# Patient Record
Sex: FEMALE | Race: BLACK OR AFRICAN AMERICAN | NOT HISPANIC OR LATINO | Employment: OTHER | URBAN - METROPOLITAN AREA
[De-identification: names, ages, dates, MRNs, and addresses within clinical notes are randomized per-mention and may not be internally consistent; named-entity substitution may affect disease eponyms.]

---

## 2018-09-15 ENCOUNTER — HOSPITAL ENCOUNTER (EMERGENCY)
Facility: HOSPITAL | Age: 53
Discharge: HOME | End: 2018-09-16
Attending: EMERGENCY MEDICINE
Payer: MEDICAID

## 2018-09-15 DIAGNOSIS — R60.9 EDEMA: ICD-10-CM

## 2018-09-15 DIAGNOSIS — I31.39 PERICARDIAL EFFUSION: ICD-10-CM

## 2018-09-15 DIAGNOSIS — R07.9 CHEST PAIN, UNSPECIFIED TYPE: ICD-10-CM

## 2018-09-15 DIAGNOSIS — I82.4Z2 LOWER LEG DVT (DEEP VENOUS THROMBOEMBOLISM), ACUTE, LEFT: Primary | ICD-10-CM

## 2018-09-15 DIAGNOSIS — R07.9 CHEST PAIN: ICD-10-CM

## 2018-09-15 LAB
BASOPHILS # BLD AUTO: 0.03 K/UL
BASOPHILS NFR BLD: 0.4 %
DIFFERENTIAL METHOD: ABNORMAL
EOSINOPHIL # BLD AUTO: 0.4 K/UL
EOSINOPHIL NFR BLD: 6.3 %
ERYTHROCYTE [DISTWIDTH] IN BLOOD BY AUTOMATED COUNT: 13.7 %
HCT VFR BLD AUTO: 38.5 %
HGB BLD-MCNC: 13 G/DL
LYMPHOCYTES # BLD AUTO: 1.8 K/UL
LYMPHOCYTES NFR BLD: 27.1 %
MCH RBC QN AUTO: 30 PG
MCHC RBC AUTO-ENTMCNC: 33.8 G/DL
MCV RBC AUTO: 89 FL
MONOCYTES # BLD AUTO: 0.6 K/UL
MONOCYTES NFR BLD: 9.1 %
NEUTROPHILS # BLD AUTO: 3.9 K/UL
NEUTROPHILS NFR BLD: 57.1 %
PLATELET # BLD AUTO: 377 K/UL
PMV BLD AUTO: 9.4 FL
RBC # BLD AUTO: 4.33 M/UL
WBC # BLD AUTO: 6.8 K/UL

## 2018-09-15 PROCEDURE — 83880 ASSAY OF NATRIURETIC PEPTIDE: CPT

## 2018-09-15 PROCEDURE — 80053 COMPREHEN METABOLIC PANEL: CPT

## 2018-09-15 PROCEDURE — 93005 ELECTROCARDIOGRAM TRACING: CPT

## 2018-09-15 PROCEDURE — 93010 ELECTROCARDIOGRAM REPORT: CPT | Mod: ,,, | Performed by: INTERNAL MEDICINE

## 2018-09-15 PROCEDURE — 25000003 PHARM REV CODE 250: Performed by: EMERGENCY MEDICINE

## 2018-09-15 PROCEDURE — 99285 EMERGENCY DEPT VISIT HI MDM: CPT | Mod: 25

## 2018-09-15 PROCEDURE — 84484 ASSAY OF TROPONIN QUANT: CPT

## 2018-09-15 PROCEDURE — 96374 THER/PROPH/DIAG INJ IV PUSH: CPT

## 2018-09-15 PROCEDURE — 85025 COMPLETE CBC W/AUTO DIFF WBC: CPT

## 2018-09-15 RX ORDER — LISINOPRIL 40 MG/1
40 TABLET ORAL DAILY
COMMUNITY
End: 2018-12-18 | Stop reason: SDUPTHER

## 2018-09-15 RX ORDER — HYDRALAZINE HYDROCHLORIDE 100 MG/1
100 TABLET, FILM COATED ORAL 3 TIMES DAILY
COMMUNITY
End: 2018-09-20 | Stop reason: SDUPTHER

## 2018-09-15 RX ORDER — ATORVASTATIN CALCIUM 80 MG/1
80 TABLET, FILM COATED ORAL DAILY
COMMUNITY

## 2018-09-15 RX ORDER — CEFUROXIME AXETIL 500 MG/1
500 TABLET ORAL
COMMUNITY
End: 2018-11-03

## 2018-09-15 RX ORDER — METOPROLOL SUCCINATE 25 MG/1
12.5 TABLET, EXTENDED RELEASE ORAL DAILY
COMMUNITY
End: 2018-09-20 | Stop reason: SDUPTHER

## 2018-09-15 RX ORDER — AMITRIPTYLINE HYDROCHLORIDE 50 MG/1
50 TABLET, FILM COATED ORAL NIGHTLY
COMMUNITY

## 2018-09-15 RX ORDER — GABAPENTIN 600 MG/1
600 TABLET ORAL 3 TIMES DAILY
COMMUNITY

## 2018-09-15 RX ORDER — ASPIRIN 81 MG/1
81 TABLET ORAL DAILY
COMMUNITY
End: 2020-07-08 | Stop reason: SDUPTHER

## 2018-09-15 RX ORDER — ASPIRIN 325 MG
325 TABLET ORAL
Status: COMPLETED | OUTPATIENT
Start: 2018-09-15 | End: 2018-09-15

## 2018-09-15 RX ORDER — CLOPIDOGREL BISULFATE 75 MG/1
75 TABLET ORAL DAILY
COMMUNITY
End: 2018-09-20 | Stop reason: ALTCHOICE

## 2018-09-15 RX ORDER — NIFEDIPINE 30 MG/1
30 TABLET, FILM COATED, EXTENDED RELEASE ORAL DAILY
COMMUNITY
End: 2018-11-12 | Stop reason: SDUPTHER

## 2018-09-15 RX ORDER — OXYBUTYNIN CHLORIDE 5 MG/1
5 TABLET ORAL 2 TIMES DAILY
COMMUNITY

## 2018-09-15 RX ADMIN — NITROGLYCERIN 1 INCH: 20 OINTMENT TOPICAL at 11:09

## 2018-09-15 RX ADMIN — ASPIRIN 325 MG ORAL TABLET 325 MG: 325 PILL ORAL at 11:09

## 2018-09-16 VITALS
HEART RATE: 88 BPM | TEMPERATURE: 98 F | RESPIRATION RATE: 18 BRPM | SYSTOLIC BLOOD PRESSURE: 136 MMHG | DIASTOLIC BLOOD PRESSURE: 73 MMHG | WEIGHT: 222.44 LBS | BODY MASS INDEX: 39.41 KG/M2 | OXYGEN SATURATION: 95 % | HEIGHT: 63 IN

## 2018-09-16 LAB
ALBUMIN SERPL BCP-MCNC: 2.8 G/DL
ALP SERPL-CCNC: 95 U/L
ALT SERPL W/O P-5'-P-CCNC: 91 U/L
ANION GAP SERPL CALC-SCNC: 13 MMOL/L
AST SERPL-CCNC: 121 U/L
BILIRUB SERPL-MCNC: 0.7 MG/DL
BNP SERPL-MCNC: 28 PG/ML
BUN SERPL-MCNC: 16 MG/DL
CALCIUM SERPL-MCNC: 9.1 MG/DL
CHLORIDE SERPL-SCNC: 108 MMOL/L
CO2 SERPL-SCNC: 16 MMOL/L
CREAT SERPL-MCNC: 1 MG/DL
EST. GFR  (AFRICAN AMERICAN): >60 ML/MIN/1.73 M^2
EST. GFR  (NON AFRICAN AMERICAN): >60 ML/MIN/1.73 M^2
GLUCOSE SERPL-MCNC: 112 MG/DL
POTASSIUM SERPL-SCNC: 4 MMOL/L
PROT SERPL-MCNC: 7.9 G/DL
SODIUM SERPL-SCNC: 137 MMOL/L
TROPONIN I SERPL DL<=0.01 NG/ML-MCNC: 0.01 NG/ML
TROPONIN I SERPL DL<=0.01 NG/ML-MCNC: 0.02 NG/ML

## 2018-09-16 PROCEDURE — 25500020 PHARM REV CODE 255: Performed by: EMERGENCY MEDICINE

## 2018-09-16 PROCEDURE — 84484 ASSAY OF TROPONIN QUANT: CPT

## 2018-09-16 PROCEDURE — 63600175 PHARM REV CODE 636 W HCPCS: Performed by: EMERGENCY MEDICINE

## 2018-09-16 RX ORDER — MORPHINE SULFATE 4 MG/ML
4 INJECTION, SOLUTION INTRAMUSCULAR; INTRAVENOUS
Status: COMPLETED | OUTPATIENT
Start: 2018-09-16 | End: 2018-09-16

## 2018-09-16 RX ADMIN — MORPHINE SULFATE 4 MG: 4 INJECTION INTRAVENOUS at 01:09

## 2018-09-16 RX ADMIN — IOHEXOL 100 ML: 350 INJECTION, SOLUTION INTRAVENOUS at 03:09

## 2018-09-16 NOTE — ED NOTES
Report received from Sagar BALTAZAR. Pt reports 0/10 CP. Awaiting CTA. Pt resting in bed. NAD noted. Pt at baseline. Will continue to monitor.

## 2018-09-16 NOTE — ED PROVIDER NOTES
"SCRIBE #1 NOTE: I, Neelam Rizo/Jocelyne Ortiz, am scribing for, and in the presence of, Keegan Castro Jr., MD. I have scribed the entire note.      History      Chief Complaint   Patient presents with    Chest Pain     midsternal CP "heaviness" since 1300 today     Review of patient's allergies indicates:  No Known Allergies     HPI   HPI    9/15/2018, 11:20 PM   History obtained from the patient      History of Present Illness: Tri Staton is a 53 y.o. female patient with PMHx of CVA, HTN, MI, CAD, hypercholesteremia, and neuropathy who presents to the Emergency Department for evaluation of gradually worsening LLE swelling first noted at 1:00 PM today as she was in the car traveling from [place]. No mitigating or exacerbating factors reported. Associated sxs include mild intermittent CP, mild SOB, trouble swallowing, sore throat, and cough. Pt denies fever, chills, N/V, diaphoresis, palpitations, extremity weakness/numbness, dizziness, and all other sxs at this time. Notably, pt had an MI while visiting in [place] last month and subsequently underwent cardiac stent placement-- may be in need of a CABG. Pt denies any previous blood clots. Pt notes she took ASA this morning.     Arrival mode: Personal vehicle    PCP: Primary Doctor No       Past Medical History:  Past Medical History:   Diagnosis Date    Coronary artery disease     Hypercholesteremia     Hypertension     Neuropathy     Stroke        Past Surgical History:  Past Surgical History:   Procedure Laterality Date     SECTION         Family History:  History reviewed. No pertinent family history.    Social History:  Social History     Tobacco Use    Smoking status: Former Smoker   Substance and Sexual Activity    Alcohol use: No     Frequency: Never    Drug use: No    Sexual activity: None reported.       ROS   Review of Systems   Constitutional: Negative for activity change, appetite change, chills, diaphoresis, fatigue and " fever.   HENT: Positive for sore throat and trouble swallowing. Negative for congestion, ear pain, nosebleeds, rhinorrhea and sinus pain.    Eyes: Negative for pain and discharge.   Respiratory: Positive for cough and shortness of breath. Negative for chest tightness, wheezing and stridor.    Cardiovascular: Positive for chest pain and leg swelling (L sided). Negative for palpitations.   Gastrointestinal: Negative for abdominal distention, abdominal pain, blood in stool, constipation, diarrhea, nausea and vomiting.   Genitourinary: Negative for difficulty urinating, dysuria, flank pain, frequency, hematuria and urgency.   Musculoskeletal: Negative for arthralgias, back pain, myalgias and neck pain.   Skin: Negative for pallor, rash and wound.   Neurological: Negative for dizziness, syncope, weakness, light-headedness, numbness and headaches.   Hematological: Does not bruise/bleed easily.   Psychiatric/Behavioral: Negative for confusion and self-injury.   All other systems reviewed and are negative.    Physical Exam      Initial Vitals [09/15/18 2305]   BP Pulse Resp Temp SpO2   (!) 148/79 110 20 98.5 °F (36.9 °C) 96 %      MAP       --          Physical Exam  Nursing Notes and Vital Signs Reviewed.  Constitutional: Patient is in no acute distress. Well-developed and well-nourished.  Head: Atraumatic. Normocephalic.  Eyes: PERRL. EOM intact. Conjunctivae are not pale. No scleral icterus.  ENT: Mucous membranes are moist. Oropharynx is clear and symmetric.    Neck: Supple. Full ROM. No lymphadenopathy.  Cardiovascular: Mildly swollen LLE. Regular rate. Regular rhythm. No murmurs, rubs, or gallops. Distal pulses are 2+ and symmetric.  Pulmonary/Chest: No respiratory distress. Clear to auscultation bilaterally. No wheezing or rales.  Abdominal: Soft and non-distended. There is no tenderness. No rebound, guarding, or rigidity. Good bowel sounds.  Genitourinary: No CVA tenderness  Musculoskeletal: Moves all extremities.  "No obvious deformities. Positive LLE edema.   Skin: Warm and dry.  Neurological:  Alert, awake, and appropriate.  Normal speech.  No acute focal neurological deficits are appreciated.  Psychiatric: Normal affect. Good eye contact. Appropriate in content.    ED Course    Procedures  ED Vital Signs:  Vitals:    09/15/18 2305 09/15/18 2316 09/15/18 2344 09/16/18 0132   BP: (!) 148/79   137/71   Pulse: 110  108 96   Resp: 20   (!) 22   Temp: 98.5 °F (36.9 °C)      TempSrc: Oral      SpO2: 96%   95%   Weight:  100.9 kg (222 lb 7 oz)     Height: 5' 3" (1.6 m)          Abnormal Lab Results:  Labs Reviewed   CBC W/ AUTO DIFFERENTIAL - Abnormal; Notable for the following components:       Result Value    Platelets 377 (*)     All other components within normal limits   COMPREHENSIVE METABOLIC PANEL - Abnormal; Notable for the following components:    CO2 16 (*)     Glucose 112 (*)     Albumin 2.8 (*)      (*)     ALT 91 (*)     All other components within normal limits   TROPONIN I   B-TYPE NATRIURETIC PEPTIDE   TROPONIN I        All Lab Results:  Results for orders placed or performed during the hospital encounter of 09/15/18   CBC auto differential   Result Value Ref Range    WBC 6.80 3.90 - 12.70 K/uL    RBC 4.33 4.00 - 5.40 M/uL    Hemoglobin 13.0 12.0 - 16.0 g/dL    Hematocrit 38.5 37.0 - 48.5 %    MCV 89 82 - 98 fL    MCH 30.0 27.0 - 31.0 pg    MCHC 33.8 32.0 - 36.0 g/dL    RDW 13.7 11.5 - 14.5 %    Platelets 377 (H) 150 - 350 K/uL    MPV 9.4 9.2 - 12.9 fL    Gran # (ANC) 3.9 1.8 - 7.7 K/uL    Lymph # 1.8 1.0 - 4.8 K/uL    Mono # 0.6 0.3 - 1.0 K/uL    Eos # 0.4 0.0 - 0.5 K/uL    Baso # 0.03 0.00 - 0.20 K/uL    Gran% 57.1 38.0 - 73.0 %    Lymph% 27.1 18.0 - 48.0 %    Mono% 9.1 4.0 - 15.0 %    Eosinophil% 6.3 0.0 - 8.0 %    Basophil% 0.4 0.0 - 1.9 %    Differential Method Automated    Comprehensive metabolic panel   Result Value Ref Range    Sodium 137 136 - 145 mmol/L    Potassium 4.0 3.5 - 5.1 mmol/L    Chloride " 108 95 - 110 mmol/L    CO2 16 (L) 23 - 29 mmol/L    Glucose 112 (H) 70 - 110 mg/dL    BUN, Bld 16 6 - 20 mg/dL    Creatinine 1.0 0.5 - 1.4 mg/dL    Calcium 9.1 8.7 - 10.5 mg/dL    Total Protein 7.9 6.0 - 8.4 g/dL    Albumin 2.8 (L) 3.5 - 5.2 g/dL    Total Bilirubin 0.7 0.1 - 1.0 mg/dL    Alkaline Phosphatase 95 55 - 135 U/L     (H) 10 - 40 U/L    ALT 91 (H) 10 - 44 U/L    Anion Gap 13 8 - 16 mmol/L    eGFR if African American >60 >60 mL/min/1.73 m^2    eGFR if non African American >60 >60 mL/min/1.73 m^2   Troponin I #1   Result Value Ref Range    Troponin I 0.020 0.000 - 0.026 ng/mL   B-Type natriuretic peptide (BNP)   Result Value Ref Range    BNP 28 0 - 99 pg/mL   Troponin I #2   Result Value Ref Range    Troponin I 0.012 0.000 - 0.026 ng/mL         Imaging Results:  Imaging Results          US Lower Extremity Veins Bilateral   DVT in the femoral and popliteal veins.             X-Ray Chest AP Portable   No acute findings.           CT PE Study:   Impression:   1. Mild cardiomegaly with thinning of the left ventricular apex. Pericardial effusion, measuring up to 1.6 cm thickness. Correlate clinically for pericarditis. Echocardiogram is recommended.  2. Atelectatic changes involving both lower lobes, right greater than left. Developing consolidation in the right lower lobe cannot be definitively excluded. Attention on follow-up after treatment is recommended.  3. Hepatomegaly with findings suggestive of cirrhosis.   4. Mildly prominent gallbladder is partially visualized. Right upper quadrant ultrasound may be obtained for further evaluation, if clinically indicated.  5. No definitve evidence of PE.                  The EKG was ordered, reviewed, and independently interpreted by the ED provider.  Interpretation time: 23:17  Rate: 104 BPM  Rhythm: sinus tachycardia  Interpretation: Possible LAE. RBBB. Septal infarct, age undetermined. No STEMI.         The Emergency Provider reviewed the vital signs and test  results, which are outlined above.    ED Discussion     12:48 AM: Reassessed pt at this time. Pt states that she is feeling much better. Additional order placed for morphine 4 mg injection. Discussed with pt all pertinent ED information and results. Pt verbalizes understanding of plan of care.     2:12 AM: Dr. Castro discussed the pt's case with Dr. Fink ([facility]) who recommends that we start the pt on Eliquis and repeat the troponin. If the troponin is stable, Dr. Fink recommends discharge to home with appropriate follow up. If repeat troponin is elevated, will admit.     2:14 AM: Reevaluation. Updated pt on all resulted studies, including LLE U/S showing DVT in the femoral and popliteal veins. Order placed for CT PE to r/o PE.     5:01 AM  The patient has a DVT in the left leg status post drive in from [place].  She has also had stent placement while in dialysis a month ago with questionable residual cardiovascular disease.  Patient has no primary care or cardiology here at this location currently.  Patient has a troponin that is trending downward.  She has a DVT on ultrasound the left leg as well as 1.6 cm pericardial effusion.  I have discussed the case at length with  ([facility]) on 2 different occasions.  He is aware of all findings on the patient.  He recommends outpatient treatment with Eliquis and close follow-up with Cardiology.  I discussed this with the patient the family.  They verbalized understanding of all instructions.  I have provided them cardiology is numbers well as information for [facility] to establish primary care.  They seem reliable.        ED Medication(s):  Medications   apixaban (ELIQUIS) 2.5 mg tablet (not administered)   apixaban (ELIQUIS) 2.5 mg tablet (not administered)   aspirin tablet 325 mg (325 mg Oral Given 9/15/18 2348)   nitroGLYCERIN 2% TD oint ointment 1 inch (1 inch Topical (Top) Given 9/15/18 2348)   morphine injection 4  mg (4 mg Intravenous Given 9/16/18 0114)        Current Discharge Medication List                Medical Decision Making    Medical Decision Making:   Clinical Tests:   Lab Tests: Reviewed and Ordered  Radiological Study: Reviewed and Ordered  Medical Tests: Reviewed and Ordered           Scribe Attestation:   Scribe #1: I performed the above scribed service and the documentation accurately describes the services I performed. I attest to the accuracy of the note.    Attending:   Physician Attestation Statement for Scribe #1: I, Keegan Castro Jr., MD, personally performed the services described in this documentation, as scribed by Neelam Rizo/Jocelyne Ortiz, in my presence, and it is both accurate and complete.        Clinical Impression       ICD-10-CM ICD-9-CM   1. Chest pain R07.9 786.50   2. Edema R60.9 782.3       Disposition:   Disposition: Discharged  Condition: Stable         Keegan Castro Jr., MD  09/16/18 0503

## 2018-09-16 NOTE — ED NOTES
MD Castro at bedside discussing discharge with pt and family. CP denied at this time. Pt resting in bed, NAD noted. RR e/u, airway open and patent. VSS. Will continue with discharge.

## 2018-09-16 NOTE — DISCHARGE INSTRUCTIONS
New have a deep venous thrombosis of the left leg.  Nose have a pericardial effusion.  These conditions require close follow-up.  I Have provided to used cardiologist information.  Please call him Monday to schedule an appointment.  Please walk into the [facility] on Monday as well for re-evaluation and management of your left leg DVT.  Take Eliquis as prescribed.  Continue all other home medications.  Return as needed for any worsening symptoms, problems, questions or concerns.

## 2018-09-20 ENCOUNTER — OFFICE VISIT (OUTPATIENT)
Dept: CARDIOLOGY | Facility: CLINIC | Age: 53
End: 2018-09-20
Payer: MEDICAID

## 2018-09-20 VITALS
SYSTOLIC BLOOD PRESSURE: 128 MMHG | BODY MASS INDEX: 37.39 KG/M2 | DIASTOLIC BLOOD PRESSURE: 70 MMHG | HEIGHT: 63 IN | HEART RATE: 106 BPM | WEIGHT: 211 LBS

## 2018-09-20 DIAGNOSIS — I63.9 CEREBROVASCULAR ACCIDENT (CVA), UNSPECIFIED MECHANISM: ICD-10-CM

## 2018-09-20 DIAGNOSIS — O22.30 DVT (DEEP VEIN THROMBOSIS) IN PREGNANCY: ICD-10-CM

## 2018-09-20 DIAGNOSIS — I25.118 CORONARY ARTERY DISEASE OF NATIVE ARTERY OF NATIVE HEART WITH STABLE ANGINA PECTORIS: ICD-10-CM

## 2018-09-20 DIAGNOSIS — Z87.891 EX-CIGAR SMOKER: ICD-10-CM

## 2018-09-20 DIAGNOSIS — E11.9 CONTROLLED TYPE 2 DIABETES MELLITUS WITHOUT COMPLICATION, WITHOUT LONG-TERM CURRENT USE OF INSULIN: ICD-10-CM

## 2018-09-20 DIAGNOSIS — I31.39 PERICARDIAL EFFUSION: ICD-10-CM

## 2018-09-20 DIAGNOSIS — I10 ESSENTIAL HYPERTENSION: ICD-10-CM

## 2018-09-20 DIAGNOSIS — E78.2 MIXED HYPERLIPIDEMIA: ICD-10-CM

## 2018-09-20 DIAGNOSIS — F10.11 HISTORY OF ALCOHOL ABUSE: ICD-10-CM

## 2018-09-20 DIAGNOSIS — F14.11 HISTORY OF COCAINE ABUSE: ICD-10-CM

## 2018-09-20 PROCEDURE — 99999 PR PBB SHADOW E&M-EST. PATIENT-LVL III: CPT | Mod: PBBFAC,,, | Performed by: INTERNAL MEDICINE

## 2018-09-20 PROCEDURE — 99205 OFFICE O/P NEW HI 60 MIN: CPT | Mod: S$PBB,,, | Performed by: INTERNAL MEDICINE

## 2018-09-20 PROCEDURE — 99213 OFFICE O/P EST LOW 20 MIN: CPT | Mod: PBBFAC,PO | Performed by: INTERNAL MEDICINE

## 2018-09-20 RX ORDER — ISOSORBIDE MONONITRATE 60 MG/1
60 TABLET, EXTENDED RELEASE ORAL DAILY
Qty: 30 TABLET | Refills: 3 | Status: SHIPPED | OUTPATIENT
Start: 2018-09-20 | End: 2018-10-09 | Stop reason: SDUPTHER

## 2018-09-20 RX ORDER — HYDRALAZINE HYDROCHLORIDE 100 MG/1
100 TABLET, FILM COATED ORAL 2 TIMES DAILY
Start: 2018-09-20 | End: 2018-10-09 | Stop reason: DRUGHIGH

## 2018-09-20 RX ORDER — METOPROLOL SUCCINATE 25 MG/1
25 TABLET, EXTENDED RELEASE ORAL DAILY
Qty: 30 TABLET | Refills: 5 | Status: SHIPPED | OUTPATIENT
Start: 2018-09-20 | End: 2019-03-21 | Stop reason: SDUPTHER

## 2018-09-20 NOTE — PROGRESS NOTES
Subjective:   Patient ID:  Tri Staton is a 53 y.o. female who presents for evaluation of Hospital Follow Up (stroke and heart attack); Fatigue; Shortness of Breath; and Edema      54 yo female, referred for CAD, wheelchair bound.  PMH recent Dx of CAD and CVA with residual left side weakness, HTN, MI, hypercholesteremia, and neuropathy.  Chest pain, dyspnea and could not move. Went to [facility]. Dx of CVA. D/c'ed. Few days later, felt worse and fever. Went to hospital again. Had cath and CABG was advised. Pt decided to go back to  for medical care.   Went to ER [facility] on  for LLE swelling and pain.  US revealed positive DVT and started eliquis. Chest CTA moderate pericardial effusion and no PE. D/c'ed from ER.    Today, C/o chest pain once a day. Mother found blood on the tissue at bathroom.  Feels fatigue.  Left side weakness and leg swelling.  No dyspnea, palpitation and dizziness.     Quit smoking 3 months, 1 cigar a day.x 10 yrs  Quit drinking 6 months, 1 pint of liquor a day x10 yrs.  Smoked marijuana  Smoked cocaines one yr ago for 10 yrs, remote IV cocaine 25 yrs ago.              Past Medical History:   Diagnosis Date    Coronary artery disease     Hypercholesteremia     Hypertension     Neuropathy     Stroke        Past Surgical History:   Procedure Laterality Date     SECTION         Social History     Tobacco Use    Smoking status: Former Smoker   Substance Use Topics    Alcohol use: No     Frequency: Never    Drug use: No       No family history on file.    Review of Systems   Constitution: Negative for decreased appetite, diaphoresis, fever, weakness, malaise/fatigue and night sweats.   HENT: Negative for nosebleeds.    Eyes: Negative for blurred vision and double vision.   Cardiovascular: Positive for leg swelling. Negative for chest pain, claudication, dyspnea on exertion, irregular heartbeat, near-syncope, orthopnea, palpitations, paroxysmal nocturnal  dyspnea and syncope.   Respiratory: Negative for cough, shortness of breath, sleep disturbances due to breathing, snoring, sputum production and wheezing.    Endocrine: Negative for cold intolerance and polyuria.   Hematologic/Lymphatic: Does not bruise/bleed easily.   Skin: Negative for rash.   Musculoskeletal: Positive for muscle weakness. Negative for back pain, falls, joint pain, joint swelling and neck pain.   Gastrointestinal: Negative for abdominal pain, heartburn, nausea and vomiting.   Genitourinary: Negative for dysuria, frequency and hematuria.   Neurological: Positive for loss of balance. Negative for difficulty with concentration, dizziness, focal weakness, headaches, light-headedness, numbness and seizures.   Psychiatric/Behavioral: Negative for depression, memory loss and substance abuse. The patient does not have insomnia.    Allergic/Immunologic: Negative for HIV exposure and hives.       Objective:   Physical Exam   Constitutional: She is oriented to person, place, and time. She appears well-nourished.   HENT:   Head: Normocephalic.   Eyes: Pupils are equal, round, and reactive to light.   Neck: Normal carotid pulses and no JVD present. Carotid bruit is not present. No thyromegaly present.   Cardiovascular: Normal rate, regular rhythm, normal heart sounds and normal pulses.  No extrasystoles are present. PMI is not displaced. Exam reveals no gallop and no S3.   No murmur heard.  Pulmonary/Chest: Breath sounds normal. No stridor. No respiratory distress.   Abdominal: Soft. Bowel sounds are normal. There is no tenderness. There is no rebound.   Musculoskeletal: Normal range of motion. She exhibits edema.   Trace edema   Neurological: She is alert and oriented to person, place, and time.   Skin: Skin is intact. No rash noted.   Psychiatric: Her behavior is normal.       No results found for: CHOL  No results found for: HDL  No results found for: LDLCALC  No results found for: TRIG  No results found  for: CHOLHDL    Chemistry        Component Value Date/Time     09/15/2018 2335    K 4.0 09/15/2018 2335     09/15/2018 2335    CO2 16 (L) 09/15/2018 2335    BUN 16 09/15/2018 2335    CREATININE 1.0 09/15/2018 2335     (H) 09/15/2018 2335        Component Value Date/Time    CALCIUM 9.1 09/15/2018 2335    ALKPHOS 95 09/15/2018 2335     (H) 09/15/2018 2335    ALT 91 (H) 09/15/2018 2335    BILITOT 0.7 09/15/2018 2335    ESTGFRAFRICA >60 09/15/2018 2335    EGFRNONAA >60 09/15/2018 2335          No results found for: LABA1C, HGBA1C  No results found for: TSH  No results found for: INR, PROTIME  Lab Results   Component Value Date    WBC 6.80 09/15/2018    HGB 13.0 09/15/2018    HCT 38.5 09/15/2018    MCV 89 09/15/2018     (H) 09/15/2018     BNP  @LABRCNTIP(BNP,BNPTRIAGEBLO)@  Estimated Creatinine Clearance: 69.9 mL/min (based on SCr of 1 mg/dL).  No results found in the last 24 hours.  No results found in the last 24 hours.  No results found in the last 24 hours.    Assessment:      1. Coronary artery disease of native artery of native heart with stable angina pectoris    2. Pericardial effusion    3. Cerebrovascular accident (CVA), unspecified mechanism    4. Essential hypertension    5. Controlled type 2 diabetes mellitus without complication, without long-term current use of insulin    6. Mixed hyperlipidemia    7. Ex-cigar smoker    8. History of alcohol abuse    9. History of cocaine abuse    10. DVT (deep vein thrombosis) in pregnancy      CP stable  No CHF  DVT stable  EKG sinus tachy  Plan:   Obtain old record and disc of LHC from [facility]  D/c Plavix  Continue ASA and Eliquis 5 mg bid  Add Imdur 60 mg daily  Decrease Hydralazine to 100 mg bid  Increase Metoprolol to 25 mg daily  Continue Nifedipine, Lisinopril and Lipitor  Echo ordered  Advise to check BP at home,  Will call CVT surgeon after C disc available.    Obtained Cath and MPI report on 10/03 from [facility]  yadiel at [facility].  Cath done by Dr. An: severe multipvessel Dz. Patent LIMA.  MPI showed EF 42% and anterior ischemia  Called CVT.

## 2018-09-26 ENCOUNTER — DOCUMENTATION ONLY (OUTPATIENT)
Dept: CARDIOLOGY | Facility: CLINIC | Age: 53
End: 2018-09-26

## 2018-09-26 ENCOUNTER — CLINICAL SUPPORT (OUTPATIENT)
Dept: CARDIOLOGY | Facility: CLINIC | Age: 53
End: 2018-09-26
Attending: INTERNAL MEDICINE
Payer: MEDICAID

## 2018-09-26 DIAGNOSIS — I31.39 PERICARDIAL EFFUSION: ICD-10-CM

## 2018-09-26 DIAGNOSIS — I25.118 CORONARY ARTERY DISEASE OF NATIVE ARTERY OF NATIVE HEART WITH STABLE ANGINA PECTORIS: ICD-10-CM

## 2018-09-26 LAB
DIASTOLIC DYSFUNCTION: NO
GLOBAL PERICARDIAL EFFUSION: ABNORMAL
MITRAL VALVE MOBILITY: NORMAL
RETIRED EF AND QEF - SEE NOTES: 60 (ref 55–65)

## 2018-09-26 PROCEDURE — 93325 DOPPLER ECHO COLOR FLOW MAPG: CPT | Mod: 26,S$PBB,, | Performed by: INTERNAL MEDICINE

## 2018-09-26 PROCEDURE — 93320 DOPPLER ECHO COMPLETE: CPT | Mod: 26,S$PBB,, | Performed by: INTERNAL MEDICINE

## 2018-09-26 PROCEDURE — 93303 ECHO TRANSTHORACIC: CPT | Mod: 26,S$PBB,, | Performed by: INTERNAL MEDICINE

## 2018-09-26 NOTE — PROGRESS NOTES
Pt presented for an echocardiogram today.  This study was performed in conjunction with Optison contrast agent because of poor endocardial visualization.  Procedure was explained to the patient, she verbalized understanding and signed the consent.  IV, 24ga x 2 attempts, was started in the right wrist using aseptic technique.  Administered a total of 4 ml of Optison (lot # [id number], expiration date 12/5/2019).  Patient tolerated the procedure well.  IV discontinued, pressure dressing applied.

## 2018-09-27 ENCOUNTER — HOSPITAL ENCOUNTER (EMERGENCY)
Facility: HOSPITAL | Age: 53
Discharge: HOME | End: 2018-09-27
Attending: EMERGENCY MEDICINE
Payer: MEDICAID

## 2018-09-27 VITALS
TEMPERATURE: 98 F | HEIGHT: 63 IN | RESPIRATION RATE: 20 BRPM | BODY MASS INDEX: 37.38 KG/M2 | SYSTOLIC BLOOD PRESSURE: 129 MMHG | DIASTOLIC BLOOD PRESSURE: 84 MMHG | HEART RATE: 104 BPM | OXYGEN SATURATION: 98 %

## 2018-09-27 DIAGNOSIS — R26.9 ALTERED MOBILITY DUE TO OLD STROKE: ICD-10-CM

## 2018-09-27 DIAGNOSIS — I69.398 ALTERED MOBILITY DUE TO OLD STROKE: ICD-10-CM

## 2018-09-27 DIAGNOSIS — M79.2 NEUROPATHIC PAIN: ICD-10-CM

## 2018-09-27 DIAGNOSIS — K59.00 CONSTIPATION: Primary | ICD-10-CM

## 2018-09-27 DIAGNOSIS — R53.1 WEAKNESS: ICD-10-CM

## 2018-09-27 DIAGNOSIS — Z79.01 CHRONIC ANTICOAGULATION: ICD-10-CM

## 2018-09-27 LAB
ALBUMIN SERPL BCP-MCNC: 2.9 G/DL
ALP SERPL-CCNC: 70 U/L
ALT SERPL W/O P-5'-P-CCNC: 48 U/L
ANION GAP SERPL CALC-SCNC: 14 MMOL/L
APTT BLDCRRT: 24.5 SEC
AST SERPL-CCNC: 72 U/L
BASOPHILS # BLD AUTO: 0.04 K/UL
BASOPHILS NFR BLD: 0.6 %
BILIRUB SERPL-MCNC: 0.6 MG/DL
BUN SERPL-MCNC: 14 MG/DL
CALCIUM SERPL-MCNC: 9 MG/DL
CHLORIDE SERPL-SCNC: 106 MMOL/L
CO2 SERPL-SCNC: 19 MMOL/L
CREAT SERPL-MCNC: 1.2 MG/DL
DIFFERENTIAL METHOD: NORMAL
EOSINOPHIL # BLD AUTO: 0.2 K/UL
EOSINOPHIL NFR BLD: 2.6 %
ERYTHROCYTE [DISTWIDTH] IN BLOOD BY AUTOMATED COUNT: 14.3 %
EST. GFR  (AFRICAN AMERICAN): 59 ML/MIN/1.73 M^2
EST. GFR  (NON AFRICAN AMERICAN): 51 ML/MIN/1.73 M^2
GLUCOSE SERPL-MCNC: 109 MG/DL
HCT VFR BLD AUTO: 39.2 %
HGB BLD-MCNC: 13 G/DL
INR PPP: 1.2
LYMPHOCYTES # BLD AUTO: 1.6 K/UL
LYMPHOCYTES NFR BLD: 23.1 %
MCH RBC QN AUTO: 30.4 PG
MCHC RBC AUTO-ENTMCNC: 33.2 G/DL
MCV RBC AUTO: 92 FL
MONOCYTES # BLD AUTO: 0.5 K/UL
MONOCYTES NFR BLD: 7.7 %
NEUTROPHILS # BLD AUTO: 4.7 K/UL
NEUTROPHILS NFR BLD: 66 %
PLATELET # BLD AUTO: 285 K/UL
PMV BLD AUTO: 9.7 FL
POTASSIUM SERPL-SCNC: 4.5 MMOL/L
PROT SERPL-MCNC: 7.6 G/DL
PROTHROMBIN TIME: 12.7 SEC
RBC # BLD AUTO: 4.28 M/UL
SODIUM SERPL-SCNC: 139 MMOL/L
TROPONIN I SERPL DL<=0.01 NG/ML-MCNC: 0.01 NG/ML
WBC # BLD AUTO: 7.05 K/UL

## 2018-09-27 PROCEDURE — 25000003 PHARM REV CODE 250: Performed by: EMERGENCY MEDICINE

## 2018-09-27 PROCEDURE — 84484 ASSAY OF TROPONIN QUANT: CPT

## 2018-09-27 PROCEDURE — 96361 HYDRATE IV INFUSION ADD-ON: CPT

## 2018-09-27 PROCEDURE — 93010 ELECTROCARDIOGRAM REPORT: CPT | Mod: ,,, | Performed by: INTERNAL MEDICINE

## 2018-09-27 PROCEDURE — 85730 THROMBOPLASTIN TIME PARTIAL: CPT

## 2018-09-27 PROCEDURE — 93005 ELECTROCARDIOGRAM TRACING: CPT

## 2018-09-27 PROCEDURE — 96360 HYDRATION IV INFUSION INIT: CPT

## 2018-09-27 PROCEDURE — 85025 COMPLETE CBC W/AUTO DIFF WBC: CPT

## 2018-09-27 PROCEDURE — 85610 PROTHROMBIN TIME: CPT

## 2018-09-27 PROCEDURE — 80053 COMPREHEN METABOLIC PANEL: CPT

## 2018-09-27 PROCEDURE — 99284 EMERGENCY DEPT VISIT MOD MDM: CPT | Mod: 25

## 2018-09-27 RX ORDER — ACETAMINOPHEN 325 MG/1
650 TABLET ORAL
Status: COMPLETED | OUTPATIENT
Start: 2018-09-27 | End: 2018-09-27

## 2018-09-27 RX ADMIN — SODIUM CHLORIDE 500 ML: 0.9 INJECTION, SOLUTION INTRAVENOUS at 02:09

## 2018-09-27 RX ADMIN — ACETAMINOPHEN 650 MG: 325 TABLET ORAL at 03:09

## 2018-09-27 NOTE — ED PROVIDER NOTES
SCRIBE #1 NOTE: I, Guido Landon, am scribing for, and in the presence of, Faith Blackwell MD. I have scribed the entire note.      History      Chief Complaint   Patient presents with    Rectal Bleeding     SOB, possible hemorrhoids, general weakness, neck pain, caregiver reports rectal bleeding x1 month- she thought it was the patients period       Review of patient's allergies indicates:  No Known Allergies     HPI   HPI    2018, 2:19 PM   History obtained from the mother and patient      History of Present Illness: Tri Staton is a 53 y.o. female patient with a PMHx of CAD, HTN, DVT, neuropathy, and chronic left sided weakness secondary to past stroke who presents to the Emergency Department for an evaluation of rectal bleeding which onset suddenly a few weeks ago. Pt's mother reports blood on the tissue when cleaning pt. She also reports a possible hemorrhoid. Pt reports she is on eliquis. Symptoms are intermittent and moderate in severity. Exacerbated by nothing and relieved by nothing. Associated sxs include lightheadedness and fatigue. Patient denies any fever, chills, CP, SOB, N/V, weakness/numbness, and all other sxs at this time. Pt denies tx PTA. No further complaints or concerns at this time.       Arrival mode: Personal vehicle      PCP: Primary Doctor No       Past Medical History:  Past Medical History:   Diagnosis Date    Coronary artery disease     Hypercholesteremia     Hypertension     Neuropathy     Stroke        Past Surgical History:  Past Surgical History:   Procedure Laterality Date     SECTION           Family History:  History reviewed. No pertinent family history.    Social History:  Social History     Tobacco Use    Smoking status: Former Smoker   Substance and Sexual Activity    Alcohol use: No     Frequency: Never    Drug use: No    Sexual activity: Unknown       ROS   Review of Systems   Constitutional: Positive for fatigue. Negative for appetite  change, chills and fever.   HENT: Negative for congestion and sore throat.    Eyes: Negative for photophobia and visual disturbance.   Respiratory: Negative for cough and shortness of breath.    Cardiovascular: Negative for chest pain.   Gastrointestinal: Positive for anal bleeding and blood in stool. Negative for abdominal distention, constipation, diarrhea, nausea and vomiting.        (-) Hematemesis   Genitourinary: Negative for dysuria, frequency, hematuria, urgency, vaginal bleeding and vaginal discharge.   Musculoskeletal: Negative for back pain.   Skin: Negative for rash.   Neurological: Positive for light-headedness. Negative for dizziness, syncope, weakness, numbness and headaches.   Hematological: Does not bruise/bleed easily.     Physical Exam      Initial Vitals [09/27/18 1357]   BP Pulse Resp Temp SpO2   (!) 101/58 102 20 97.7 °F (36.5 °C) 95 %      MAP       --          Physical Exam  Nursing Notes and Vital Signs Reviewed.  Constitutional: Patient is in no acute distress. Obese.  Head: Atraumatic. Normocephalic.  Eyes: PERRL. EOM intact. Conjunctivae are not pale. No scleral icterus.  ENT: Mucous membranes are moist. Oropharynx is clear and symmetric.    Neck: Supple. Full ROM. No lymphadenopathy.  Cardiovascular: Regular rate. Regular rhythm.  Pulmonary/Chest: No respiratory distress. Clear to auscultation bilaterally. No wheezing or rales.  Abdominal: Soft and non-distended.  There is no tenderness.  Rectal:  No tenderness.  No masses.  No hemorrhoids.  Normal sphincter tone. Small anal tear noted with no active bleeding.  Musculoskeletal: Moves all extremities. No obvious deformities. No edema. No calf tenderness.  Skin: Warm and dry.  Neurological:  Alert, awake, and appropriate.  Chronic left sided weakness.  Psychiatric: Normal affect. Good eye contact. Appropriate in content.    ED Course    Procedures  ED Vital Signs:  Vitals:    09/27/18 1357 09/27/18 1415 09/27/18 1418 09/27/18 1500   BP:  "(!) 101/58 123/72  123/78   Pulse: 102 102 103 102   Resp: 20 20  20   Temp: 97.7 °F (36.5 °C)      TempSrc: Oral      SpO2: 95% 99%  98%   Height: 5' 3" (1.6 m)       09/27/18 1557 09/27/18 1600   BP: 139/80 129/84   Pulse: 104 104   Resp: 20 20   Temp:     TempSrc:     SpO2: 97% 98%   Height:         Abnormal Lab Results:  Labs Reviewed   PROTIME-INR - Abnormal; Notable for the following components:       Result Value    Prothrombin Time 12.7 (*)     All other components within normal limits   COMPREHENSIVE METABOLIC PANEL - Abnormal; Notable for the following components:    CO2 19 (*)     Albumin 2.9 (*)     AST 72 (*)     ALT 48 (*)     eGFR if  59 (*)     eGFR if non  51 (*)     All other components within normal limits   CBC W/ AUTO DIFFERENTIAL   APTT   TROPONIN I        All Lab Results:  Results for orders placed or performed during the hospital encounter of 09/27/18   CBC auto differential   Result Value Ref Range    WBC 7.05 3.90 - 12.70 K/uL    RBC 4.28 4.00 - 5.40 M/uL    Hemoglobin 13.0 12.0 - 16.0 g/dL    Hematocrit 39.2 37.0 - 48.5 %    MCV 92 82 - 98 fL    MCH 30.4 27.0 - 31.0 pg    MCHC 33.2 32.0 - 36.0 g/dL    RDW 14.3 11.5 - 14.5 %    Platelets 285 150 - 350 K/uL    MPV 9.7 9.2 - 12.9 fL    Gran # (ANC) 4.7 1.8 - 7.7 K/uL    Lymph # 1.6 1.0 - 4.8 K/uL    Mono # 0.5 0.3 - 1.0 K/uL    Eos # 0.2 0.0 - 0.5 K/uL    Baso # 0.04 0.00 - 0.20 K/uL    Gran% 66.0 38.0 - 73.0 %    Lymph% 23.1 18.0 - 48.0 %    Mono% 7.7 4.0 - 15.0 %    Eosinophil% 2.6 0.0 - 8.0 %    Basophil% 0.6 0.0 - 1.9 %    Differential Method Automated    Protime-INR   Result Value Ref Range    Prothrombin Time 12.7 (H) 9.0 - 12.5 sec    INR 1.2 0.8 - 1.2   APTT   Result Value Ref Range    aPTT 24.5 21.0 - 32.0 sec   Comprehensive metabolic panel   Result Value Ref Range    Sodium 139 136 - 145 mmol/L    Potassium 4.5 3.5 - 5.1 mmol/L    Chloride 106 95 - 110 mmol/L    CO2 19 (L) 23 - 29 mmol/L    Glucose " 109 70 - 110 mg/dL    BUN, Bld 14 6 - 20 mg/dL    Creatinine 1.2 0.5 - 1.4 mg/dL    Calcium 9.0 8.7 - 10.5 mg/dL    Total Protein 7.6 6.0 - 8.4 g/dL    Albumin 2.9 (L) 3.5 - 5.2 g/dL    Total Bilirubin 0.6 0.1 - 1.0 mg/dL    Alkaline Phosphatase 70 55 - 135 U/L    AST 72 (H) 10 - 40 U/L    ALT 48 (H) 10 - 44 U/L    Anion Gap 14 8 - 16 mmol/L    eGFR if African American 59 (A) >60 mL/min/1.73 m^2    eGFR if non African American 51 (A) >60 mL/min/1.73 m^2   Troponin I   Result Value Ref Range    Troponin I 0.009 0.000 - 0.026 ng/mL         Imaging Results:  Imaging Results          X-Ray Abdomen Flat And Erect (Final result)  Result time 09/27/18 15:02:05    Final result by Sagar Benjamin III, MD (09/27/18 15:02:05)                 Impression:      Nonspecific gas pattern.  Large volume of stool in the colon.  No free air.      Electronically signed by: Sagar Benjamin MD  Date:    09/27/2018  Time:    15:02             Narrative:    EXAMINATION:  XR ABDOMEN FLAT AND ERECT    CLINICAL HISTORY:  Constipation, unspecified    COMPARISON:  None    FINDINGS:  Nonspecific gas pattern without mass or obstruction.  Moderately large volume of stool noted in the colon.  No free air.                               X-Ray Chest AP Portable (Final result)  Result time 09/27/18 15:01:16    Final result by Sagar Benjamin III, MD (09/27/18 15:01:16)                 Impression:      Negative one view chest x-ray.      Electronically signed by: Sagar Benjamin MD  Date:    09/27/2018  Time:    15:01             Narrative:    EXAMINATION:  XR CHEST AP PORTABLE    CLINICAL HISTORY:  Chest Pain;    COMPARISON:  September 16th    FINDINGS:  Heart size is borderline with mild tortuosity of the thoracic aorta.  Lung fields are clear.  Clearing of the atelectatic changes noted previously.                                      The EKG was ordered, reviewed, and independently interpreted by the ED provider.  Interpretation time:  14:23  Rate: 113 BPM  Rhythm: sinus tachycardia with occasional premature ventricular complexes.  Interpretation: Possible left atrial enlargement Right bundle branch block. Anteroseptal infarct. No STEMI.        The Emergency Provider reviewed the vital signs and test results, which are outlined above.    ED Discussion     3:50 PM: Reassessed pt at this time, h/h stable no active rectal bleeding, discussed tx for constipation.  Pt states her condition has improved at this time. Discussed with pt all pertinent ED information and results. Discussed pt dx and plan of tx with Miralax and milk of magnesia. Gave pt all f/u and return to the ED instructions. All questions and concerns were addressed at this time. Pt expresses understanding of information and instructions, and is comfortable with plan to discharge. Pt is stable for discharge.    I discussed with patient and/or family/caretaker that evaluation in the ED does not suggest any emergent or life threatening medical conditions requiring immediate intervention beyond what was provided in the ED, and I believe patient is safe for discharge.  Regardless, an unremarkable evaluation in the ED does not preclude the development or presence of a serious of life threatening condition. As such, patient was instructed to return immediately for any worsening or change in current symptoms.    ED Medication(s):  Medications   sodium chloride 0.9% bolus 500 mL (0 mLs Intravenous Stopped 9/27/18 1557)   acetaminophen tablet 650 mg (650 mg Oral Given 9/27/18 1553)       Follow-up Information     Local Primary Care Doctor. Schedule an appointment as soon as possible for a visit in 1 day.    Why:  Return to the Emergency Room, If symptoms worsen                   Medical Decision Making    Medical Decision Making:   Clinical Tests:   Lab Tests: Reviewed and Ordered  Radiological Study: Reviewed and Ordered  Medical Tests: Ordered and Reviewed           Scribe Attestation:   Scribe  #1: I performed the above scribed service and the documentation accurately describes the services I performed. I attest to the accuracy of the note.    Attending:   Physician Attestation Statement for Scribe #1: I, Faith Blackwell MD, personally performed the services described in this documentation, as scribed by Guido Navarrete, in my presence, and it is both accurate and complete.          Clinical Impression       ICD-10-CM ICD-9-CM   1. Constipation K59.00 564.00   2. Weakness R53.1 780.79   3. Altered mobility due to old stroke I69.398 438.89    R26.9 781.99   4. Chronic anticoagulation Z79.01 V58.61   5. Neuropathic pain M79.2 729.2       Disposition:   Disposition: Discharged  Condition: Stable         Faith Blackwell MD  09/27/18 3004

## 2018-10-02 ENCOUNTER — TELEPHONE (OUTPATIENT)
Dept: CARDIOLOGY | Facility: CLINIC | Age: 53
End: 2018-10-02

## 2018-10-02 NOTE — TELEPHONE ENCOUNTER
"Spoke with pt with who stated she wanted to know what is going to be done to "resolve the situation."  Pt states that her echo showed pericardial effusion and it is not resolved.  Will ask dr. Weldon to advise.     ----- Message from Monroe Saldana sent at 10/2/2018  1:54 PM CDT -----  Contact: pt mother   Caller is requesting a call back from the nurse in regards to pt getting her test results please.  [phone] (home)  Or [phone] cell       "

## 2018-10-02 NOTE — TELEPHONE ENCOUNTER
Spoke with pt.  Sent fax request to [facility]  for disc, discharge summary, relevant labs and cardiac tests for the month of September 2018

## 2018-10-04 ENCOUNTER — HOSPITAL ENCOUNTER (EMERGENCY)
Facility: HOSPITAL | Age: 53
Discharge: HOME | End: 2018-10-04
Attending: EMERGENCY MEDICINE
Payer: MEDICAID

## 2018-10-04 ENCOUNTER — TELEPHONE (OUTPATIENT)
Dept: CARDIOLOGY | Facility: CLINIC | Age: 53
End: 2018-10-04

## 2018-10-04 VITALS
OXYGEN SATURATION: 97 % | RESPIRATION RATE: 19 BRPM | DIASTOLIC BLOOD PRESSURE: 70 MMHG | SYSTOLIC BLOOD PRESSURE: 127 MMHG | WEIGHT: 228.31 LBS | HEART RATE: 96 BPM | BODY MASS INDEX: 40.45 KG/M2 | HEIGHT: 63 IN | TEMPERATURE: 98 F

## 2018-10-04 DIAGNOSIS — R07.9 CHEST PAIN: ICD-10-CM

## 2018-10-04 DIAGNOSIS — K64.4 EXTERNAL HEMORRHOID: Primary | ICD-10-CM

## 2018-10-04 LAB
ALBUMIN SERPL BCP-MCNC: 3.2 G/DL
ALP SERPL-CCNC: 73 U/L
ALT SERPL W/O P-5'-P-CCNC: 84 U/L
ANION GAP SERPL CALC-SCNC: 8 MMOL/L
APTT BLDCRRT: 23.7 SEC
AST SERPL-CCNC: 131 U/L
BASOPHILS # BLD AUTO: 0.02 K/UL
BASOPHILS NFR BLD: 0.4 %
BILIRUB SERPL-MCNC: 0.5 MG/DL
BNP SERPL-MCNC: 15 PG/ML
BUN SERPL-MCNC: 16 MG/DL
CALCIUM SERPL-MCNC: 9.5 MG/DL
CHLORIDE SERPL-SCNC: 109 MMOL/L
CO2 SERPL-SCNC: 22 MMOL/L
CREAT SERPL-MCNC: 1 MG/DL
DIFFERENTIAL METHOD: NORMAL
EOSINOPHIL # BLD AUTO: 0.3 K/UL
EOSINOPHIL NFR BLD: 6.5 %
ERYTHROCYTE [DISTWIDTH] IN BLOOD BY AUTOMATED COUNT: 14.3 %
EST. GFR  (AFRICAN AMERICAN): >60 ML/MIN/1.73 M^2
EST. GFR  (NON AFRICAN AMERICAN): >60 ML/MIN/1.73 M^2
GLUCOSE SERPL-MCNC: 94 MG/DL
HCT VFR BLD AUTO: 37.8 %
HGB BLD-MCNC: 12.5 G/DL
INR PPP: 1.2
LYMPHOCYTES # BLD AUTO: 1.9 K/UL
LYMPHOCYTES NFR BLD: 37 %
MCH RBC QN AUTO: 30.3 PG
MCHC RBC AUTO-ENTMCNC: 33.1 G/DL
MCV RBC AUTO: 92 FL
MONOCYTES # BLD AUTO: 0.5 K/UL
MONOCYTES NFR BLD: 9.5 %
NEUTROPHILS # BLD AUTO: 2.4 K/UL
NEUTROPHILS NFR BLD: 46.6 %
PLATELET # BLD AUTO: 276 K/UL
PMV BLD AUTO: 9.8 FL
POTASSIUM SERPL-SCNC: 4.2 MMOL/L
PROT SERPL-MCNC: 8 G/DL
PROTHROMBIN TIME: 12.2 SEC
RBC # BLD AUTO: 4.13 M/UL
SODIUM SERPL-SCNC: 139 MMOL/L
TROPONIN I SERPL DL<=0.01 NG/ML-MCNC: <0.006 NG/ML
WBC # BLD AUTO: 5.24 K/UL

## 2018-10-04 PROCEDURE — 93010 ELECTROCARDIOGRAM REPORT: CPT | Mod: ,,, | Performed by: INTERNAL MEDICINE

## 2018-10-04 PROCEDURE — 83880 ASSAY OF NATRIURETIC PEPTIDE: CPT

## 2018-10-04 PROCEDURE — 85025 COMPLETE CBC W/AUTO DIFF WBC: CPT

## 2018-10-04 PROCEDURE — 80053 COMPREHEN METABOLIC PANEL: CPT

## 2018-10-04 PROCEDURE — 25000003 PHARM REV CODE 250: Performed by: EMERGENCY MEDICINE

## 2018-10-04 PROCEDURE — 85730 THROMBOPLASTIN TIME PARTIAL: CPT

## 2018-10-04 PROCEDURE — 84484 ASSAY OF TROPONIN QUANT: CPT

## 2018-10-04 PROCEDURE — 85610 PROTHROMBIN TIME: CPT

## 2018-10-04 PROCEDURE — 99285 EMERGENCY DEPT VISIT HI MDM: CPT | Mod: 25

## 2018-10-04 PROCEDURE — 36415 COLL VENOUS BLD VENIPUNCTURE: CPT

## 2018-10-04 RX ORDER — DOCUSATE SODIUM 100 MG/1
100 CAPSULE, LIQUID FILLED ORAL ONCE
Status: COMPLETED | OUTPATIENT
Start: 2018-10-04 | End: 2018-10-04

## 2018-10-04 RX ORDER — HYDROCORTISONE 25 MG/G
CREAM TOPICAL 2 TIMES DAILY
Qty: 1 TUBE | Refills: 0 | Status: SHIPPED | OUTPATIENT
Start: 2018-10-04 | End: 2019-06-25 | Stop reason: SDUPTHER

## 2018-10-04 RX ORDER — HYDROCORTISONE 25 MG/G
CREAM TOPICAL 2 TIMES DAILY
Qty: 1 TUBE | Refills: 0 | Status: SHIPPED | OUTPATIENT
Start: 2018-10-04 | End: 2018-10-04 | Stop reason: SDUPTHER

## 2018-10-04 RX ORDER — DOCUSATE SODIUM 100 MG/1
100 CAPSULE, LIQUID FILLED ORAL 2 TIMES DAILY PRN
Qty: 40 CAPSULE | Refills: 0 | Status: SHIPPED | OUTPATIENT
Start: 2018-10-04

## 2018-10-04 RX ADMIN — DOCUSATE SODIUM 100 MG: 100 CAPSULE, LIQUID FILLED ORAL at 09:10

## 2018-10-04 NOTE — TELEPHONE ENCOUNTER
Spoke with pt mother who is bringing pt to the ER now.  Called ER and spoke with Wayne and faxed over paperwork from [facility].     ----- Message from Gail Spears sent at 10/4/2018 12:46 PM CDT -----  Contact: Namita pt Mom  Matt states her daughter is very ill and she needs nurse to contact her regarding a Disc from [place] to look at her heart, matt states she's really unsure of what doctor needs it for, but no one contacted her and she really needs to get in touch with someone because she doesn't know anything else to do.

## 2018-10-04 NOTE — ED NOTES
Patient complains of CP and back pain / left arm pain. Symptoms have been present since this morning. Patient describes symptoms as intermittent heaviness in chest. Patient denies n/v.    Level of Consciousness: The patient is awake, alert, and oriented with appropriate affect and speech; oriented to person, place and time.  Appearance: Sitting up in bed with no acute distress noted. Clothing and hygiene are clean and worn appropriately.  Skin: Skin is warm and dry with good skin turgor; intact; color consistent with ethnicity.  Mucous membranes are moist.   Musculoskeletal: Moves all extremities well in full range of motion. No obvious deformities or swelling noted.  Respiratory: Airway open and patent, respirations spontaneous, even and unlabored. No accessory muscles in use. Breath sounds clear.  Cardiac: Regular rate and rhythm, no peripheral edema noted, good pulses palpated peripherally, capillary refill < 3 seconds.  Abdomen: Soft, non-tender to palpation. No distention noted.  Neurologic: PERRLA, face exhibits symmetrical expression, hand grasps equal and even bilaterally, reports normal sensation to all extremities and face.  Psychosocial:Calm and cooperative.     Patient verbalized understanding of status and plan of care. Patient changed into hospital gown with assistance. Side rails up x 2, call light in reach, bed low and locked. Cardiac monitor applied to patient; alarms on, audible, and set. Will continue to monitor.

## 2018-10-04 NOTE — ED NOTES
"Pt reports midsternal chest "heaviness", intermittent x 3 weeks. Unknown causing factors. Pt remains on cardiac monitor. Updated on POC. NAD noted. RR e/u, airway open and patent. Will resume care.  "

## 2018-10-05 ENCOUNTER — TELEPHONE (OUTPATIENT)
Dept: CARDIOLOGY | Facility: CLINIC | Age: 53
End: 2018-10-05

## 2018-10-05 NOTE — TELEPHONE ENCOUNTER
Faxed over paper work from [place] today.    ----- Message from Fransico Steele MD sent at 10/5/2018  9:52 AM CDT -----  The fax number in my office is .     Thanks  ----- Message -----  From: Kate Jane MA  Sent: 10/4/2018   9:47 AM  To: Fransico Steele MD, Ivette Bateman Staff    Dr Christopher Weldon has asked that I let you know I have received medical records from [facility] for this pt.  She is in bad shape.  I am waiting on her heart cath CD but don't know when it will arrive.  I did request it urgent.  What number can I fax her records to?  She will need an appointment ASAP.  Thank you.   Kate

## 2018-10-05 NOTE — ED NOTES
POC discussed. Pt lifted in bed and turned to provide comfort and prevent breakdown. NAD. VSS. RR e/u, airway open and patent. Will continue to monitor.

## 2018-10-05 NOTE — ED PROVIDER NOTES
SCRIBE #1 NOTE: I, Libertad Holly, am scribing for, and in the presence of, Sagar Lopez MD. I have scribed the entire note.      History      Chief Complaint   Patient presents with    Chest Pain     pt sent by dr. brunner       Review of patient's allergies indicates:  No Known Allergies     HPI   HPI    10/4/2018, 5:40 PM   History obtained from the patient      History of Present Illness: Tri Staton is a 53 y.o. female patient w/ PMHx of CAD, HTN, and CVA who presents to the Emergency Department for hemorrhoids onset 1 wk ago. Symptoms include pain and bleeding. Symptoms are intermittent and moderate in severity. Associated sxs include back pain and CP. She states that she was at home not feeling well with her hemorrhoids when she experienced some chest pain around 1:30 PM. Patient denies chest pain in the ED. Patient denies any fever, chills, SOB, diaphoresis, palpitations, extremity weakness/numbness, leg pain/swelling, dizziness, cough, n/v, and all other sxs at this time. Pt reports multiple visits to ED within past month. Pt reports she had a stroke and a cardiac stent placed in 2018 in [place]. She states that she was advised CABG in [place], but elected not to go that route and follow-up with Cardiology in [place]. Pt is following up with cardiology. No prior Tx today. No further complaints or concerns at this time.       Arrival mode: Personal vehicle    PCP: Primary Doctor No       Past Medical History:  Past Medical History:   Diagnosis Date    Coronary artery disease     Coronary artery disease of native artery of native heart with stable angina pectoris 2018    Hypercholesteremia     Hypertension     Neuropathy     Stroke        Past Surgical History:  Past Surgical History:   Procedure Laterality Date     SECTION           Family History:  History reviewed. No pertinent family history.    Social History:  Social History     Tobacco Use    Smoking status: Former  Smoker   Substance and Sexual Activity    Alcohol use: No     Frequency: Never    Drug use: No    Sexual activity: Unknown       ROS   Review of Systems   Constitutional: Negative for chills, diaphoresis and fever.   HENT: Negative for sore throat.    Respiratory: Negative for cough and shortness of breath.    Cardiovascular: Positive for chest pain. Negative for palpitations and leg swelling.   Gastrointestinal: Negative for nausea and vomiting.   Genitourinary: Negative for dysuria.        Hemorroid   Musculoskeletal: Positive for back pain.   Skin: Negative for rash.   Neurological: Negative for dizziness, weakness and numbness.   Hematological: Does not bruise/bleed easily.   All other systems reviewed and are negative.      Physical Exam      Initial Vitals   BP Pulse Resp Temp SpO2   10/04/18 1710 10/04/18 1710 10/04/18 1710 10/04/18 1710 10/04/18 1853   135/78 108 20 98.6 °F (37 °C) 97 %      MAP       --                 Physical Exam  Nursing Notes and Vital Signs Reviewed.  Constitutional: Patient is in no acute distress. Well-developed and well-nourished.  Head: Atraumatic. Normocephalic.  Eyes: PERRL. EOM intact. Conjunctivae are not pale. No scleral icterus.  ENT: Mucous membranes are moist. Oropharynx is clear and symmetric.    Neck: Supple. Full ROM. No lymphadenopathy.  Cardiovascular: Regular rate. Regular rhythm. No murmurs, rubs, or gallops. Distal pulses are 2+ and symmetric.  Pulmonary/Chest: No respiratory distress. Clear to auscultation bilaterally. No wheezing or rales.  Abdominal: Soft and non-distended.  There is no tenderness.  No rebound, guarding, or rigidity. Good bowel sounds.  Genitourinary: No CVA tenderness  Rectal: Female chaperone is present. External hemorrhoid. No bleeding. No discoloration.  Musculoskeletal: Moves all extremities. No obvious deformities. No edema. No calf tenderness.  Skin: Warm and dry.  Neurological:  Alert, awake, and appropriate.  Normal speech.  No acute  "focal neurological deficits are appreciated.  Psychiatric: Normal affect. Good eye contact. Appropriate in content.    ED Course    Procedures  ED Vital Signs:  Vitals:    10/04/18 1710 10/04/18 1712 10/04/18 1720 10/04/18 1731   BP: 135/78 135/78  113/68   Pulse: 108  102 99   Resp: 20   20   Temp: 98.6 °F (37 °C)      TempSrc: Oral      SpO2:       Weight:       Height: 5' 3" (1.6 m)       10/04/18 1733 10/04/18 1851 10/04/18 1852 10/04/18 1853   BP: 113/68  122/71    Pulse: 98   99   Resp: (!) 23   20   Temp:       TempSrc:       SpO2:    97%   Weight:  103.6 kg (228 lb 4.8 oz)     Height:        10/04/18 2046 10/04/18 2146   BP: 130/73 127/70   Pulse: 99 96   Resp: (!) 23 19   Temp:  98.3 °F (36.8 °C)   TempSrc:  Oral   SpO2: 95% 97%   Weight:     Height:         Abnormal Lab Results:  Labs Reviewed   COMPREHENSIVE METABOLIC PANEL - Abnormal; Notable for the following components:       Result Value    CO2 22 (*)     Albumin 3.2 (*)      (*)     ALT 84 (*)     All other components within normal limits   CBC W/ AUTO DIFFERENTIAL   B-TYPE NATRIURETIC PEPTIDE   TROPONIN I   APTT   PROTIME-INR        All Lab Results:  Results for orders placed or performed during the hospital encounter of 10/04/18   CBC auto differential   Result Value Ref Range    WBC 5.24 3.90 - 12.70 K/uL    RBC 4.13 4.00 - 5.40 M/uL    Hemoglobin 12.5 12.0 - 16.0 g/dL    Hematocrit 37.8 37.0 - 48.5 %    MCV 92 82 - 98 fL    MCH 30.3 27.0 - 31.0 pg    MCHC 33.1 32.0 - 36.0 g/dL    RDW 14.3 11.5 - 14.5 %    Platelets 276 150 - 350 K/uL    MPV 9.8 9.2 - 12.9 fL    Gran # (ANC) 2.4 1.8 - 7.7 K/uL    Lymph # 1.9 1.0 - 4.8 K/uL    Mono # 0.5 0.3 - 1.0 K/uL    Eos # 0.3 0.0 - 0.5 K/uL    Baso # 0.02 0.00 - 0.20 K/uL    Gran% 46.6 38.0 - 73.0 %    Lymph% 37.0 18.0 - 48.0 %    Mono% 9.5 4.0 - 15.0 %    Eosinophil% 6.5 0.0 - 8.0 %    Basophil% 0.4 0.0 - 1.9 %    Differential Method Automated    Comprehensive metabolic panel   Result Value Ref Range "    Sodium 139 136 - 145 mmol/L    Potassium 4.2 3.5 - 5.1 mmol/L    Chloride 109 95 - 110 mmol/L    CO2 22 (L) 23 - 29 mmol/L    Glucose 94 70 - 110 mg/dL    BUN, Bld 16 6 - 20 mg/dL    Creatinine 1.0 0.5 - 1.4 mg/dL    Calcium 9.5 8.7 - 10.5 mg/dL    Total Protein 8.0 6.0 - 8.4 g/dL    Albumin 3.2 (L) 3.5 - 5.2 g/dL    Total Bilirubin 0.5 0.1 - 1.0 mg/dL    Alkaline Phosphatase 73 55 - 135 U/L     (H) 10 - 40 U/L    ALT 84 (H) 10 - 44 U/L    Anion Gap 8 8 - 16 mmol/L    eGFR if African American >60 >60 mL/min/1.73 m^2    eGFR if non African American >60 >60 mL/min/1.73 m^2   Brain natriuretic peptide   Result Value Ref Range    BNP 15 0 - 99 pg/mL   Troponin I   Result Value Ref Range    Troponin I <0.006 0.000 - 0.026 ng/mL   APTT   Result Value Ref Range    aPTT 23.7 21.0 - 32.0 sec   Protime-INR   Result Value Ref Range    Prothrombin Time 12.2 9.0 - 12.5 sec    INR 1.2 0.8 - 1.2         Imaging Results:  Imaging Results          X-Ray Chest PA And Lateral (Final result)  Result time 10/04/18 18:18:10    Final result by Jaison Linares MD (10/04/18 18:18:10)                 Impression:      1. No acute chest findings.  2. No change since 09/27/2018.      Electronically signed by: Jaison Linares MD  Date:    10/04/2018  Time:    18:18             Narrative:    EXAMINATION:  XR CHEST PA AND LATERAL    CLINICAL HISTORY:  Chest pain, unspecified    COMPARISON:  09/27/2018    FINDINGS:  Lungs are clear.  Heart size within normal limits.No significant bony findings.                                  The EKG was ordered, reviewed, and independently interpreted by the ED provider.  Interpretation time: 1723  Rate: 102 BPM  Rhythm: sinus tachycardia  Interpretation: RBBB pattern. NO S1Q3T3. No ST depression/elevation. No STEMI.    The Emergency Provider reviewed the vital signs and test results, which are outlined above.    ED Discussion     9:26 PM: Reassessed pt at this time. Pt reports no sxs at this  moment. Pt states her condition has improved at this time. Discussed with pt all pertinent ED information and results. Discussed pt dx and plan of tx. Gave pt all f/u and return to the ED instructions. All questions and concerns were addressed at this time. Pt expresses understanding of information and instructions, and is comfortable with plan to discharge. Pt is stable for discharge. Discussed w/ pt to f/u w/ cardiology ASAP.     I discussed with patient and/or family/caretaker that evaluation in the ED does not suggest any emergent or life threatening medical conditions requiring immediate intervention beyond what was provided in the ED, and I believe patient is safe for discharge.  Regardless, an unremarkable evaluation in the ED does not preclude the development or presence of a serious of life threatening condition. As such, patient was instructed to return immediately for any worsening or change in current symptoms.    I have discussed with patient and/or family/caretaker chest pain precautions, specifically to return for worsening chest pain, shortness of breath, fever, or any concern.  I have low suspicion for cardiopulmonary, vascular, infectious, respiratory, or other emergent medical condition based on my evaluation in the ED.    ED Medication(s):  Medications   docusate sodium capsule 100 mg (100 mg Oral Given 10/4/18 5123)       Follow-up Information     Your Cardiologist as soon as possible.           [facility]    Specialty:  Emergency Medicine  Why:  As needed, If symptoms worsen  Contact information:  [address] [facility] [address] [phone]           Primary Care Physician for hemorrhoids.                   Medical Decision Making    Medical Decision Making:   Clinical Tests:   Lab Tests: Ordered and Reviewed  Radiological Study: Ordered and Reviewed  Medical Tests: Ordered and Reviewed  External hemorroid; will treat with stool softeners and  topical steroid cream; patient also mentioned chest pain that had resolved PTA; no ST depressions/elevation on EKG; troponin within normal limits ruling out MI; based off patient's history of a cardiologist's recommending CABG, do suspect the patient has coronary artery disease that mandates cardiology follow-up; patient states that she has been following up with Cardiology and can follow up in a timely fashion; ER return precautions provided           Scribe Attestation:   Scribe #1: I performed the above scribed service and the documentation accurately describes the services I performed. I attest to the accuracy of the note.    Attending:   Physician Attestation Statement for Scribe #1: I, Sagar Lopez MD, personally performed the services described in this documentation, as scribed by Libertad Holly, in my presence, and it is both accurate and complete.          Clinical Impression       ICD-10-CM ICD-9-CM   1. External hemorrhoid K64.4 455.3   2. Chest pain R07.9 786.50       Disposition:   Disposition: Discharged  Condition: Stable         Sagar Lopez MD  10/12/18 0434       Sagar Lopez MD  10/12/18 0435

## 2018-10-05 NOTE — TELEPHONE ENCOUNTER
Spoke with pt and scheduled follow up.      ----- Message from Bradley Ang RN sent at 10/5/2018 12:40 PM CDT -----  Please call patient.  ----- Message -----  From: Joanna Hodge  Sent: 10/5/2018  12:27 PM  To: [facility] Staff    Pt is returning a call to nurse.          Please call pt back at [phone]( cell)  or [phone]

## 2018-10-09 ENCOUNTER — OFFICE VISIT (OUTPATIENT)
Dept: CARDIOLOGY | Facility: CLINIC | Age: 53
End: 2018-10-09
Payer: MEDICAID

## 2018-10-09 VITALS — HEART RATE: 93 BPM | DIASTOLIC BLOOD PRESSURE: 76 MMHG | SYSTOLIC BLOOD PRESSURE: 96 MMHG

## 2018-10-09 DIAGNOSIS — I10 ESSENTIAL HYPERTENSION: ICD-10-CM

## 2018-10-09 DIAGNOSIS — E78.2 MIXED HYPERLIPIDEMIA: ICD-10-CM

## 2018-10-09 DIAGNOSIS — F14.11 HISTORY OF COCAINE ABUSE: ICD-10-CM

## 2018-10-09 DIAGNOSIS — F10.11 HISTORY OF ALCOHOL ABUSE: ICD-10-CM

## 2018-10-09 DIAGNOSIS — I25.118 CORONARY ARTERY DISEASE OF NATIVE ARTERY OF NATIVE HEART WITH STABLE ANGINA PECTORIS: Primary | ICD-10-CM

## 2018-10-09 DIAGNOSIS — I63.9 CEREBROVASCULAR ACCIDENT (CVA), UNSPECIFIED MECHANISM: ICD-10-CM

## 2018-10-09 DIAGNOSIS — I31.39 PERICARDIAL EFFUSION: ICD-10-CM

## 2018-10-09 PROCEDURE — 99215 OFFICE O/P EST HI 40 MIN: CPT | Mod: S$PBB,,, | Performed by: INTERNAL MEDICINE

## 2018-10-09 PROCEDURE — 99213 OFFICE O/P EST LOW 20 MIN: CPT | Mod: PBBFAC,PO | Performed by: INTERNAL MEDICINE

## 2018-10-09 PROCEDURE — 99999 PR PBB SHADOW E&M-EST. PATIENT-LVL III: CPT | Mod: PBBFAC,,, | Performed by: INTERNAL MEDICINE

## 2018-10-09 RX ORDER — NITROGLYCERIN 0.4 MG/1
0.4 TABLET SUBLINGUAL EVERY 5 MIN PRN
Qty: 60 TABLET | Refills: 12 | Status: SHIPPED | OUTPATIENT
Start: 2018-10-09 | End: 2018-11-09 | Stop reason: SDUPTHER

## 2018-10-09 RX ORDER — HYDRALAZINE HYDROCHLORIDE 25 MG/1
25 TABLET, FILM COATED ORAL 3 TIMES DAILY
Qty: 90 TABLET | Refills: 11 | Status: SHIPPED | OUTPATIENT
Start: 2018-10-09 | End: 2018-11-19 | Stop reason: SDUPTHER

## 2018-10-09 RX ORDER — ISOSORBIDE MONONITRATE 60 MG/1
60 TABLET, EXTENDED RELEASE ORAL 2 TIMES DAILY
Qty: 30 TABLET | Refills: 3 | Status: SHIPPED | OUTPATIENT
Start: 2018-10-09 | End: 2018-11-19 | Stop reason: SDUPTHER

## 2018-10-10 ENCOUNTER — TELEPHONE (OUTPATIENT)
Dept: NEUROLOGY | Facility: CLINIC | Age: 53
End: 2018-10-10

## 2018-10-16 ENCOUNTER — TELEPHONE (OUTPATIENT)
Dept: CARDIOLOGY | Facility: CLINIC | Age: 53
End: 2018-10-16

## 2018-10-16 NOTE — TELEPHONE ENCOUNTER
Spoke with pts mother who needed to clarify Eliquis:  According to note pt is to take Eliquis two tablets twice daily for seven days, then to take one tablet twice a day.      ----- Message from Shireen Bryan sent at 10/16/2018  1:37 PM CDT -----  Contact: mom  Please call pt mom @ [phone] regarding pt blood thinner medication, need to clarify

## 2018-10-23 ENCOUNTER — OFFICE VISIT (OUTPATIENT)
Dept: CARDIOLOGY | Facility: CLINIC | Age: 53
End: 2018-10-23
Payer: MEDICAID

## 2018-10-23 VITALS
HEART RATE: 93 BPM | WEIGHT: 211 LBS | BODY MASS INDEX: 37.39 KG/M2 | HEIGHT: 63 IN | DIASTOLIC BLOOD PRESSURE: 68 MMHG | SYSTOLIC BLOOD PRESSURE: 106 MMHG

## 2018-10-23 DIAGNOSIS — I63.9 CEREBROVASCULAR ACCIDENT (CVA), UNSPECIFIED MECHANISM: ICD-10-CM

## 2018-10-23 DIAGNOSIS — I10 ESSENTIAL HYPERTENSION: ICD-10-CM

## 2018-10-23 DIAGNOSIS — I31.39 PERICARDIAL EFFUSION: ICD-10-CM

## 2018-10-23 DIAGNOSIS — O22.30 DVT (DEEP VEIN THROMBOSIS) IN PREGNANCY: ICD-10-CM

## 2018-10-23 DIAGNOSIS — E78.2 MIXED HYPERLIPIDEMIA: ICD-10-CM

## 2018-10-23 DIAGNOSIS — I25.118 CORONARY ARTERY DISEASE OF NATIVE ARTERY OF NATIVE HEART WITH STABLE ANGINA PECTORIS: Primary | ICD-10-CM

## 2018-10-23 PROCEDURE — 99214 OFFICE O/P EST MOD 30 MIN: CPT | Mod: S$PBB,,, | Performed by: INTERNAL MEDICINE

## 2018-10-23 PROCEDURE — 99999 PR PBB SHADOW E&M-EST. PATIENT-LVL III: CPT | Mod: PBBFAC,,, | Performed by: INTERNAL MEDICINE

## 2018-10-23 PROCEDURE — 99213 OFFICE O/P EST LOW 20 MIN: CPT | Mod: PBBFAC,PO | Performed by: INTERNAL MEDICINE

## 2018-10-23 NOTE — PROGRESS NOTES
Subjective:   Patient ID:  Tri Staton is a 53 y.o. female who presents for follow up of Coronary Artery Disease and Follow-up (pain in left arm and leg)      52 yo female, f/u for severe multi-vessel CAD. Wheelchair bound after CVA.  PMH recent Dx of CAD and CVA with residual left side weakness in , DVT, HTN, MI, hypercholesteremia, and neuropathy.  , had chest pain, dyspnea and could not move. Went to [facility]. Dx of CVA. D/c'ed. Few days later, felt worse and fever. Went to hospital again. Had cath done and CABG was advised. Pt decided to go back to  for medical care.   Went to ER [facility] on  for LLE swelling and pain.  US revealed positive DVT and started eliquis. Chest CTA moderate pericardial effusion and no PE. D/c'ed from ER.  10/04, c/o chest pain and weakness. With hemarroids. Sent to ER and troponin < 0.006 and EKG did not show acute stt change. BNP normal  No angina in the past two weeks.  VARGAS while moving at home. No orthopnea and PND.  Feels fatigue.  Left side weakness and leg swelling/numbness.     ECHo showed normal EF and moderate pericardial effusion. No tamponade.   Quit smoking 3 months, 1 cigar a day.x 10 yrs  Quit drinking 6 months, 1 pint of liquor a day x10 yrs.  Smoked marijuana  Smoked cocaines one yr ago for 10 yrs, remote IV cocaine 25 yrs ago.              Past Medical History:   Diagnosis Date    Coronary artery disease     Coronary artery disease of native artery of native heart with stable angina pectoris 2018    Hypercholesteremia     Hypertension     Neuropathy     Stroke        Past Surgical History:   Procedure Laterality Date     SECTION         Social History     Tobacco Use    Smoking status: Former Smoker   Substance Use Topics    Alcohol use: No     Frequency: Never    Drug use: No       History reviewed. No pertinent family history.      Review of Systems   Constitution: Negative for decreased  appetite, diaphoresis, fever, weakness, malaise/fatigue and night sweats.   HENT: Negative for nosebleeds.    Eyes: Negative for blurred vision and double vision.   Cardiovascular: Positive for leg swelling. Negative for chest pain, claudication, dyspnea on exertion, irregular heartbeat, near-syncope, orthopnea, palpitations, paroxysmal nocturnal dyspnea and syncope.   Respiratory: Negative for cough, shortness of breath, sleep disturbances due to breathing, snoring, sputum production and wheezing.    Endocrine: Negative for cold intolerance and polyuria.   Hematologic/Lymphatic: Does not bruise/bleed easily.   Skin: Negative for rash.   Musculoskeletal: Positive for muscle weakness. Negative for back pain, falls, joint pain, joint swelling and neck pain.   Gastrointestinal: Negative for abdominal pain, heartburn, nausea and vomiting.   Genitourinary: Negative for dysuria, frequency and hematuria.   Neurological: Positive for loss of balance. Negative for difficulty with concentration, dizziness, focal weakness, headaches, light-headedness, numbness and seizures.   Psychiatric/Behavioral: Negative for depression, memory loss and substance abuse. The patient does not have insomnia.    Allergic/Immunologic: Negative for HIV exposure and hives.       Objective:   Physical Exam   Constitutional: She is oriented to person, place, and time. She appears well-nourished.   HENT:   Head: Normocephalic.   Eyes: Pupils are equal, round, and reactive to light.   Neck: Normal carotid pulses and no JVD present. Carotid bruit is not present. No thyromegaly present.   Cardiovascular: Normal rate, regular rhythm, normal heart sounds and normal pulses.  No extrasystoles are present. PMI is not displaced. Exam reveals no gallop and no S3.   No murmur heard.  Pulmonary/Chest: Breath sounds normal. No stridor. No respiratory distress.   Abdominal: Soft. Bowel sounds are normal. There is no tenderness. There is no rebound.    Musculoskeletal: Normal range of motion. She exhibits no edema.   Trace edema   Neurological: She is alert and oriented to person, place, and time.   Skin: Skin is intact. No rash noted.   Psychiatric: Her behavior is normal.       No results found for: CHOL  No results found for: HDL  No results found for: LDLCALC  No results found for: TRIG  No results found for: CHOLHDL    Chemistry        Component Value Date/Time     10/04/2018 1805    K 4.2 10/04/2018 1805     10/04/2018 1805    CO2 22 (L) 10/04/2018 1805    BUN 16 10/04/2018 1805    CREATININE 1.0 10/04/2018 1805    GLU 94 10/04/2018 1805        Component Value Date/Time    CALCIUM 9.5 10/04/2018 1805    ALKPHOS 73 10/04/2018 1805     (H) 10/04/2018 1805    ALT 84 (H) 10/04/2018 1805    BILITOT 0.5 10/04/2018 1805    ESTGFRAFRICA >60 10/04/2018 1805    EGFRNONAA >60 10/04/2018 1805          No results found for: LABA1C, HGBA1C  No results found for: TSH  Lab Results   Component Value Date    INR 1.2 10/04/2018    INR 1.2 09/27/2018     Lab Results   Component Value Date    WBC 5.24 10/04/2018    HGB 12.5 10/04/2018    HCT 37.8 10/04/2018    MCV 92 10/04/2018     10/04/2018     BMP  Sodium   Date Value Ref Range Status   10/04/2018 139 136 - 145 mmol/L Final     Potassium   Date Value Ref Range Status   10/04/2018 4.2 3.5 - 5.1 mmol/L Final     Chloride   Date Value Ref Range Status   10/04/2018 109 95 - 110 mmol/L Final     CO2   Date Value Ref Range Status   10/04/2018 22 (L) 23 - 29 mmol/L Final     BUN, Bld   Date Value Ref Range Status   10/04/2018 16 6 - 20 mg/dL Final     Creatinine   Date Value Ref Range Status   10/04/2018 1.0 0.5 - 1.4 mg/dL Final     Calcium   Date Value Ref Range Status   10/04/2018 9.5 8.7 - 10.5 mg/dL Final     Anion Gap   Date Value Ref Range Status   10/04/2018 8 8 - 16 mmol/L Final     eGFR if    Date Value Ref Range Status   10/04/2018 >60 >60 mL/min/1.73 m^2 Final     eGFR if non     Date Value Ref Range Status   10/04/2018 >60 >60 mL/min/1.73 m^2 Final     Comment:     Calculation used to obtain the estimated glomerular filtration  rate (eGFR) is the CKD-EPI equation.        BNP  @LABRCNTIP(BNP,BNPTRIAGEBLO)@  @LABRCNTIP(troponini)@  CrCl cannot be calculated (Patient's most recent lab result is older than the maximum 7 days allowed.).  No results found in the last 24 hours.  No results found in the last 24 hours.  No results found in the last 24 hours.    Assessment:      1. Coronary artery disease of native artery of native heart with stable angina pectoris    2. Essential hypertension    3. Mixed hyperlipidemia    4. Pericardial effusion    5. DVT (deep vein thrombosis) in pregnancy    6. Cerebrovascular accident (CVA), unspecified mechanism      Multivessel CAD  Recent CVA and DVT  No CP and CHF  BP controlled  PT pending per PCp  Neurology f/u pending    Plan:   Continue ASA, Eliquis, BB, ACEI, Imdur and Hydralazine  CVt eval pending  RTC in 3 months

## 2018-11-03 ENCOUNTER — HOSPITAL ENCOUNTER (OUTPATIENT)
Facility: HOSPITAL | Age: 53
Discharge: HOME | DRG: 202 | End: 2018-11-05
Attending: FAMILY MEDICINE | Admitting: INTERNAL MEDICINE
Payer: MEDICAID

## 2018-11-03 DIAGNOSIS — I82.90 VTE (VENOUS THROMBOEMBOLISM): ICD-10-CM

## 2018-11-03 DIAGNOSIS — R65.10 SIRS (SYSTEMIC INFLAMMATORY RESPONSE SYNDROME): ICD-10-CM

## 2018-11-03 DIAGNOSIS — R06.02 SOB (SHORTNESS OF BREATH): ICD-10-CM

## 2018-11-03 DIAGNOSIS — R07.9 CHEST PAIN, UNSPECIFIED TYPE: Primary | ICD-10-CM

## 2018-11-03 DIAGNOSIS — J20.9 ACUTE BRONCHITIS, UNSPECIFIED ORGANISM: ICD-10-CM

## 2018-11-03 DIAGNOSIS — R07.9 CHEST PAIN: ICD-10-CM

## 2018-11-03 DIAGNOSIS — R00.0 TACHYCARDIA: ICD-10-CM

## 2018-11-03 LAB
ALBUMIN SERPL BCP-MCNC: 2.9 G/DL
ALLENS TEST: ABNORMAL
ALP SERPL-CCNC: 86 U/L
ALT SERPL W/O P-5'-P-CCNC: 50 U/L
ANION GAP SERPL CALC-SCNC: 12 MMOL/L
APTT BLDCRRT: 30.2 SEC
AST SERPL-CCNC: 67 U/L
BACTERIA #/AREA URNS HPF: ABNORMAL /HPF
BASOPHILS # BLD AUTO: 0.03 K/UL
BASOPHILS NFR BLD: 0.4 %
BILIRUB SERPL-MCNC: 0.8 MG/DL
BILIRUB UR QL STRIP: NEGATIVE
BNP SERPL-MCNC: 34 PG/ML
BUN SERPL-MCNC: 12 MG/DL
CALCIUM SERPL-MCNC: 9.5 MG/DL
CHLORIDE SERPL-SCNC: 106 MMOL/L
CLARITY UR: CLEAR
CO2 SERPL-SCNC: 19 MMOL/L
COLOR UR: YELLOW
CREAT SERPL-MCNC: 0.9 MG/DL
DELSYS: ABNORMAL
DIFFERENTIAL METHOD: ABNORMAL
EOSINOPHIL # BLD AUTO: 0.4 K/UL
EOSINOPHIL NFR BLD: 4.5 %
ERYTHROCYTE [DISTWIDTH] IN BLOOD BY AUTOMATED COUNT: 14.8 %
EST. GFR  (AFRICAN AMERICAN): >60 ML/MIN/1.73 M^2
EST. GFR  (NON AFRICAN AMERICAN): >60 ML/MIN/1.73 M^2
FIO2: 21
GLUCOSE SERPL-MCNC: 108 MG/DL
GLUCOSE UR QL STRIP: NEGATIVE
HCO3 UR-SCNC: 27.7 MMOL/L (ref 24–28)
HCT VFR BLD AUTO: 35.7 %
HGB BLD-MCNC: 12.2 G/DL
HGB UR QL STRIP: NEGATIVE
HYALINE CASTS #/AREA URNS LPF: 1 /LPF
INFLUENZA A, MOLECULAR: NEGATIVE
INFLUENZA B, MOLECULAR: NEGATIVE
INR PPP: 1.2
KETONES UR QL STRIP: NEGATIVE
LACTATE SERPL-SCNC: 1.3 MMOL/L
LEUKOCYTE ESTERASE UR QL STRIP: ABNORMAL
LIPASE SERPL-CCNC: 28 U/L
LYMPHOCYTES # BLD AUTO: 2.6 K/UL
LYMPHOCYTES NFR BLD: 32.8 %
MAGNESIUM SERPL-MCNC: 1.6 MG/DL
MCH RBC QN AUTO: 31 PG
MCHC RBC AUTO-ENTMCNC: 34.2 G/DL
MCV RBC AUTO: 91 FL
MICROSCOPIC COMMENT: ABNORMAL
MODE: ABNORMAL
MONOCYTES # BLD AUTO: 0.8 K/UL
MONOCYTES NFR BLD: 10.2 %
NEUTROPHILS # BLD AUTO: 4.2 K/UL
NEUTROPHILS NFR BLD: 52.1 %
NITRITE UR QL STRIP: NEGATIVE
PCO2 BLDA: 36 MMHG (ref 35–45)
PH SMN: 7.49 [PH] (ref 7.35–7.45)
PH UR STRIP: 6 [PH] (ref 5–8)
PHOSPHATE SERPL-MCNC: 4.2 MG/DL
PLATELET # BLD AUTO: 254 K/UL
PMV BLD AUTO: 10 FL
PO2 BLDA: 26 MMHG (ref 40–60)
POC BE: 4 MMOL/L
POC SATURATED O2: 55 % (ref 95–100)
POTASSIUM SERPL-SCNC: 4.1 MMOL/L
PROCALCITONIN SERPL IA-MCNC: 0.06 NG/ML
PROT SERPL-MCNC: 7.9 G/DL
PROT UR QL STRIP: NEGATIVE
PROTHROMBIN TIME: 12.1 SEC
RBC # BLD AUTO: 3.93 M/UL
RBC #/AREA URNS HPF: 5 /HPF (ref 0–4)
SAMPLE: ABNORMAL
SITE: ABNORMAL
SODIUM SERPL-SCNC: 137 MMOL/L
SP GR UR STRIP: 1.02 (ref 1–1.03)
SPECIMEN SOURCE: NORMAL
SQUAMOUS #/AREA URNS HPF: 3 /HPF
TROPONIN I SERPL DL<=0.01 NG/ML-MCNC: 0.03 NG/ML
URN SPEC COLLECT METH UR: ABNORMAL
UROBILINOGEN UR STRIP-ACNC: NEGATIVE EU/DL
WBC # BLD AUTO: 7.98 K/UL
WBC #/AREA URNS HPF: 50 /HPF (ref 0–5)

## 2018-11-03 PROCEDURE — 96365 THER/PROPH/DIAG IV INF INIT: CPT

## 2018-11-03 PROCEDURE — 83735 ASSAY OF MAGNESIUM: CPT

## 2018-11-03 PROCEDURE — 80053 COMPREHEN METABOLIC PANEL: CPT

## 2018-11-03 PROCEDURE — 84100 ASSAY OF PHOSPHORUS: CPT

## 2018-11-03 PROCEDURE — 87502 INFLUENZA DNA AMP PROBE: CPT

## 2018-11-03 PROCEDURE — 25000003 PHARM REV CODE 250: Performed by: FAMILY MEDICINE

## 2018-11-03 PROCEDURE — 99900035 HC TECH TIME PER 15 MIN (STAT)

## 2018-11-03 PROCEDURE — 96361 HYDRATE IV INFUSION ADD-ON: CPT

## 2018-11-03 PROCEDURE — 87086 URINE CULTURE/COLONY COUNT: CPT

## 2018-11-03 PROCEDURE — 85730 THROMBOPLASTIN TIME PARTIAL: CPT

## 2018-11-03 PROCEDURE — 93010 ELECTROCARDIOGRAM REPORT: CPT | Mod: ,,, | Performed by: INTERNAL MEDICINE

## 2018-11-03 PROCEDURE — 83690 ASSAY OF LIPASE: CPT

## 2018-11-03 PROCEDURE — 82803 BLOOD GASES ANY COMBINATION: CPT

## 2018-11-03 PROCEDURE — 96375 TX/PRO/DX INJ NEW DRUG ADDON: CPT | Mod: 59

## 2018-11-03 PROCEDURE — 96368 THER/DIAG CONCURRENT INF: CPT

## 2018-11-03 PROCEDURE — 84145 PROCALCITONIN (PCT): CPT

## 2018-11-03 PROCEDURE — 81000 URINALYSIS NONAUTO W/SCOPE: CPT

## 2018-11-03 PROCEDURE — 96372 THER/PROPH/DIAG INJ SC/IM: CPT | Mod: 59

## 2018-11-03 PROCEDURE — 11000001 HC ACUTE MED/SURG PRIVATE ROOM

## 2018-11-03 PROCEDURE — 85025 COMPLETE CBC W/AUTO DIFF WBC: CPT

## 2018-11-03 PROCEDURE — G0378 HOSPITAL OBSERVATION PER HR: HCPCS

## 2018-11-03 PROCEDURE — 85610 PROTHROMBIN TIME: CPT

## 2018-11-03 PROCEDURE — 84484 ASSAY OF TROPONIN QUANT: CPT

## 2018-11-03 PROCEDURE — 83880 ASSAY OF NATRIURETIC PEPTIDE: CPT

## 2018-11-03 PROCEDURE — 99285 EMERGENCY DEPT VISIT HI MDM: CPT | Mod: 25

## 2018-11-03 PROCEDURE — 87040 BLOOD CULTURE FOR BACTERIA: CPT | Mod: 59

## 2018-11-03 PROCEDURE — 83605 ASSAY OF LACTIC ACID: CPT

## 2018-11-03 RX ORDER — IPRATROPIUM BROMIDE AND ALBUTEROL SULFATE 2.5; .5 MG/3ML; MG/3ML
3 SOLUTION RESPIRATORY (INHALATION)
Status: COMPLETED | OUTPATIENT
Start: 2018-11-04 | End: 2018-11-04

## 2018-11-03 RX ORDER — ASPIRIN 325 MG
325 TABLET ORAL
Status: COMPLETED | OUTPATIENT
Start: 2018-11-03 | End: 2018-11-03

## 2018-11-03 RX ORDER — METHYLPREDNISOLONE SOD SUCC 125 MG
125 VIAL (EA) INJECTION
Status: COMPLETED | OUTPATIENT
Start: 2018-11-04 | End: 2018-11-03

## 2018-11-03 RX ORDER — GABAPENTIN 300 MG/1
600 CAPSULE ORAL ONCE
Status: COMPLETED | OUTPATIENT
Start: 2018-11-04 | End: 2018-11-03

## 2018-11-03 RX ADMIN — METHYLPREDNISOLONE SODIUM SUCCINATE 125 MG: 125 INJECTION, POWDER, FOR SOLUTION INTRAMUSCULAR; INTRAVENOUS at 11:11

## 2018-11-03 RX ADMIN — SODIUM CHLORIDE 2901 ML: 0.9 INJECTION, SOLUTION INTRAVENOUS at 09:11

## 2018-11-03 RX ADMIN — IPRATROPIUM BROMIDE AND ALBUTEROL SULFATE 3 ML: .5; 3 SOLUTION RESPIRATORY (INHALATION) at 11:11

## 2018-11-03 RX ADMIN — GABAPENTIN 600 MG: 300 CAPSULE ORAL at 11:11

## 2018-11-03 RX ADMIN — ASPIRIN 325 MG ORAL TABLET 325 MG: 325 PILL ORAL at 11:11

## 2018-11-04 PROBLEM — J20.9 ACUTE BRONCHITIS: Status: ACTIVE | Noted: 2018-11-04

## 2018-11-04 PROBLEM — R07.9 CHEST PAIN: Status: ACTIVE | Noted: 2018-11-04

## 2018-11-04 PROBLEM — I82.90 VTE (VENOUS THROMBOEMBOLISM): Status: ACTIVE | Noted: 2018-11-04

## 2018-11-04 LAB
LACTATE SERPL-SCNC: 1.4 MMOL/L
TROPONIN I SERPL DL<=0.01 NG/ML-MCNC: 0.01 NG/ML
TROPONIN I SERPL DL<=0.01 NG/ML-MCNC: 0.02 NG/ML
TROPONIN I SERPL DL<=0.01 NG/ML-MCNC: <0.006 NG/ML
TROPONIN I SERPL DL<=0.01 NG/ML-MCNC: <0.006 NG/ML

## 2018-11-04 PROCEDURE — 84484 ASSAY OF TROPONIN QUANT: CPT

## 2018-11-04 PROCEDURE — 63600175 PHARM REV CODE 636 W HCPCS: Performed by: INTERNAL MEDICINE

## 2018-11-04 PROCEDURE — 36415 COLL VENOUS BLD VENIPUNCTURE: CPT

## 2018-11-04 PROCEDURE — 97166 OT EVAL MOD COMPLEX 45 MIN: CPT

## 2018-11-04 PROCEDURE — 93306 TTE W/DOPPLER COMPLETE: CPT

## 2018-11-04 PROCEDURE — 25000003 PHARM REV CODE 250: Performed by: INTERNAL MEDICINE

## 2018-11-04 PROCEDURE — G8979 MOBILITY GOAL STATUS: HCPCS | Mod: CJ

## 2018-11-04 PROCEDURE — 25000242 PHARM REV CODE 250 ALT 637 W/ HCPCS: Performed by: INTERNAL MEDICINE

## 2018-11-04 PROCEDURE — 97530 THERAPEUTIC ACTIVITIES: CPT

## 2018-11-04 PROCEDURE — G0378 HOSPITAL OBSERVATION PER HR: HCPCS

## 2018-11-04 PROCEDURE — 25000242 PHARM REV CODE 250 ALT 637 W/ HCPCS: Performed by: FAMILY MEDICINE

## 2018-11-04 PROCEDURE — G8978 MOBILITY CURRENT STATUS: HCPCS | Mod: CL

## 2018-11-04 PROCEDURE — G8987 SELF CARE CURRENT STATUS: HCPCS | Mod: CM

## 2018-11-04 PROCEDURE — 25000003 PHARM REV CODE 250: Performed by: FAMILY MEDICINE

## 2018-11-04 PROCEDURE — 93306 TTE W/DOPPLER COMPLETE: CPT | Mod: 26,,, | Performed by: INTERNAL MEDICINE

## 2018-11-04 PROCEDURE — G8988 SELF CARE GOAL STATUS: HCPCS | Mod: CK

## 2018-11-04 PROCEDURE — 63600175 PHARM REV CODE 636 W HCPCS: Performed by: FAMILY MEDICINE

## 2018-11-04 PROCEDURE — 84484 ASSAY OF TROPONIN QUANT: CPT | Mod: 91

## 2018-11-04 PROCEDURE — 94640 AIRWAY INHALATION TREATMENT: CPT | Mod: 59

## 2018-11-04 PROCEDURE — 25500020 PHARM REV CODE 255: Performed by: FAMILY MEDICINE

## 2018-11-04 PROCEDURE — 21400001 HC TELEMETRY ROOM

## 2018-11-04 PROCEDURE — 83605 ASSAY OF LACTIC ACID: CPT

## 2018-11-04 PROCEDURE — 97162 PT EVAL MOD COMPLEX 30 MIN: CPT

## 2018-11-04 RX ORDER — IPRATROPIUM BROMIDE AND ALBUTEROL SULFATE 2.5; .5 MG/3ML; MG/3ML
3 SOLUTION RESPIRATORY (INHALATION) EVERY 6 HOURS
Status: DISCONTINUED | OUTPATIENT
Start: 2018-11-04 | End: 2018-11-05 | Stop reason: HOSPADM

## 2018-11-04 RX ORDER — HYDRALAZINE HYDROCHLORIDE 25 MG/1
25 TABLET, FILM COATED ORAL 3 TIMES DAILY
Status: DISCONTINUED | OUTPATIENT
Start: 2018-11-04 | End: 2018-11-05 | Stop reason: HOSPADM

## 2018-11-04 RX ORDER — ENOXAPARIN SODIUM 100 MG/ML
1 INJECTION SUBCUTANEOUS
Status: DISCONTINUED | OUTPATIENT
Start: 2018-11-04 | End: 2018-11-05 | Stop reason: HOSPADM

## 2018-11-04 RX ORDER — OXYBUTYNIN CHLORIDE 5 MG/1
5 TABLET ORAL 2 TIMES DAILY
Status: DISCONTINUED | OUTPATIENT
Start: 2018-11-04 | End: 2018-11-05 | Stop reason: HOSPADM

## 2018-11-04 RX ORDER — GABAPENTIN 300 MG/1
600 CAPSULE ORAL 3 TIMES DAILY
Status: DISCONTINUED | OUTPATIENT
Start: 2018-11-04 | End: 2018-11-05 | Stop reason: HOSPADM

## 2018-11-04 RX ORDER — GUAIFENESIN 100 MG/5ML
200 SOLUTION ORAL EVERY 4 HOURS PRN
Status: DISCONTINUED | OUTPATIENT
Start: 2018-11-04 | End: 2018-11-05 | Stop reason: HOSPADM

## 2018-11-04 RX ORDER — METOPROLOL SUCCINATE 25 MG/1
25 TABLET, EXTENDED RELEASE ORAL DAILY
Status: DISCONTINUED | OUTPATIENT
Start: 2018-11-04 | End: 2018-11-05 | Stop reason: HOSPADM

## 2018-11-04 RX ORDER — LISINOPRIL 20 MG/1
40 TABLET ORAL DAILY
Status: DISCONTINUED | OUTPATIENT
Start: 2018-11-04 | End: 2018-11-05 | Stop reason: HOSPADM

## 2018-11-04 RX ORDER — IPRATROPIUM BROMIDE AND ALBUTEROL SULFATE 2.5; .5 MG/3ML; MG/3ML
3 SOLUTION RESPIRATORY (INHALATION) EVERY 4 HOURS PRN
Status: DISCONTINUED | OUTPATIENT
Start: 2018-11-04 | End: 2018-11-04

## 2018-11-04 RX ORDER — ATORVASTATIN CALCIUM 40 MG/1
80 TABLET, FILM COATED ORAL DAILY
Status: DISCONTINUED | OUTPATIENT
Start: 2018-11-04 | End: 2018-11-05 | Stop reason: HOSPADM

## 2018-11-04 RX ORDER — MAG HYDROX/ALUMINUM HYD/SIMETH 200-200-20
30 SUSPENSION, ORAL (FINAL DOSE FORM) ORAL EVERY 6 HOURS PRN
Status: DISCONTINUED | OUTPATIENT
Start: 2018-11-04 | End: 2018-11-05 | Stop reason: HOSPADM

## 2018-11-04 RX ORDER — ASPIRIN 81 MG/1
81 TABLET ORAL DAILY
Status: DISCONTINUED | OUTPATIENT
Start: 2018-11-04 | End: 2018-11-05 | Stop reason: HOSPADM

## 2018-11-04 RX ORDER — ONDANSETRON 2 MG/ML
4 INJECTION INTRAMUSCULAR; INTRAVENOUS EVERY 8 HOURS PRN
Status: DISCONTINUED | OUTPATIENT
Start: 2018-11-04 | End: 2018-11-05 | Stop reason: HOSPADM

## 2018-11-04 RX ORDER — ISOSORBIDE MONONITRATE 60 MG/1
60 TABLET, EXTENDED RELEASE ORAL DAILY
Status: DISCONTINUED | OUTPATIENT
Start: 2018-11-04 | End: 2018-11-05 | Stop reason: HOSPADM

## 2018-11-04 RX ORDER — ACETAMINOPHEN 325 MG/1
650 TABLET ORAL EVERY 6 HOURS PRN
Status: DISCONTINUED | OUTPATIENT
Start: 2018-11-04 | End: 2018-11-05 | Stop reason: HOSPADM

## 2018-11-04 RX ADMIN — HYDRALAZINE HYDROCHLORIDE 25 MG: 25 TABLET, FILM COATED ORAL at 09:11

## 2018-11-04 RX ADMIN — GABAPENTIN 600 MG: 300 CAPSULE ORAL at 09:11

## 2018-11-04 RX ADMIN — OXYBUTYNIN CHLORIDE 5 MG: 5 TABLET ORAL at 08:11

## 2018-11-04 RX ADMIN — OXYBUTYNIN CHLORIDE 5 MG: 5 TABLET ORAL at 09:11

## 2018-11-04 RX ADMIN — IOHEXOL 100 ML: 350 INJECTION, SOLUTION INTRAVENOUS at 12:11

## 2018-11-04 RX ADMIN — ENOXAPARIN SODIUM 100 MG: 100 INJECTION SUBCUTANEOUS at 02:11

## 2018-11-04 RX ADMIN — GABAPENTIN 600 MG: 300 CAPSULE ORAL at 02:11

## 2018-11-04 RX ADMIN — IPRATROPIUM BROMIDE AND ALBUTEROL SULFATE 3 ML: .5; 3 SOLUTION RESPIRATORY (INHALATION) at 08:11

## 2018-11-04 RX ADMIN — HYDRALAZINE HYDROCHLORIDE 25 MG: 25 TABLET, FILM COATED ORAL at 02:11

## 2018-11-04 RX ADMIN — CEFTRIAXONE 1 G: 1 INJECTION, SOLUTION INTRAVENOUS at 02:11

## 2018-11-04 RX ADMIN — GABAPENTIN 600 MG: 300 CAPSULE ORAL at 08:11

## 2018-11-04 RX ADMIN — IPRATROPIUM BROMIDE AND ALBUTEROL SULFATE 3 ML: .5; 3 SOLUTION RESPIRATORY (INHALATION) at 12:11

## 2018-11-04 RX ADMIN — IPRATROPIUM BROMIDE AND ALBUTEROL SULFATE 3 ML: .5; 3 SOLUTION RESPIRATORY (INHALATION) at 01:11

## 2018-11-04 RX ADMIN — ASPIRIN 81 MG: 81 TABLET, COATED ORAL at 08:11

## 2018-11-04 RX ADMIN — IPRATROPIUM BROMIDE AND ALBUTEROL SULFATE 3 ML: .5; 3 SOLUTION RESPIRATORY (INHALATION) at 07:11

## 2018-11-04 RX ADMIN — AZITHROMYCIN MONOHYDRATE 500 MG: 500 INJECTION, POWDER, LYOPHILIZED, FOR SOLUTION INTRAVENOUS at 02:11

## 2018-11-04 RX ADMIN — ATORVASTATIN CALCIUM 80 MG: 40 TABLET, FILM COATED ORAL at 08:11

## 2018-11-04 NOTE — ASSESSMENT & PLAN NOTE
Low-grade fever of 100.2, nonproductive cough.  Chest x-ray does not show any evidence of infiltrates.  IV Rocephin and Zithromax empirically.  Bronchodilators every 6 hr.

## 2018-11-04 NOTE — HPI
Ms. Shemar levya  a 53-year-old  female with a history of CVA, with residual left-sided deficits, recently suffered an MI one month ago, had a stent placed, and at around the same time was diagnosed with left lower extremity DVT.  Patient has been on aspirin, apixaban, BB, ACEI and statin.  Patient reports compliance with medications..  Today she presented to the ED complaining of low-grade fever of 100.3 at home associated with increasing confusion per family members.  Patient also complains of nonproductive cough, worsening shortness of breath.  WBC and lactic acid are within normal limits.  CTA chest was done that could not exclude pulmonary embolism.  In spite of being on apixaban, and inability to exclude PE, patient was administered Lovenox 1 milligram/kilogram subcu x1 in the ED.  Blood cultures have been obtained.  Patient started on IV Rocephin and Zithromax empirically.  Discussed with multiple family members at the bedside, including patient's mother.

## 2018-11-04 NOTE — ASSESSMENT & PLAN NOTE
Recent history of a CVA with left-sided neurological deficits.  P.T./OT evaluation.  Aspirin daily.  Statin, Ace inhibitor daily.

## 2018-11-04 NOTE — ED NOTES
Patient resting in bed in no acute distress.  Equal rise and fall of chest noted.  Vitals stable.  POC discussed and verbal understanding received.  Patient denies any further needs at this time.  Bed is in low, locked, position with side rails up x 2 and call light within reach.  Awaiting placement to hospital floor.

## 2018-11-04 NOTE — ED NOTES
Patient refusing to allow staff to draw her blood at present time, stating she needs to try to use the bedpan first and does not want to be stuck while she's trying to go.  Patient has call light in hand and has been instructed to push button to alert staff as soon as she is finished.  JESUS MANUEL Miranda notified of delay in getting venous blood sample.  MD informed.

## 2018-11-04 NOTE — ED NOTES
Dr. Espinoza notified that patient is requesting to speak with her concerning update on POC, as well as reassessment of her pain and breathing.  Dr. Espinoza states she will place new orders.

## 2018-11-04 NOTE — SUBJECTIVE & OBJECTIVE
Past Medical History:   Diagnosis Date    Coronary artery disease     Coronary artery disease of native artery of native heart with stable angina pectoris 2018    Hypercholesteremia     Hypertension     Neuropathy     Stroke        Past Surgical History:   Procedure Laterality Date     SECTION         Review of patient's allergies indicates:  No Known Allergies    No current facility-administered medications on file prior to encounter.      Current Outpatient Medications on File Prior to Encounter   Medication Sig    amitriptyline (ELAVIL) 50 MG tablet Take 50 mg by mouth every evening.    apixaban (ELIQUIS) 5 mg Tab Take 5 mg by mouth 2 (two) times daily.    aspirin (ECOTRIN) 81 MG EC tablet Take 81 mg by mouth once daily.    atorvastatin (LIPITOR) 80 MG tablet Take 80 mg by mouth once daily.    docusate sodium (COLACE) 100 MG capsule Take 1 capsule (100 mg total) by mouth 2 (two) times daily as needed for Constipation.    gabapentin (NEURONTIN) 600 MG tablet Take 600 mg by mouth 3 (three) times daily.    hydrALAZINE (APRESOLINE) 25 MG tablet Take 1 tablet (25 mg total) by mouth 3 (three) times daily.    isosorbide mononitrate (IMDUR) 60 MG 24 hr tablet Take 1 tablet (60 mg total) by mouth 2 (two) times daily.    lisinopril (PRINIVIL,ZESTRIL) 40 MG tablet Take 40 mg by mouth once daily.    metoprolol succinate (TOPROL-XL) 25 MG 24 hr tablet Take 1 tablet (25 mg total) by mouth once daily. (Patient taking differently: Take 25 mg by mouth once daily. )    NIFEdipine (ADALAT CC) 30 MG TbSR Take 30 mg by mouth once daily.    oxybutynin (DITROPAN) 5 MG Tab Take 5 mg by mouth 2 (two) times daily.    hydrocortisone 2.5 % cream Apply topically 2 (two) times daily. for 7 days    nitroGLYCERIN (NITROSTAT) 0.4 MG SL tablet Place 1 tablet (0.4 mg total) under the tongue every 5 (five) minutes as needed for Chest pain.     Family History     Problem Relation (Age of Onset)    Hypertension  Mother        Tobacco Use    Smoking status: Former Smoker    Smokeless tobacco: Never Used   Substance and Sexual Activity    Alcohol use: No     Frequency: Never    Drug use: No    Sexual activity: Not on file     Review of Systems   Constitutional: Positive for appetite change, fatigue and fever. Negative for chills and diaphoresis.   HENT: Negative.  Negative for congestion, nosebleeds, rhinorrhea and sinus pressure.    Eyes: Negative.  Negative for photophobia and visual disturbance.   Respiratory: Positive for cough and shortness of breath. Negative for chest tightness and wheezing.    Cardiovascular: Positive for leg swelling (left). Negative for chest pain and palpitations.   Gastrointestinal: Negative.  Negative for abdominal pain, diarrhea, nausea and vomiting.   Endocrine: Negative.  Negative for polyphagia and polyuria.   Genitourinary: Negative.  Negative for dysuria, flank pain, frequency and urgency.   Musculoskeletal: Negative.  Negative for back pain, joint swelling and neck stiffness.   Skin: Negative.  Negative for color change, pallor and rash.   Allergic/Immunologic: Negative.  Negative for immunocompromised state.   Neurological: Negative.  Negative for dizziness, syncope, speech difficulty, numbness and headaches.   Hematological: Negative.  Negative for adenopathy. Does not bruise/bleed easily.   Psychiatric/Behavioral: Positive for confusion and decreased concentration. Negative for hallucinations. The patient is not nervous/anxious.    All other systems reviewed and are negative.    Objective:     Vital Signs (Most Recent):  Temp: 98.6 °F (37 °C) (11/04/18 0051)  Pulse: 109 (11/04/18 0115)  Resp: 20 (11/04/18 0115)  BP: 119/78 (11/04/18 0115)  SpO2: 97 % (11/04/18 0115) Vital Signs (24h Range):  Temp:  [98.6 °F (37 °C)-100.2 °F (37.9 °C)] 98.6 °F (37 °C)  Pulse:  [101-120] 109  Resp:  [18-27] 20  SpO2:  [94 %-98 %] 97 %  BP: (119-136)/(72-80) 119/78     Weight: 96.7 kg (213 lb 3  oz)  Body mass index is 37.76 kg/m².    Physical Exam   Constitutional: She is oriented to person, place, and time. She appears well-developed and well-nourished. No distress.   Ill-appearing  female, in no respiratory distress. Patient's mother and other family member at the bedside.   HENT:   Head: Normocephalic and atraumatic.   Eyes: Conjunctivae and EOM are normal. No scleral icterus.   Neck: Normal range of motion. Neck supple. No tracheal deviation present. No thyromegaly present.   Cardiovascular: Regular rhythm, normal heart sounds and intact distal pulses. Tachycardia present.   No murmur heard.  Pulmonary/Chest: Effort normal. No respiratory distress. She has no wheezes. She has rhonchi. She has no rales. She exhibits no tenderness.   Abdominal: Soft. Bowel sounds are normal. She exhibits no distension. There is no tenderness.   Musculoskeletal: Normal range of motion. She exhibits no edema or tenderness.   Lymphadenopathy:     She has no cervical adenopathy.   Neurological: She is alert and oriented to person, place, and time. No cranial nerve deficit. She exhibits normal muscle tone. Coordination normal.   Skin: Skin is warm and dry. She is not diaphoretic. No erythema.   Psychiatric: She has a normal mood and affect. Her behavior is normal.   Nursing note and vitals reviewed.        CRANIAL NERVES     CN III, IV, VI   Extraocular motions are normal.        Significant Labs:   Bilirubin:   Recent Labs   Lab 11/03/18 2133   BILITOT 0.8     Blood Culture: No results for input(s): LABBLOO in the last 48 hours.  BMP:   Recent Labs   Lab 11/03/18 2133         K 4.1      CO2 19*   BUN 12   CREATININE 0.9   CALCIUM 9.5   MG 1.6     CBC:   Recent Labs   Lab 11/03/18 2133   WBC 7.98   HGB 12.2   HCT 35.7*        CMP:   Recent Labs   Lab 11/03/18 2133      K 4.1      CO2 19*      BUN 12   CREATININE 0.9   CALCIUM 9.5   PROT 7.9   ALBUMIN 2.9*    BILITOT 0.8   ALKPHOS 86   AST 67*   ALT 50*   ANIONGAP 12   EGFRNONAA >60     Cardiac Markers:   Recent Labs   Lab 11/03/18 2133   BNP 34     Coagulation:   Recent Labs   Lab 11/03/18 2133   INR 1.2   APTT 30.2     Lactic Acid:   Recent Labs   Lab 11/03/18 2133 11/04/18  0045   LACTATE 1.3 1.4     Lipase:   Recent Labs   Lab 11/03/18 2133   LIPASE 28     Troponin:   Recent Labs   Lab 11/03/18 2133 11/04/18  0045   TROPONINI 0.029* 0.024     Urine Studies:   Recent Labs   Lab 11/03/18 2237   COLORU Yellow   APPEARANCEUA Clear   PHUR 6.0   SPECGRAV 1.025   PROTEINUA Negative   GLUCUA Negative   KETONESU Negative   BILIRUBINUA Negative   OCCULTUA Negative   NITRITE Negative   UROBILINOGEN Negative   LEUKOCYTESUR 3+*   RBCUA 5*   WBCUA 50*   BACTERIA Few*   SQUAMEPITHEL 3   HYALINECASTS 1     All pertinent labs within the past 24 hours have been reviewed.    Significant Imaging: I have reviewed and interpreted all pertinent imaging results/findings within the past 24 hours.     Imaging Results          CTA Chest Non-Coronary - PE Study (In process)                X-Ray Chest AP Portable (Final result)  Result time 11/03/18 21:04:38    Final result by Sagar Benjamin III, MD (11/03/18 21:04:38)                 Impression:      Negative one view chest x-ray.      Electronically signed by: Sagar Benjamin MD  Date:    11/03/2018  Time:    21:04             Narrative:    EXAMINATION:  XR CHEST AP PORTABLE    CLINICAL HISTORY:  Sepsis;    FINDINGS:  Heart size is normal. The lung fields are clear. No acute pulmonary infiltrate.                                I have independently reviewed and interpreted the EKG.     I have independently reviewed all pertinent labs within the past 24 hours.    I have independently reviewed, visualized and interpreted all pertinent imaging results within the past 24 hours and discussed the findings with the ED physician, Dr. Espinoza.

## 2018-11-04 NOTE — PT/OT/SLP EVAL
Occupational Therapy   Evaluation    Name: Tri Staton  MRN: [id number]  Admitting Diagnosis:  VTE (venous thromboembolism)      Recommendations:     Discharge Recommendations: nursing facility, skilled  Discharge Equipment Recommendations:  (tbd)  Barriers to discharge:  Decreased caregiver support    History:     Occupational Profile:  Living Environment: lives with mother in 1 story house   Previous level of function: (i) with adl's and mod (i) with functional mobility  Roles and Routines: occupational therapy  Equipment Used at Home:  shower chair, rollator  Assistance upon Discharge:     Past Medical History:   Diagnosis Date    Coronary artery disease     Coronary artery disease of native artery of native heart with stable angina pectoris 2018    Hypercholesteremia     Hypertension     Neuropathy     Stroke        Past Surgical History:   Procedure Laterality Date     SECTION         Subjective     Chief Complaint: debility and generalized weakness  Patient/Family Comments/goals:     Pain/Comfort:  · Pain Rating 1: 0/10    Patients cultural, spiritual, Spiritism conflicts given the current situation:      Objective:     Communicated with: nurse and epic chart review prior to session.  Patient found all lines intact, call button in reach, bed alarm on and nurse  notified and telemetry, blood pressure cuff, pulse ox (continuous) upon OT entry to room.    General Precautions: Standard, fall   Orthopedic Precautions:N/A   Braces: N/A     Occupational Performance:    Bed Mobility:    · Patient completed Rolling/Turning to Left with  maximal assistance  · Patient completed Rolling/Turning to Right with maximal assistance  · Patient completed Scooting/Bridging with maximal assistance  · Patient completed Supine to Sit with maximal assistance  · Patient completed Sit to Supine with maximal assistance    Activities of Daily Living:  · Lower Body Dressing: maximal assistance  ".    Cognitive/Visual Perceptual:  Cognitive/Psychosocial Skills:     -       Oriented to: Person, Place, Time and Situation   -       Follows Commands/attention:Follows one-step commands  -       Communication: clear/fluent  -       Memory: No Deficits noted  -       Safety awareness/insight to disability: impaired   Visual/Perceptual:      -Intact .    Physical Exam:  Upper Extremity Range of Motion:     -       Right Upper Extremity: WFL .  -       Left Upper Extremity: WFL . .  Upper Extremity Strength:    -       Right Upper Extremity: mmt: 3/5 grossly  -       Left Upper Extremity: mmt: 3/5 grossly .   Strength:    -       Right Upper Extremity: mmt: 3/5 grossly .  -       Left Upper Extremity: mmt: 3/5 grossly .    AMPAC 6 Click ADL:  AMPAC Total Score: 15    Treatment & Education:    Education:    Patient left HOB elevated with all lines intact, call button in reach and nurse notified    Assessment:     Tri Staton is a 53 y.o. female with a medical diagnosis of VTE (venous thromboembolism).  She presents with the following performance deficits affecting function: weakness, impaired self care skills, impaired balance, decreased safety awareness, decreased ROM, impaired joint extensibility, impaired endurance, impaired functional mobilty, gait instability.      Rehab Prognosis: Good; patient would benefit from acute skilled OT services to address these deficits and reach maximum level of function.         Clinical Decision Makin.  OT Mod:  "Pt evaluation falls under moderate complexity for evaluation coding due to identification of 3-5 performance deficits noted as stated above. Eval required Min/Mod assistance to complete on this date and detailed assessment(s) were utilized. Moreover, an expanded review of history and occupational profile obtained with additional review of cognitive, physical and psychosocial hx."     Plan:     Patient to be seen 3 x/week to address the above listed " problems via self-care/home management, therapeutic activities, therapeutic exercises  · Plan of Care Expires: 11/11/18  · Plan of Care Reviewed with: patient    This Plan of care has been discussed with the patient who was involved in its development and understands and is in agreement with the identified goals and treatment plan    GOALS:   Multidisciplinary Problems     Occupational Therapy Goals        Problem: Occupational Therapy Goal    Goal Priority Disciplines Outcome Interventions   Occupational Therapy Goal     OT, PT/OT     Description:  ot goals to be bet by 11-11-18  Pt will tolerate 1 set x 10 reps b u e rom exercise with min resistance  Min a with le dressing  Min a with ue dressing                    Time Tracking:     OT Date of Treatment: 11/04/18  OT Start Time: 0930  OT Stop Time: 0955  OT Total Time (min): 25 min    Billable Minutes:Evaluation 10 minutes  Therapeutic Activity 15 minutes    Gissell Holland OT  11/4/2018

## 2018-11-04 NOTE — ASSESSMENT & PLAN NOTE
Recent history of left lower extremity DVT.  CTA today unable to exclude PE.  Patient has been on apixaban at home, will hold.  Low ox 1 mg subcu b.i.d. for now.

## 2018-11-04 NOTE — PT/OT/SLP EVAL
Physical Therapy Evaluation    Patient Name:  Tri Staton   MRN:  [id number]    Recommendations:     Discharge Recommendations:  nursing facility, skilled   Discharge Equipment Recommendations:     Barriers to discharge: Decreased caregiver support    Assessment:     Tri Staton is a 53 y.o. female admitted with a medical diagnosis of VTE (venous thromboembolism).  She presents with the following impairments/functional limitations:  weakness, impaired self care skills, decreased lower extremity function, gait instability, impaired balance, impaired functional mobilty, decreased upper extremity function, decreased ROM, impaired coordination .    Rehab Prognosis:  **fair*; patient would benefit from acute skilled PT services to address these deficits and reach maximum level of function.      Recent Surgery: * No surgery found *      Plan:     During this hospitalization, patient to be seen 5 x/week to address the above listed problems via gait training, therapeutic activities, therapeutic exercises  · Plan of Care Expires:      Plan of Care Reviewed with: patient    Subjective     Communicated with charge nurse prior to session and PE testing negative and okd to begin PT.  Patient found supine upon PT entry to room, agreeable to evaluation.      Chief Complaint: fatigue  Patient comments/goals: improve strength  Pain/Comfort:  · Pain Rating 1: 0/10    Patients cultural, spiritual, Sikhism conflicts given the current situation:      Living Environment:  Pt lives at home with 70 year old mother.  Pt functionally IND prior to CVA in 8/2018. Since CVA pt mother drives and does grocery shopping.  Prior to admission, patients level of function was amb with RW.  Uses shower chair. Ind with dressing.  Patient has the following equipment: rollator, shower chair.  DME owned (not currently used): none.  Upon discharge, patient will have assistance from mom.    Objective:     Patient found with: telemetry, blood  pressure cuff, pulse ox (continuous)     General Precautions: Standard,     Orthopedic Precautions:    Braces:       Exams:  · RLE ROM: WFL  · RLE Strength: WFL  · LLE ROM: Deficits: limited 50%  · LLE Strength: Deficits: grossly 2/5    Functional Mobility:  · Supine to sit with Max A for LUE/LE and trunk control.  · Transfer- deferred due to safety    AM-PAC 6 CLICK MOBILITY  Total Score:8       Therapeutic Activities and Exercises:   Educated in LLE AROM and bridging to perform in bed until follow up    Patient left supine with all lines intact and call button in reach.    GOALS:   Multidisciplinary Problems     Physical Therapy Goals        Problem: Physical Therapy Goal    Goal Priority Disciplines Outcome Goal Variances Interventions   Physical Therapy Goal     PT, PT/OT Ongoing (interventions implemented as appropriate)                     History:     Past Medical History:   Diagnosis Date    Coronary artery disease     Coronary artery disease of native artery of native heart with stable angina pectoris 2018    Hypercholesteremia     Hypertension     Neuropathy     Stroke        Past Surgical History:   Procedure Laterality Date     SECTION         Clinical Decision Making:     History  Co-morbidities and personal factors that may impact the plan of care Examination  Body Structures and Functions, activity limitations and participation restrictions that may impact the plan of care Clinical Presentation   Decision Making/ Complexity Score   Co-morbidities:   [] Time since onset of injury / illness / exacerbation  [] Status of current condition  []Patient's cognitive status and safety concerns    [] Multiple Medical Problems (see med hx)  Personal Factors:   [] Patient's age  [] Prior Level of function   [] Patient's home situation (environment and family support)  [] Patient's level of motivation  [] Expected progression of patient      HISTORY:(criteria)    [] 18616 - no personal  factors/history    [] 69895 - has 1-2 personal factor/comorbidity     [] 34075 - has >3 personal factor/comorbidity     Body Regions:  [] Objective examination findings  [] Head     []  Neck  [] Trunk   [] Upper Extremity  [] Lower Extremity    Body Systems:  [] For communication ability, affect, cognition, language, and learning style: the assessment of the ability to make needs known, consciousness, orientation (person, place, and time), expected emotional /behavioral responses, and learning preferences (eg, learning barriers, education  needs)  [] For the neuromuscular system: a general assessment of gross coordinated movement (eg, balance, gait, locomotion, transfers, and transitions) and motor function  (motor control and motor learning)  [] For the musculoskeletal system: the assessment of gross symmetry, gross range of motion, gross strength, height, and weight  [] For the integumentary system: the assessment of pliability(texture), presence of scar formation, skin color, and skin integrity  [] For cardiovascular/pulmonary system: the assessment of heart rate, respiratory rate, blood pressure, and edema     Activity limitations:    [] Patient's cognitive status and saf ety concerns          [] Status of current condition      [] Weight bearing restriction  [] Cardiopulmunary Restriction    Participation Restrictions:   [] Goals and goal agreement with the patient     [] Rehab potential (prognosis) and probable outcome      Examination of Body System: (criteria)    [] 74793 - addressing 1-2 elements    [] 21910 - addressing a total of 3 or more elements     [] 96995 -  Addressing a total of 4 or more elements         Clinical Presentation: (criteria)  Choose one     On examination of body system using standardized tests and measures patient presents with (CHOOSE ONE) elements from any of the following: body structures and functions, activity limitations, and/or participation restrictions.  Leading to a  clinical presentation that is considered (CHOOSE ONE)                              Clinical Decision Making  (Eval Complexity):  Choose One     Time Tracking:     PT Received On: 11/04/18  PT Start Time: 0930     PT Stop Time: 0945  PT Total Time (min): 15 min     Billable Minutes: Evaluation 15      Edgar Alexander PT  11/04/2018

## 2018-11-04 NOTE — ED PROVIDER NOTES
SCRIBE #1 NOTE: I, Vivien Cooper, am scribing for, and in the presence of, Ginger Espinoza MD. I have scribed the entire note.         History     Chief Complaint   Patient presents with    Cough     cough, fever, non-productive, wheezes, left arm pain.       Review of patient's allergies indicates:  No Known Allergies      History of Present Illness   HPI    11/3/2018, 8:17 PM  History obtained from the patient      History of Present Illness: Tri Staton is a 53 y.o. female patient with PMHx of CAD, HTN, MI, and stroke who presents to the Emergency Department for CP which onset gradually 2 days ago. Symptoms are constant and moderate in severity. Sxs described as pressure. No mitigating factors reported. Sxs exacerbated with coughing, sneezing, and yawning. Associated sxs include fever, nonproductive cough, SOB, nausea, and wheezing. Prior tx includes 81mg aspirin. Patient also c/o constipation. Patient states her last normal BM was 2 days ago. Patient denies any diaphoresis, palpitations, extremity weakness/numbness, leg swelling, dizziness, vomiting, diarrhea, and all other sxs at this time. Patient reports MI was a month ago and stent was placed. No further complaints or concerns at this time.     Arrival mode: Personal vehicle     PCP: Primary Doctor No        Past Medical History:  Past Medical History:   Diagnosis Date    Coronary artery disease     Coronary artery disease of native artery of native heart with stable angina pectoris 2018    Hypercholesteremia     Hypertension     Neuropathy     Stroke        Past Surgical History:  Past Surgical History:   Procedure Laterality Date     SECTION           Family History:  History reviewed. No pertinent family history.    Social History:  Social History     Tobacco Use    Smoking status: Former Smoker   Substance and Sexual Activity    Alcohol use: No     Frequency: Never    Drug use: No    Sexual activity: Unknown        Review of  Systems   Review of Systems   Constitutional: Positive for fever. Negative for diaphoresis.   HENT: Negative for sore throat.    Respiratory: Positive for cough, shortness of breath and wheezing.    Cardiovascular: Positive for chest pain. Negative for palpitations and leg swelling.   Gastrointestinal: Positive for constipation and nausea. Negative for diarrhea and vomiting.   Genitourinary: Negative for dysuria.   Musculoskeletal: Negative for back pain.   Skin: Negative for rash.   Neurological: Negative for dizziness, weakness and numbness.   Hematological: Does not bruise/bleed easily.   All other systems reviewed and are negative.       Physical Exam     Initial Vitals [11/03/18 1834]   BP Pulse Resp Temp SpO2   132/74 (!) 120 18 100.2 °F (37.9 °C) (!) 94 %      MAP       --          Physical Exam  Nursing Notes and Vital Signs Reviewed.  Constitutional: Patient is in no acute distress. Well-developed and well-nourished.  Head: Atraumatic. Normocephalic.  Eyes: PERRL. EOM intact. Conjunctivae are not pale. No scleral icterus.  ENT: Mucous membranes are moist. Oropharynx is clear and symmetric.    Neck: Supple. Full ROM. No lymphadenopathy.  Cardiovascular: Tachycardic. Regular rhythm. No murmurs, rubs, or gallops. Distal pulses are 2+ and symmetric.  Pulmonary/Chest: No respiratory distress. Decreased air entry bilaterally. Poor inspiratory effort. Clear to auscultation bilaterally. No wheezing or rales.  Abdominal: Soft and non-distended.  There is no tenderness.  No rebound, guarding, or rigidity.   Musculoskeletal: Moves all extremities. No obvious deformities. No edema. No calf tenderness.  Skin: Warm and dry.  Neurological:  Alert, awake, and appropriate.  Normal speech.  No acute focal neurological deficits are appreciated.  Psychiatric: Normal affect. Good eye contact. Appropriate in content.     ED Course   Critical Care  Date/Time: 11/4/2018 1:05 AM  Performed by: Ginger Espinoza MD  Authorized by:  Ginger Espinoza MD   Direct patient critical care time: 9 minutes  Additional history critical care time: 9 minutes  Ordering / reviewing critical care time: 9 minutes  Documentation critical care time: 9 minutes  Consulting other physicians critical care time: 9 minutes  Total critical care time (exclusive of procedural time) : 45 minutes  Critical care time was exclusive of separately billable procedures and treating other patients and teaching time.  Critical care was necessary to treat or prevent imminent or life-threatening deterioration of the following conditions: circulatory failure and respiratory failure.  Critical care was time spent personally by me on the following activities: blood draw for specimens, development of treatment plan with patient or surrogate, discussions with consultants, interpretation of cardiac output measurements, evaluation of patient's response to treatment, examination of patient, obtaining history from patient or surrogate, ordering and performing treatments and interventions, ordering and review of laboratory studies, ordering and review of radiographic studies, pulse oximetry, re-evaluation of patient's condition and review of old charts.        ED Vital Signs:  Vitals:    11/04/18 0153 11/04/18 0231 11/04/18 0401 11/04/18 0501   BP:  117/66 (!) 110/58 (!) 102/56   Pulse: 104 106 97 95   Resp: 18 (!) 25 (!) 26 (!) 25   Temp:       TempSrc:       SpO2: 98% 95% 95% 95%   Weight:       Height:        11/04/18 0601 11/04/18 0701 11/04/18 0704 11/04/18 0725   BP: (!) 97/59 (!) 100/58     Pulse: 92 89 87    Resp: (!) 21 20 16    Temp:    98.3 °F (36.8 °C)   TempSrc:    Oral   SpO2: 95% 95% 99%    Weight:       Height:        11/04/18 0801 11/04/18 1002 11/04/18 1132 11/04/18 1321   BP: (!) 104/56 112/60 107/61    Pulse: 93 86 85 85   Resp: 20 20 17 20   Temp:       TempSrc:       SpO2: 96% 95% 96% 97%   Weight:       Height:        11/04/18 1412 11/04/18 1514 11/04/18 1721   BP: (!)  124/56     Pulse: 100 101 94   Resp: 18     Temp: 96.3 °F (35.7 °C)     TempSrc: Oral     SpO2: 97%     Weight:      Height:          Abnormal Lab Results:  Labs Reviewed   CBC W/ AUTO DIFFERENTIAL - Abnormal; Notable for the following components:       Result Value    RBC 3.93 (*)     Hematocrit 35.7 (*)     RDW 14.8 (*)     All other components within normal limits   COMPREHENSIVE METABOLIC PANEL - Abnormal; Notable for the following components:    CO2 19 (*)     Albumin 2.9 (*)     AST 67 (*)     ALT 50 (*)     All other components within normal limits   URINALYSIS, REFLEX TO URINE CULTURE - Abnormal; Notable for the following components:    Leukocytes, UA 3+ (*)     All other components within normal limits    Narrative:     Preferred Collection Type->Urine, Clean Catch   TROPONIN I - Abnormal; Notable for the following components:    Troponin I 0.029 (*)     All other components within normal limits   URINALYSIS MICROSCOPIC - Abnormal; Notable for the following components:    RBC, UA 5 (*)     WBC, UA 50 (*)     Bacteria, UA Few (*)     All other components within normal limits    Narrative:     Preferred Collection Type->Urine, Clean Catch   ISTAT PROCEDURE - Abnormal; Notable for the following components:    POC PH 7.493 (*)     POC PO2 26 (*)     POC SATURATED O2 55 (*)     All other components within normal limits   CULTURE, BLOOD    Narrative:     Aerobic and anaerobic   CULTURE, BLOOD    Narrative:     Aerobic and anaerobic   INFLUENZA A & B BY MOLECULAR   CULTURE, URINE   LACTIC ACID, PLASMA   MAGNESIUM   PHOSPHORUS   APTT   PROTIME-INR   B-TYPE NATRIURETIC PEPTIDE   LIPASE   PROCALCITONIN   LACTIC ACID, PLASMA   TROPONIN I   TROPONIN I   TROPONIN I        All Lab Results:  Results for orders placed or performed during the hospital encounter of 11/03/18   Blood culture x two cultures. Draw prior to antibiotics.   Result Value Ref Range    Blood Culture, Routine No Growth to date    Blood culture x two  cultures. Draw prior to antibiotics.   Result Value Ref Range    Blood Culture, Routine No Growth to date    Influenza A & B by Molecular   Result Value Ref Range    Influenza A, Molecular Negative Negative    Influenza B, Molecular Negative Negative    Flu A & B Source NP    CBC auto differential   Result Value Ref Range    WBC 7.98 3.90 - 12.70 K/uL    RBC 3.93 (L) 4.00 - 5.40 M/uL    Hemoglobin 12.2 12.0 - 16.0 g/dL    Hematocrit 35.7 (L) 37.0 - 48.5 %    MCV 91 82 - 98 fL    MCH 31.0 27.0 - 31.0 pg    MCHC 34.2 32.0 - 36.0 g/dL    RDW 14.8 (H) 11.5 - 14.5 %    Platelets 254 150 - 350 K/uL    MPV 10.0 9.2 - 12.9 fL    Gran # (ANC) 4.2 1.8 - 7.7 K/uL    Lymph # 2.6 1.0 - 4.8 K/uL    Mono # 0.8 0.3 - 1.0 K/uL    Eos # 0.4 0.0 - 0.5 K/uL    Baso # 0.03 0.00 - 0.20 K/uL    Gran% 52.1 38.0 - 73.0 %    Lymph% 32.8 18.0 - 48.0 %    Mono% 10.2 4.0 - 15.0 %    Eosinophil% 4.5 0.0 - 8.0 %    Basophil% 0.4 0.0 - 1.9 %    Differential Method Automated    Comprehensive metabolic panel   Result Value Ref Range    Sodium 137 136 - 145 mmol/L    Potassium 4.1 3.5 - 5.1 mmol/L    Chloride 106 95 - 110 mmol/L    CO2 19 (L) 23 - 29 mmol/L    Glucose 108 70 - 110 mg/dL    BUN, Bld 12 6 - 20 mg/dL    Creatinine 0.9 0.5 - 1.4 mg/dL    Calcium 9.5 8.7 - 10.5 mg/dL    Total Protein 7.9 6.0 - 8.4 g/dL    Albumin 2.9 (L) 3.5 - 5.2 g/dL    Total Bilirubin 0.8 0.1 - 1.0 mg/dL    Alkaline Phosphatase 86 55 - 135 U/L    AST 67 (H) 10 - 40 U/L    ALT 50 (H) 10 - 44 U/L    Anion Gap 12 8 - 16 mmol/L    eGFR if African American >60 >60 mL/min/1.73 m^2    eGFR if non African American >60 >60 mL/min/1.73 m^2   Lactic acid, plasma #1   Result Value Ref Range    Lactate (Lactic Acid) 1.3 0.5 - 2.2 mmol/L   Urinalysis, Reflex to Urine Culture Urine, Clean Catch   Result Value Ref Range    Specimen UA Urine, Clean Catch     Color, UA Yellow Yellow, Straw, Flaca    Appearance, UA Clear Clear    pH, UA 6.0 5.0 - 8.0    Specific Gravity, UA 1.025 1.005 -  1.030    Protein, UA Negative Negative    Glucose, UA Negative Negative    Ketones, UA Negative Negative    Bilirubin (UA) Negative Negative    Occult Blood UA Negative Negative    Nitrite, UA Negative Negative    Urobilinogen, UA Negative <2.0 EU/dL    Leukocytes, UA 3+ (A) Negative   Magnesium   Result Value Ref Range    Magnesium 1.6 1.6 - 2.6 mg/dL   Phosphorus   Result Value Ref Range    Phosphorus 4.2 2.7 - 4.5 mg/dL   APTT   Result Value Ref Range    aPTT 30.2 21.0 - 32.0 sec   Protime-INR   Result Value Ref Range    Prothrombin Time 12.1 9.0 - 12.5 sec    INR 1.2 0.8 - 1.2   Brain natriuretic peptide   Result Value Ref Range    BNP 34 0 - 99 pg/mL   Troponin I   Result Value Ref Range    Troponin I 0.029 (H) 0.000 - 0.026 ng/mL   Lipase   Result Value Ref Range    Lipase 28 4 - 60 U/L   Procalcitonin   Result Value Ref Range    Procalcitonin 0.06 <0.25 ng/mL   Lactic acid, plasma #2   Result Value Ref Range    Lactate (Lactic Acid) 1.4 0.5 - 2.2 mmol/L   Urinalysis Microscopic   Result Value Ref Range    RBC, UA 5 (H) 0 - 4 /hpf    WBC, UA 50 (H) 0 - 5 /hpf    Bacteria, UA Few (A) None-Occ /hpf    Squam Epithel, UA 3 /hpf    Hyaline Casts, UA 1 0-1/lpf /lpf    Microscopic Comment SEE COMMENT    Troponin I   Result Value Ref Range    Troponin I 0.024 0.000 - 0.026 ng/mL   Troponin I   Result Value Ref Range    Troponin I 0.006 0.000 - 0.026 ng/mL   Troponin I   Result Value Ref Range    Troponin I <0.006 0.000 - 0.026 ng/mL   Troponin I   Result Value Ref Range    Troponin I <0.006 0.000 - 0.026 ng/mL   ISTAT PROCEDURE   Result Value Ref Range    POC PH 7.493 (H) 7.35 - 7.45    POC PCO2 36.0 35 - 45 mmHg    POC PO2 26 (LL) 40 - 60 mmHg    POC HCO3 27.7 24 - 28 mmol/L    POC BE 4 -2 to 2 mmol/L    POC SATURATED O2 55 (L) 95 - 100 %    Sample VENOUS     Site Other     Allens Test N/A     DelSys Room Air     Mode SPONT     FiO2 21        Imaging Results:  Imaging Results          CTA Chest Non-Coronary - PE  Study (Final result)  Result time 11/04/18 09:27:01    Final result by Naveen Sepulveda III, MD (11/04/18 09:27:01)                 Impression:      Limited exam due to patient respiratory motion.  No large pulmonary embolus is identified.    Focal endobronchial mucoid impaction in the right lower lobe.  Stable discoid atelectasis in the left lower lobe.  Mildly improving discoid atelectasis in the right lower lobe.  Stable bilateral chronic perihilar ground-glass opacities versus motion artifact.  Stable cardiomegaly and pericardial effusion.    Stable enlarged cirrhotic appearing liver.  Stable cholelithiasis.      Electronically signed by: Naveen Sepulveda MD  Date:    11/04/2018  Time:    09:27             Narrative:    EXAMINATION:  CTA CHEST NON CORONARY    CLINICAL HISTORY:  Chest pain, acute, PE suspected, intermed prob, negative D-dimer;    TECHNIQUE:  Routine CT angiogram of the chest protocol performed after the IV administration of 100 mL Omnipaque 350. 3D reconstructions/reformats/MIPs obtained. All CT scans at this facility are performed  using dose modulation techniques as appropriate to performed exam including the following:  automated exposure control; adjustment of mA and/or kV according to the patients size (this includes techniques or standardized protocols for targeted exams where dose is matched to indication/reason for exam: i.e. extremities or head);  iterative reconstruction technique.    COMPARISON:  Chest CT September 16, 2018    FINDINGS:  Examination is limited due to patient respiratory motion artifact with artifactual hypodensities and partially obscuring the pulmonary arteries.  No large pulmonary embolus is identified.  Stable cardiomegaly, coronary artery calcifications and small to moderate pericardial effusion.  Stable small mediastinal and hilar lymph nodes without pathologic enlargement.  No aortic aneurysm or dissection is identified.  There is focal endobronchial mucoid  impaction within a subsegmental posterior right lower lobe bronchus.  Stable patchy discoid atelectasis in the left lung base.  Improving discoid atelectasis in the posterior right lower lobe.  Stable chronic bilateral perihilar ground-glass opacities versus patient motion artifact.  No new infiltrates identified.  Negative for pleural effusion or pneumothorax.  No acute osseous abnormality is seen. Limited images through the upper abdomen demonstrate no acute findings.  Stable enlarged cirrhotic appearing liver.  Stable small gallstones.                               X-Ray Chest AP Portable (Final result)  Result time 11/03/18 21:04:38    Final result by Sagar Benjamin III, MD (11/03/18 21:04:38)                 Impression:      Negative one view chest x-ray.      Electronically signed by: Sagar Benjamin MD  Date:    11/03/2018  Time:    21:04             Narrative:    EXAMINATION:  XR CHEST AP PORTABLE    CLINICAL HISTORY:  Sepsis;    FINDINGS:  Heart size is normal. The lung fields are clear. No acute pulmonary infiltrate.                               1:30 AM: Per [facility] (Dr. Coleman), pt's CTA chest results: There is significant artifact limiting evaluation. Do no feel this exam is sufficient to exclude pulmonary embolism. No large saddle embolus. Cardiomegaly. Moderate size pericardial effusion, similar to the previous. Suspected atelectasis and debris within some airways. Cholelithiasis and other findings.    1:36 AM (documented during daylight savings time): Per [facility] (Dr. Santoro), pt's CTA chest results:   1. No definite evidence of pulmonary embolus. However, findings are limited secondary to motion artifact. Consider repeat imaging.  2. Mild mucus impaction seen within the right lower lobe bronchi.    The EKG was ordered, reviewed, and independently interpreted by the ED provider.  Interpretation time: 1902  Rate: 113 BPM  Rhythm: sinus tachycardia  Interpretation: Possible left  atrial enlargement. Left axis deviation. RBBB. Anterolateral infarct, age undetermined. No STEMI.    The EKG was ordered, reviewed, and independently interpreted by the ED provider.  Interpretation time: 2341  Rate: 103 BPM  Rhythm: sinus tachycardia  Interpretation: RBBB. Anterolateral infarct, age undetermined. No STEMI.          The Emergency Provider reviewed the vital signs and test results, which are outlined above.     ED Discussion     12:07 AM: Re-evaluated pt. Pt is resting comfortably and is in no acute distress.  D/w pt all pertinent results. D/w pt any concerns expressed at this time. Answered all questions. Pt expresses understanding at this time. Discussed with patient awaiting CTA and repeat troponin. Will order steroids and breathing tx.    1:58 AM: Re-evaluated pt. Patient reports being diagnosed with a month ago with DVT of LLE and was started on Eliquis. D/w pt and family all pertinent results. D/w pt and family any concerns expressed at this time. Answered all questions. Pt and family express understanding at this time.    1:42 AM: Dr. Espinoza discussed the pt's case with Dr. Coleman ([facility]) to discuss CT results. Discussed concern for PE and if CT can be reviewed again. Dr. Coleman states he still feels there is significant artifact and will not change his read.    1:55 AM: Discussed case with Dr. Fink ([facility]) who agrees with current care and management of pt and accepts admission.   Admitting Service: Hospital medicine   Admitting Physician: Dr. Fink  Admit to: Inpatient-tele    1:07 AM: Re-evaluated pt. I have discussed test results, shared treatment plan, and the need for admission with patient and family at bedside. Pt and family express understanding at this time and agree with all information. All questions answered. Pt and family have no further questions or concerns at this time. Pt is ready for admit.    ED Medication(s):  Medications   enoxaparin injection 100 mg (100 mg  Subcutaneous Given 11/4/18 1428)   cefTRIAXone (ROCEPHIN) 1 g in dextrose 5 % 50 mL IVPB (0 g Intravenous Stopped 11/4/18 0315)   azithromycin 500 mg in dextrose 5 % 250 mL IVPB (ready to mix system) (0 mg Intravenous Stopped 11/4/18 0345)   ondansetron injection 4 mg (not administered)   acetaminophen tablet 650 mg (not administered)   aluminum-magnesium hydroxide-simethicone 200-200-20 mg/5 mL suspension 30 mL (not administered)   guaifenesin 100 mg/5 ml syrup 200 mg (not administered)   albuterol-ipratropium 2.5 mg-0.5 mg/3 mL nebulizer solution 3 mL (3 mLs Nebulization Given 11/4/18 1321)   aspirin EC tablet 81 mg (81 mg Oral Given 11/4/18 0817)   atorvastatin tablet 80 mg (80 mg Oral Given 11/4/18 0816)   gabapentin capsule 600 mg (600 mg Oral Given 11/4/18 1429)   hydrALAZINE tablet 25 mg (25 mg Oral Given 11/4/18 1428)   isosorbide mononitrate 24 hr tablet 60 mg (0 mg Oral Hold 11/4/18 0900)   metoprolol succinate (TOPROL-XL) 24 hr tablet 25 mg (0 mg Oral Hold 11/4/18 0900)   lisinopril tablet 40 mg (0 mg Oral Hold 11/4/18 0900)   oxybutynin tablet 5 mg (5 mg Oral Given 11/4/18 0817)   sodium chloride 0.9% bolus 2,901 mL (0 mL/kg × 96.7 kg (Dosing Weight) Intravenous Stopped 11/3/18 2301)   aspirin tablet 325 mg (325 mg Oral Given 11/3/18 2331)   gabapentin capsule 600 mg (600 mg Oral Given 11/3/18 2355)   methylPREDNISolone sodium succinate injection 125 mg (125 mg Intravenous Given 11/3/18 2357)   albuterol-ipratropium 2.5 mg-0.5 mg/3 mL nebulizer solution 3 mL (3 mLs Nebulization Given 11/4/18 0007)   omnipaque 350 iohexol 100 mL (100 mLs Intravenous Given 11/4/18 0032)                Medical Decision Making     Medical Decision Making:   Clinical Tests:   Lab Tests: Ordered and Reviewed  Radiological Study: Ordered and Reviewed  Medical Tests: Ordered and Reviewed             Scribe Attestation:   Scribe #1: I performed the above scribed service and the documentation accurately describes the services I  performed. I attest to the accuracy of the note.     Attending:   Physician Attestation Statement for Scribe #1: I, Ginger Espinoza MD, personally performed the services described in this documentation, as scribed by Vivien Cooper, in my presence, and it is both accurate and complete.           Clinical Impression       ICD-10-CM ICD-9-CM   1. Chest pain, unspecified type R07.9 786.50   2. Tachycardia R00.0 785.0   3. SIRS (systemic inflammatory response syndrome) R65.10 995.90   4. Chest pain R07.9 786.50   5. SOB (shortness of breath) R06.02 786.05       Disposition:   Disposition: Admitted (Inpatient-tele)  Condition: Fair         Ginger Espinoza MD  11/04/18 6981

## 2018-11-04 NOTE — PLAN OF CARE
Sw met with pt and family in ED at bedside to complete assessment. Sw explained role/purpose of visit. Transitional navigator was provided to pt. Pt reports before admission, she required assistance with ADLs. Pt reports her family is working on setting up in home services for her. Pt reports she uses   [id number] - [place] - [address] AT [facility] [address]  [address]  [place] [address]  Phone: [phone] Fax: [phone]    Case Management will f/u with any dc needs.        11/04/18 1254   Discharge Assessment   Assessment Type Discharge Planning Assessment   Confirmed/corrected address and phone number on facesheet? Yes   Assessment information obtained from? Patient;Caregiver   Prior to hospitilization cognitive status: Alert/Oriented   Prior to hospitalization functional status: Needs Assistance;Assistive Equipment   Current cognitive status: Alert/Oriented   Current Functional Status: Assistive Equipment;Needs Assistance   Facility Arrived From: Home   Lives With parent(s)  (lives with mother)   Able to Return to Prior Arrangements yes   Is patient able to care for self after discharge? Unable to determine at this time (comments)   Who are your caregiver(s) and their phone number(s)? Stephie Rodriguez, mother,  [phone]   Patient's perception of discharge disposition home or selfcare   Readmission Within The Last 30 Days no previous admission in last 30 days   Patient currently being followed by outpatient case management? No   Patient currently receives any other outside agency services? No   Equipment Currently Used at Home walker, rolling;shower chair   Do you have any problems affording any of your prescribed medications? TBD   Is the patient taking medications as prescribed? yes   Does the patient have transportation home? Yes   Transportation Available family or friend will provide   Does the patient receive services at the [facility]? No    Discharge Plan A Home with family   Discharge Plan B Home with family   Patient/Family In Agreement With Plan yes

## 2018-11-04 NOTE — PLAN OF CARE
Problem: Physical Therapy Goal  Goal: Physical Therapy Goal  Outcome: Ongoing (interventions implemented as appropriate)  PT eval complete.  The following goals should be met in 7 days  1. Pt with be SBA with bed mobility  2.  Pt will transfer bed to chair with RW with SBA  3.  Pt will ambulate 35 feet with RW with Mod A

## 2018-11-04 NOTE — H&P
[facility] Hospital Medicine  History & Physical    Patient Name: Tri Staton  MRN: [id number]  Admission Date: 11/3/2018  Attending Physician: Aman Fink MD  Primary Care Provider: Primary Doctor No         Patient information was obtained from patient, parent, relative(s), past medical records and ER records.     Subjective:     Principal Problem:VTE (venous thromboembolism)    Chief Complaint:   Chief Complaint   Patient presents with    Cough     cough, fever, non-productive, wheezes, left arm pain.        HPI: Ms. Staton araceli  a 53-year-old  female with a history of CVA, with residual left-sided deficits, recently suffered an MI one month ago, had a stent placed, and at around the same time was diagnosed with left lower extremity DVT.  Patient has been on aspirin, apixaban, BB, ACEI and statin.  Patient reports compliance with medications..  Today she presented to the ED complaining of low-grade fever of 100.3 at home associated with increasing confusion per family members.  Patient also complains of nonproductive cough, worsening shortness of breath.  WBC and lactic acid are within normal limits.  CTA chest was done that could not exclude pulmonary embolism.  In spite of being on apixaban, and inability to exclude PE, patient was administered Lovenox 1 milligram/kilogram subcu x1 in the ED.  Blood cultures have been obtained.  Patient started on IV Rocephin and Zithromax empirically.  Discussed with multiple family members at the bedside, including patient's mother.    Past Medical History:   Diagnosis Date    Coronary artery disease     Coronary artery disease of native artery of native heart with stable angina pectoris 2018    Hypercholesteremia     Hypertension     Neuropathy     Stroke        Past Surgical History:   Procedure Laterality Date     SECTION         Review of patient's allergies indicates:  No Known Allergies    No current  facility-administered medications on file prior to encounter.      Current Outpatient Medications on File Prior to Encounter   Medication Sig    amitriptyline (ELAVIL) 50 MG tablet Take 50 mg by mouth every evening.    apixaban (ELIQUIS) 5 mg Tab Take 5 mg by mouth 2 (two) times daily.    aspirin (ECOTRIN) 81 MG EC tablet Take 81 mg by mouth once daily.    atorvastatin (LIPITOR) 80 MG tablet Take 80 mg by mouth once daily.    docusate sodium (COLACE) 100 MG capsule Take 1 capsule (100 mg total) by mouth 2 (two) times daily as needed for Constipation.    gabapentin (NEURONTIN) 600 MG tablet Take 600 mg by mouth 3 (three) times daily.    hydrALAZINE (APRESOLINE) 25 MG tablet Take 1 tablet (25 mg total) by mouth 3 (three) times daily.    isosorbide mononitrate (IMDUR) 60 MG 24 hr tablet Take 1 tablet (60 mg total) by mouth 2 (two) times daily.    lisinopril (PRINIVIL,ZESTRIL) 40 MG tablet Take 40 mg by mouth once daily.    metoprolol succinate (TOPROL-XL) 25 MG 24 hr tablet Take 1 tablet (25 mg total) by mouth once daily. (Patient taking differently: Take 25 mg by mouth once daily. )    NIFEdipine (ADALAT CC) 30 MG TbSR Take 30 mg by mouth once daily.    oxybutynin (DITROPAN) 5 MG Tab Take 5 mg by mouth 2 (two) times daily.    hydrocortisone 2.5 % cream Apply topically 2 (two) times daily. for 7 days    nitroGLYCERIN (NITROSTAT) 0.4 MG SL tablet Place 1 tablet (0.4 mg total) under the tongue every 5 (five) minutes as needed for Chest pain.     Family History     Problem Relation (Age of Onset)    Hypertension Mother        Tobacco Use    Smoking status: Former Smoker    Smokeless tobacco: Never Used   Substance and Sexual Activity    Alcohol use: No     Frequency: Never    Drug use: No    Sexual activity: Not on file     Review of Systems   Constitutional: Positive for appetite change, fatigue and fever. Negative for chills and diaphoresis.   HENT: Negative.  Negative for congestion, nosebleeds,  rhinorrhea and sinus pressure.    Eyes: Negative.  Negative for photophobia and visual disturbance.   Respiratory: Positive for cough and shortness of breath. Negative for chest tightness and wheezing.    Cardiovascular: Positive for leg swelling (left). Negative for chest pain and palpitations.   Gastrointestinal: Negative.  Negative for abdominal pain, diarrhea, nausea and vomiting.   Endocrine: Negative.  Negative for polyphagia and polyuria.   Genitourinary: Negative.  Negative for dysuria, flank pain, frequency and urgency.   Musculoskeletal: Negative.  Negative for back pain, joint swelling and neck stiffness.   Skin: Negative.  Negative for color change, pallor and rash.   Allergic/Immunologic: Negative.  Negative for immunocompromised state.   Neurological: Negative.  Negative for dizziness, syncope, speech difficulty, numbness and headaches.   Hematological: Negative.  Negative for adenopathy. Does not bruise/bleed easily.   Psychiatric/Behavioral: Positive for confusion and decreased concentration. Negative for hallucinations. The patient is not nervous/anxious.    All other systems reviewed and are negative.    Objective:     Vital Signs (Most Recent):  Temp: 98.6 °F (37 °C) (11/04/18 0051)  Pulse: 109 (11/04/18 0115)  Resp: 20 (11/04/18 0115)  BP: 119/78 (11/04/18 0115)  SpO2: 97 % (11/04/18 0115) Vital Signs (24h Range):  Temp:  [98.6 °F (37 °C)-100.2 °F (37.9 °C)] 98.6 °F (37 °C)  Pulse:  [101-120] 109  Resp:  [18-27] 20  SpO2:  [94 %-98 %] 97 %  BP: (119-136)/(72-80) 119/78     Weight: 96.7 kg (213 lb 3 oz)  Body mass index is 37.76 kg/m².    Physical Exam   Constitutional: She is oriented to person, place, and time. She appears well-developed and well-nourished. No distress.   Ill-appearing  female, in no respiratory distress. Patient's mother and other family member at the bedside.   HENT:   Head: Normocephalic and atraumatic.   Eyes: Conjunctivae and EOM are normal. No scleral  icterus.   Neck: Normal range of motion. Neck supple. No tracheal deviation present. No thyromegaly present.   Cardiovascular: Regular rhythm, normal heart sounds and intact distal pulses. Tachycardia present.   No murmur heard.  Pulmonary/Chest: Effort normal. No respiratory distress. She has no wheezes. She has rhonchi. She has no rales. She exhibits no tenderness.   Abdominal: Soft. Bowel sounds are normal. She exhibits no distension. There is no tenderness.   Musculoskeletal: Normal range of motion. She exhibits no edema or tenderness.   Lymphadenopathy:     She has no cervical adenopathy.   Neurological: She is alert and oriented to person, place, and time. No cranial nerve deficit. She exhibits normal muscle tone. Coordination normal.   Skin: Skin is warm and dry. She is not diaphoretic. No erythema.   Psychiatric: She has a normal mood and affect. Her behavior is normal.   Nursing note and vitals reviewed.        CRANIAL NERVES     CN III, IV, VI   Extraocular motions are normal.        Significant Labs:   Bilirubin:   Recent Labs   Lab 11/03/18 2133   BILITOT 0.8     Blood Culture: No results for input(s): LABBLOO in the last 48 hours.  BMP:   Recent Labs   Lab 11/03/18 2133         K 4.1      CO2 19*   BUN 12   CREATININE 0.9   CALCIUM 9.5   MG 1.6     CBC:   Recent Labs   Lab 11/03/18 2133   WBC 7.98   HGB 12.2   HCT 35.7*        CMP:   Recent Labs   Lab 11/03/18 2133      K 4.1      CO2 19*      BUN 12   CREATININE 0.9   CALCIUM 9.5   PROT 7.9   ALBUMIN 2.9*   BILITOT 0.8   ALKPHOS 86   AST 67*   ALT 50*   ANIONGAP 12   EGFRNONAA >60     Cardiac Markers:   Recent Labs   Lab 11/03/18 2133   BNP 34     Coagulation:   Recent Labs   Lab 11/03/18 2133   INR 1.2   APTT 30.2     Lactic Acid:   Recent Labs   Lab 11/03/18 2133 11/04/18  0045   LACTATE 1.3 1.4     Lipase:   Recent Labs   Lab 11/03/18 2133   LIPASE 28     Troponin:   Recent Labs   Lab  11/03/18 2133 11/04/18  0045   TROPONINI 0.029* 0.024     Urine Studies:   Recent Labs   Lab 11/03/18 2237   COLORU Yellow   APPEARANCEUA Clear   PHUR 6.0   SPECGRAV 1.025   PROTEINUA Negative   GLUCUA Negative   KETONESU Negative   BILIRUBINUA Negative   OCCULTUA Negative   NITRITE Negative   UROBILINOGEN Negative   LEUKOCYTESUR 3+*   RBCUA 5*   WBCUA 50*   BACTERIA Few*   SQUAMEPITHEL 3   HYALINECASTS 1     All pertinent labs within the past 24 hours have been reviewed.    Significant Imaging: I have reviewed and interpreted all pertinent imaging results/findings within the past 24 hours.     Imaging Results          CTA Chest Non-Coronary - PE Study (In process)                X-Ray Chest AP Portable (Final result)  Result time 11/03/18 21:04:38    Final result by Sagar Benjamin III, MD (11/03/18 21:04:38)                 Impression:      Negative one view chest x-ray.      Electronically signed by: Sagar Benjamin MD  Date:    11/03/2018  Time:    21:04             Narrative:    EXAMINATION:  XR CHEST AP PORTABLE    CLINICAL HISTORY:  Sepsis;    FINDINGS:  Heart size is normal. The lung fields are clear. No acute pulmonary infiltrate.                                I have independently reviewed and interpreted the EKG.     I have independently reviewed all pertinent labs within the past 24 hours.    I have independently reviewed, visualized and interpreted all pertinent imaging results within the past 24 hours and discussed the findings with the ED physician, Dr. Espinoza.            Assessment/Plan:     * VTE (venous thromboembolism)    Recent history of left lower extremity DVT.  CTA today unable to exclude PE.  Patient has been on apixaban at home, will hold.  Low ox 1 mg subcu b.i.d. for now.       Acute bronchitis    Low-grade fever of 100.2, nonproductive cough.  Chest x-ray does not show any evidence of infiltrates.  IV Rocephin and Zithromax empirically.  Bronchodilators every 6 hr.       Coronary  artery disease of native artery of native heart with stable angina pectoris    Recent history of CAD per family.  Continue aspirin, Lovenox, statin, beta-blocker, ACE-inhibitor.  Consider Cardiology consult.  Troponin 0.029, 0.024.       CVA (cerebral vascular accident)    Recent history of a CVA with left-sided neurological deficits.  P.T./OT evaluation.  Aspirin daily.  Statin, Ace inhibitor daily.       Essential hypertension    Continue current home medications.  Will monitor and make adjustments as needed.         VTE Risk Mitigation (From admission, onward)        Ordered     enoxaparin injection 100 mg  Every 12 hours (non-standard times)      11/04/18 0159     Place sequential compression device  Until discontinued      11/04/18 0133             Aman Fink MD  Department of Hospital Medicine   [facility]

## 2018-11-04 NOTE — ASSESSMENT & PLAN NOTE
Recent history of CAD per family.  Continue aspirin, Lovenox, statin, beta-blocker, ACE-inhibitor.  Consider Cardiology consult.  Troponin 0.029, 0.024.

## 2018-11-05 VITALS
TEMPERATURE: 98 F | OXYGEN SATURATION: 96 % | HEART RATE: 96 BPM | HEIGHT: 63 IN | SYSTOLIC BLOOD PRESSURE: 119 MMHG | DIASTOLIC BLOOD PRESSURE: 64 MMHG | RESPIRATION RATE: 17 BRPM | WEIGHT: 213.19 LBS | BODY MASS INDEX: 37.77 KG/M2

## 2018-11-05 PROBLEM — J98.11 DISCOID ATELECTASIS: Status: ACTIVE | Noted: 2018-11-05

## 2018-11-05 PROBLEM — T17.500A MUCOID IMPACTION OF BRONCHI: Status: ACTIVE | Noted: 2018-11-05

## 2018-11-05 PROBLEM — R29.818 SUSPECTED SLEEP APNEA: Status: ACTIVE | Noted: 2018-11-05

## 2018-11-05 LAB
ALBUMIN SERPL BCP-MCNC: 2.5 G/DL
ALP SERPL-CCNC: 73 U/L
ALT SERPL W/O P-5'-P-CCNC: 44 U/L
ANION GAP SERPL CALC-SCNC: 10 MMOL/L
AST SERPL-CCNC: 67 U/L
BACTERIA UR CULT: NORMAL
BASOPHILS # BLD AUTO: 0.01 K/UL
BASOPHILS NFR BLD: 0.1 %
BILIRUB SERPL-MCNC: 0.3 MG/DL
BRPFT: ABNORMAL
BUN SERPL-MCNC: 20 MG/DL
CALCIUM SERPL-MCNC: 8.4 MG/DL
CHLORIDE SERPL-SCNC: 111 MMOL/L
CO2 SERPL-SCNC: 16 MMOL/L
CREAT SERPL-MCNC: 0.9 MG/DL
DIFFERENTIAL METHOD: ABNORMAL
EOSINOPHIL # BLD AUTO: 0 K/UL
EOSINOPHIL NFR BLD: 0.1 %
ERYTHROCYTE [DISTWIDTH] IN BLOOD BY AUTOMATED COUNT: 15 %
EST. GFR  (AFRICAN AMERICAN): >60 ML/MIN/1.73 M^2
EST. GFR  (NON AFRICAN AMERICAN): >60 ML/MIN/1.73 M^2
ESTIMATED PA SYSTOLIC PRESSURE: 32.16
FEV1 FVC LLN: 53
FEV1 FVC PRE REF: 111.7 %
FEV1 FVC REF: 85
FEV1 LLN: 1.72
FEV1 PRE REF: 62.3 %
FEV1 REF: 2.35
FVC LLN: 2.06
FVC PRE REF: 55.8 %
FVC REF: 2.77
GLOBAL PERICARDIAL EFFUSION: ABNORMAL
GLUCOSE SERPL-MCNC: 200 MG/DL
HCT VFR BLD AUTO: 33.1 %
HGB BLD-MCNC: 10.7 G/DL
LYMPHOCYTES # BLD AUTO: 2.1 K/UL
LYMPHOCYTES NFR BLD: 15.9 %
MAGNESIUM SERPL-MCNC: 1.7 MG/DL
MCH RBC QN AUTO: 29.4 PG
MCHC RBC AUTO-ENTMCNC: 32.3 G/DL
MCV RBC AUTO: 91 FL
MITRAL VALVE MOBILITY: NORMAL
MONOCYTES # BLD AUTO: 0.7 K/UL
MONOCYTES NFR BLD: 5.4 %
MVV LLN: 80
MVV REF: 94
NEUTROPHILS # BLD AUTO: 10.1 K/UL
NEUTROPHILS NFR BLD: 78.7 %
PHOSPHATE SERPL-MCNC: 2.8 MG/DL
PLATELET # BLD AUTO: 251 K/UL
PMV BLD AUTO: 9.9 FL
POTASSIUM SERPL-SCNC: 4.1 MMOL/L
PRE FEF 25 75: 1.88 L/S
PRE FET 100: 1.28 SEC
PRE FEV1 FVC: 94.62 % (ref 53.45–115.99)
PRE FEV1: 1.46 L (ref 1.72–2.97)
PRE FVC: 1.54 L (ref 2.06–3.48)
PRE PEF: 3.7 L/S
PROT SERPL-MCNC: 6.8 G/DL
RBC # BLD AUTO: 3.64 M/UL
RETIRED EF AND QEF - SEE NOTES: 60 (ref 55–65)
SODIUM SERPL-SCNC: 137 MMOL/L
TRICUSPID VALVE REGURGITATION: ABNORMAL
WBC # BLD AUTO: 12.92 K/UL

## 2018-11-05 PROCEDURE — 25000242 PHARM REV CODE 250 ALT 637 W/ HCPCS: Performed by: INTERNAL MEDICINE

## 2018-11-05 PROCEDURE — 97116 GAIT TRAINING THERAPY: CPT

## 2018-11-05 PROCEDURE — 99900035 HC TECH TIME PER 15 MIN (STAT)

## 2018-11-05 PROCEDURE — 36415 COLL VENOUS BLD VENIPUNCTURE: CPT

## 2018-11-05 PROCEDURE — 25000003 PHARM REV CODE 250: Performed by: INTERNAL MEDICINE

## 2018-11-05 PROCEDURE — 83735 ASSAY OF MAGNESIUM: CPT

## 2018-11-05 PROCEDURE — 63600175 PHARM REV CODE 636 W HCPCS: Performed by: INTERNAL MEDICINE

## 2018-11-05 PROCEDURE — 27000646 HC AEROBIKA DEVICE

## 2018-11-05 PROCEDURE — 94010 BREATHING CAPACITY TEST: CPT

## 2018-11-05 PROCEDURE — 97530 THERAPEUTIC ACTIVITIES: CPT

## 2018-11-05 PROCEDURE — 99233 SBSQ HOSP IP/OBS HIGH 50: CPT | Mod: ,,, | Performed by: INTERNAL MEDICINE

## 2018-11-05 PROCEDURE — 84100 ASSAY OF PHOSPHORUS: CPT

## 2018-11-05 PROCEDURE — 94761 N-INVAS EAR/PLS OXIMETRY MLT: CPT

## 2018-11-05 PROCEDURE — 63600175 PHARM REV CODE 636 W HCPCS: Performed by: FAMILY MEDICINE

## 2018-11-05 PROCEDURE — 80053 COMPREHEN METABOLIC PANEL: CPT

## 2018-11-05 PROCEDURE — 25000003 PHARM REV CODE 250: Performed by: FAMILY MEDICINE

## 2018-11-05 PROCEDURE — 94799 UNLISTED PULMONARY SVC/PX: CPT

## 2018-11-05 PROCEDURE — 94640 AIRWAY INHALATION TREATMENT: CPT | Mod: 59

## 2018-11-05 PROCEDURE — 99232 SBSQ HOSP IP/OBS MODERATE 35: CPT | Mod: ,,, | Performed by: INTERNAL MEDICINE

## 2018-11-05 PROCEDURE — 85025 COMPLETE CBC W/AUTO DIFF WBC: CPT

## 2018-11-05 PROCEDURE — 94664 DEMO&/EVAL PT USE INHALER: CPT

## 2018-11-05 PROCEDURE — G0378 HOSPITAL OBSERVATION PER HR: HCPCS

## 2018-11-05 RX ORDER — MOXIFLOXACIN HYDROCHLORIDE 400 MG/1
400 TABLET ORAL DAILY
Status: COMPLETED | OUTPATIENT
Start: 2018-11-05 | End: 2018-11-05

## 2018-11-05 RX ORDER — LEVOFLOXACIN 750 MG/1
750 TABLET ORAL DAILY
Qty: 5 TABLET | Refills: 0 | Status: ON HOLD | OUTPATIENT
Start: 2018-11-05 | End: 2018-11-27 | Stop reason: HOSPADM

## 2018-11-05 RX ADMIN — HYDRALAZINE HYDROCHLORIDE 25 MG: 25 TABLET, FILM COATED ORAL at 03:11

## 2018-11-05 RX ADMIN — ENOXAPARIN SODIUM 100 MG: 100 INJECTION SUBCUTANEOUS at 02:11

## 2018-11-05 RX ADMIN — IPRATROPIUM BROMIDE AND ALBUTEROL SULFATE 3 ML: .5; 3 SOLUTION RESPIRATORY (INHALATION) at 11:11

## 2018-11-05 RX ADMIN — ISOSORBIDE MONONITRATE 60 MG: 60 TABLET, EXTENDED RELEASE ORAL at 09:11

## 2018-11-05 RX ADMIN — MOXIFLOXACIN HYDROCHLORIDE 400 MG: 400 TABLET, FILM COATED ORAL at 06:11

## 2018-11-05 RX ADMIN — LISINOPRIL 40 MG: 20 TABLET ORAL at 09:11

## 2018-11-05 RX ADMIN — ASPIRIN 81 MG: 81 TABLET, COATED ORAL at 09:11

## 2018-11-05 RX ADMIN — OXYBUTYNIN CHLORIDE 5 MG: 5 TABLET ORAL at 09:11

## 2018-11-05 RX ADMIN — IPRATROPIUM BROMIDE AND ALBUTEROL SULFATE 3 ML: .5; 3 SOLUTION RESPIRATORY (INHALATION) at 07:11

## 2018-11-05 RX ADMIN — METOPROLOL SUCCINATE 25 MG: 25 TABLET, EXTENDED RELEASE ORAL at 09:11

## 2018-11-05 RX ADMIN — AZITHROMYCIN MONOHYDRATE 500 MG: 500 INJECTION, POWDER, LYOPHILIZED, FOR SOLUTION INTRAVENOUS at 02:11

## 2018-11-05 RX ADMIN — GABAPENTIN 600 MG: 300 CAPSULE ORAL at 03:11

## 2018-11-05 RX ADMIN — IPRATROPIUM BROMIDE AND ALBUTEROL SULFATE 3 ML: .5; 3 SOLUTION RESPIRATORY (INHALATION) at 12:11

## 2018-11-05 RX ADMIN — ENOXAPARIN SODIUM 100 MG: 100 INJECTION SUBCUTANEOUS at 03:11

## 2018-11-05 RX ADMIN — HYDRALAZINE HYDROCHLORIDE 25 MG: 25 TABLET, FILM COATED ORAL at 09:11

## 2018-11-05 RX ADMIN — ATORVASTATIN CALCIUM 80 MG: 40 TABLET, FILM COATED ORAL at 09:11

## 2018-11-05 RX ADMIN — CEFTRIAXONE 1 G: 1 INJECTION, SOLUTION INTRAVENOUS at 02:11

## 2018-11-05 RX ADMIN — GABAPENTIN 600 MG: 300 CAPSULE ORAL at 09:11

## 2018-11-05 NOTE — ASSESSMENT & PLAN NOTE
-Stable, s/p recent MI with LHC and known multivessel CAD  -Complains of pleuritic chest discomfort  -Troponin flat  -EKG without acute ischemic changes  -2D echo pending  -Continue home meds-ASA, statin, BB, Imdur, hydralazine  -Will consult CVT for further rec's since patient is admitted

## 2018-11-05 NOTE — CONSULTS
[facility]  Cardiology  Consult Note    Patient Name: Tri Staton  MRN: [id number]  Admission Date: 11/3/2018  Hospital Length of Stay: 1 days  Code Status: Full Code   Attending Provider: Cori Walker MD   Consulting Provider: Abena Cuellar PA-C  Primary Care Physician: Primary Doctor No  Principal Problem:VTE (venous thromboembolism)    Patient information was obtained from patient, past medical records and ER records.     Inpatient consult to Cardiology  Consult performed by: Abena Cuellar PA-C  Consult ordered by: Lucas Rincon MD        Subjective:     Chief Complaint:  Cough, chest pain    HPI:   Ms. Staton is a 53 year old female patient with a PMHx of CVA in 9/18, DVT, PE, HTN, recent MI, and hypercholesterolemia who presented to [facility] ED yesterday with a chief complaint of chest pain over x 2 days. Associated symptoms included subjective fever, non-productive cough, SOB, nausea, and wheezing. Patient denied any radiation of pain or any associated vomiting, diaphoresis, palpitations, near syncope, or syncope. Initial workup in ED revealed troponin of 0.029 and elevated liver enzymes. Patient subsequently admitted for further evaluation and treatment. Cardiology consulted to assist with management. Patient seen and examined today, resting in bed. Still complains of pleuritic chest discomfort, worse with sneezing, coughing, or yawning but states it has improved since admission. Denies any other cardiac complaints. Patient is compliant with her medications. She is followed on an OP basis by Dr. Weldon. Review of records shows she suffered an MI in [place] and had LHC which showed multivessel CAD, CABG recommended. Of note, she was scheduled to see Dr. Steele on 11/7/18 for evaluation. Chart reviewed. Troponin flat. EKG without acute ischemic changes. 2D echo pending.      Past Medical History:   Diagnosis Date    Coronary artery disease     Coronary artery  disease of native artery of native heart with stable angina pectoris 2018    Hypercholesteremia     Hypertension     Neuropathy     Stroke        Past Surgical History:   Procedure Laterality Date     SECTION         Review of patient's allergies indicates:  No Known Allergies    No current facility-administered medications on file prior to encounter.      Current Outpatient Medications on File Prior to Encounter   Medication Sig    amitriptyline (ELAVIL) 50 MG tablet Take 50 mg by mouth every evening.    apixaban (ELIQUIS) 5 mg Tab Take 5 mg by mouth 2 (two) times daily.    aspirin (ECOTRIN) 81 MG EC tablet Take 81 mg by mouth once daily.    atorvastatin (LIPITOR) 80 MG tablet Take 80 mg by mouth once daily.    docusate sodium (COLACE) 100 MG capsule Take 1 capsule (100 mg total) by mouth 2 (two) times daily as needed for Constipation.    gabapentin (NEURONTIN) 600 MG tablet Take 600 mg by mouth 3 (three) times daily.    hydrALAZINE (APRESOLINE) 25 MG tablet Take 1 tablet (25 mg total) by mouth 3 (three) times daily.    isosorbide mononitrate (IMDUR) 60 MG 24 hr tablet Take 1 tablet (60 mg total) by mouth 2 (two) times daily.    lisinopril (PRINIVIL,ZESTRIL) 40 MG tablet Take 40 mg by mouth once daily.    metoprolol succinate (TOPROL-XL) 25 MG 24 hr tablet Take 1 tablet (25 mg total) by mouth once daily. (Patient taking differently: Take 25 mg by mouth once daily. )    NIFEdipine (ADALAT CC) 30 MG TbSR Take 30 mg by mouth once daily.    oxybutynin (DITROPAN) 5 MG Tab Take 5 mg by mouth 2 (two) times daily.    hydrocortisone 2.5 % cream Apply topically 2 (two) times daily. for 7 days    nitroGLYCERIN (NITROSTAT) 0.4 MG SL tablet Place 1 tablet (0.4 mg total) under the tongue every 5 (five) minutes as needed for Chest pain.     Family History     Problem Relation (Age of Onset)    Hypertension Mother        Tobacco Use    Smoking status: Former Smoker    Smokeless tobacco: Never Used    Substance and Sexual Activity    Alcohol use: No     Frequency: Never    Drug use: No    Sexual activity: Not on file     Review of Systems   Constitution: Positive for fever, weakness and malaise/fatigue.   HENT: Negative.    Eyes: Negative.    Cardiovascular: Positive for dyspnea on exertion.   Respiratory: Positive for cough, shortness of breath and wheezing.    Endocrine: Negative.    Hematologic/Lymphatic: Negative.    Skin: Negative.    Musculoskeletal: Negative.    Gastrointestinal: Positive for nausea.   Genitourinary: Negative.    Psychiatric/Behavioral: Negative.    Allergic/Immunologic: Negative.      Objective:     Vital Signs (Most Recent):  Temp: 96.7 °F (35.9 °C) (11/05/18 1157)  Pulse: 100 (11/05/18 1157)  Resp: 17 (11/05/18 1157)  BP: (!) 115/58 (11/05/18 1157)  SpO2: 99 % (11/05/18 1157) Vital Signs (24h Range):  Temp:  [96.3 °F (35.7 °C)-98.2 °F (36.8 °C)] 96.7 °F (35.9 °C)  Pulse:  [] 100  Resp:  [17-22] 17  SpO2:  [92 %-99 %] 99 %  BP: (114-124)/(56-67) 115/58     Weight: 96.7 kg (213 lb 3 oz)  Body mass index is 37.76 kg/m².    SpO2: 99 %  O2 Device (Oxygen Therapy): room air      Intake/Output Summary (Last 24 hours) at 11/5/2018 1240  Last data filed at 11/4/2018 1300  Gross per 24 hour   Intake --   Output 101 ml   Net -101 ml       Lines/Drains/Airways     Peripheral Intravenous Line                 Peripheral IV - Single Lumen 11/03/18 2115 Left Hand 1 day         Peripheral IV - Single Lumen 11/03/18 2130 Left Forearm 1 day                Physical Exam   Constitutional: She is oriented to person, place, and time. She appears well-developed and well-nourished. No distress.   HENT:   Head: Normocephalic and atraumatic.   Eyes: Pupils are equal, round, and reactive to light. Right eye exhibits no discharge. Left eye exhibits no discharge.   Neck: Neck supple. No JVD present. No thyromegaly present.   Cardiovascular: Normal rate, regular rhythm, S1 normal, S2 normal and normal  heart sounds.   No murmur heard.  Pulmonary/Chest: Effort normal and breath sounds normal. No respiratory distress. She has no wheezes. She has no rales.   Abdominal: Soft. She exhibits no distension. There is no tenderness. There is no rebound.   Musculoskeletal: She exhibits no edema.   Neurological: She is alert and oriented to person, place, and time.   Residual left-sided weakness from prior CVA   Skin: Skin is warm and dry. She is not diaphoretic. No erythema.   Psychiatric: She has a normal mood and affect. Her behavior is normal. Thought content normal.   Nursing note and vitals reviewed.      Significant Labs:   CMP   Recent Labs   Lab 11/03/18  2133 11/05/18  0357    137   K 4.1 4.1    111*   CO2 19* 16*    200*   BUN 12 20   CREATININE 0.9 0.9   CALCIUM 9.5 8.4*   PROT 7.9 6.8   ALBUMIN 2.9* 2.5*   BILITOT 0.8 0.3   ALKPHOS 86 73   AST 67* 67*   ALT 50* 44   ANIONGAP 12 10   ESTGFRAFRICA >60 >60   EGFRNONAA >60 >60   , CBC   Recent Labs   Lab 11/03/18  2133 11/05/18  0357   WBC 7.98 12.92*   HGB 12.2 10.7*   HCT 35.7* 33.1*    251   , Troponin   Recent Labs   Lab 11/04/18  0622 11/04/18  1150 11/04/18  1751   TROPONINI 0.006 <0.006 <0.006    and All pertinent lab results from the last 24 hours have been reviewed.    Significant Imaging: Echocardiogram:   2D echo with color flow doppler:   Results for orders placed or performed in visit on 09/26/18   2D echo with color flow doppler   Result Value Ref Range    Calculated EF 60 55 - 65    Diastolic Dysfunction No     Pericardial Effusion MODERATE (A)     Mitral Valve Mobility NORMAL     and X-Ray: CXR: X-Ray Chest 1 View (CXR): No results found for this visit on 11/03/18. and X-Ray Chest PA and Lateral (CXR): No results found for this visit on 11/03/18.    Assessment and Plan:   Patient who presents with acute bronchitis and pleuritic chest pain. Stable CV wise. Troponin flat and EKG without acute ischemic changes. Known multivessel  CAD. Will have CVT evaluate patient while she is admitted. Continue home meds.     * VTE (venous thromboembolism)    -Continue anticoagulation with Lovenox     Acute bronchitis    -Mgmt as per hospital medicine  -On abx and neb's     Essential hypertension    -Continue home meds     CVA (cerebral vascular accident)    -Stable  -Continue ASA, statin  -Still with residual left-sided weakness     Coronary artery disease of native artery of native heart with stable angina pectoris    -Stable, s/p recent MI with C and known multivessel CAD  -Complains of pleuritic chest discomfort  -Troponin flat  -EKG without acute ischemic changes  -2D echo pending  -Continue home meds-ASA, statin, BB, Imdur, hydralazine  -Will consult CVT for further rec's since patient is admitted           VTE Risk Mitigation (From admission, onward)        Ordered     enoxaparin injection 100 mg  Every 12 hours (non-standard times)      11/04/18 0159     Place sequential compression device  Until discontinued      11/04/18 0133          Thank you for your consult. I will follow-up with patient. Please contact us if you have any additional questions.    Abena Cuellar PA-C  Cardiology   [facility]    Chart reviewed. Dr. Luna examined patient and agrees with plan as outlined above.

## 2018-11-05 NOTE — ASSESSMENT & PLAN NOTE
Recent history of CAD per family.  Continue aspirin, Lovenox, statin, beta-blocker, ACE-inhibitor.  Troponin 0.029, 0.024. To 0.006.  Possible EKG changes of ischemia.  Consult Cardiology.

## 2018-11-05 NOTE — SUBJECTIVE & OBJECTIVE
Past Medical History:   Diagnosis Date    Coronary artery disease     Coronary artery disease of native artery of native heart with stable angina pectoris 2018    Hypercholesteremia     Hypertension     Neuropathy     Stroke        Past Surgical History:   Procedure Laterality Date     SECTION         Review of patient's allergies indicates:  No Known Allergies    No current facility-administered medications on file prior to encounter.      Current Outpatient Medications on File Prior to Encounter   Medication Sig    amitriptyline (ELAVIL) 50 MG tablet Take 50 mg by mouth every evening.    apixaban (ELIQUIS) 5 mg Tab Take 5 mg by mouth 2 (two) times daily.    aspirin (ECOTRIN) 81 MG EC tablet Take 81 mg by mouth once daily.    atorvastatin (LIPITOR) 80 MG tablet Take 80 mg by mouth once daily.    docusate sodium (COLACE) 100 MG capsule Take 1 capsule (100 mg total) by mouth 2 (two) times daily as needed for Constipation.    gabapentin (NEURONTIN) 600 MG tablet Take 600 mg by mouth 3 (three) times daily.    hydrALAZINE (APRESOLINE) 25 MG tablet Take 1 tablet (25 mg total) by mouth 3 (three) times daily.    isosorbide mononitrate (IMDUR) 60 MG 24 hr tablet Take 1 tablet (60 mg total) by mouth 2 (two) times daily.    lisinopril (PRINIVIL,ZESTRIL) 40 MG tablet Take 40 mg by mouth once daily.    metoprolol succinate (TOPROL-XL) 25 MG 24 hr tablet Take 1 tablet (25 mg total) by mouth once daily. (Patient taking differently: Take 25 mg by mouth once daily. )    NIFEdipine (ADALAT CC) 30 MG TbSR Take 30 mg by mouth once daily.    oxybutynin (DITROPAN) 5 MG Tab Take 5 mg by mouth 2 (two) times daily.    hydrocortisone 2.5 % cream Apply topically 2 (two) times daily. for 7 days    nitroGLYCERIN (NITROSTAT) 0.4 MG SL tablet Place 1 tablet (0.4 mg total) under the tongue every 5 (five) minutes as needed for Chest pain.     Family History     Problem Relation (Age of Onset)    Hypertension  Mother        Tobacco Use    Smoking status: Former Smoker    Smokeless tobacco: Never Used   Substance and Sexual Activity    Alcohol use: No     Frequency: Never    Drug use: No    Sexual activity: Not on file     Review of Systems   Constitution: Positive for fever, weakness and malaise/fatigue.   HENT: Negative.    Eyes: Negative.    Cardiovascular: Positive for dyspnea on exertion.   Respiratory: Positive for cough, shortness of breath and wheezing.    Endocrine: Negative.    Hematologic/Lymphatic: Negative.    Skin: Negative.    Musculoskeletal: Negative.    Gastrointestinal: Positive for nausea.   Genitourinary: Negative.    Psychiatric/Behavioral: Negative.    Allergic/Immunologic: Negative.      Objective:     Vital Signs (Most Recent):  Temp: 96.7 °F (35.9 °C) (11/05/18 1157)  Pulse: 100 (11/05/18 1157)  Resp: 17 (11/05/18 1157)  BP: (!) 115/58 (11/05/18 1157)  SpO2: 99 % (11/05/18 1157) Vital Signs (24h Range):  Temp:  [96.3 °F (35.7 °C)-98.2 °F (36.8 °C)] 96.7 °F (35.9 °C)  Pulse:  [] 100  Resp:  [17-22] 17  SpO2:  [92 %-99 %] 99 %  BP: (114-124)/(56-67) 115/58     Weight: 96.7 kg (213 lb 3 oz)  Body mass index is 37.76 kg/m².    SpO2: 99 %  O2 Device (Oxygen Therapy): room air      Intake/Output Summary (Last 24 hours) at 11/5/2018 1240  Last data filed at 11/4/2018 1300  Gross per 24 hour   Intake --   Output 101 ml   Net -101 ml       Lines/Drains/Airways     Peripheral Intravenous Line                 Peripheral IV - Single Lumen 11/03/18 2115 Left Hand 1 day         Peripheral IV - Single Lumen 11/03/18 2130 Left Forearm 1 day                Physical Exam   Constitutional: She is oriented to person, place, and time. She appears well-developed and well-nourished. No distress.   HENT:   Head: Normocephalic and atraumatic.   Eyes: Pupils are equal, round, and reactive to light. Right eye exhibits no discharge. Left eye exhibits no discharge.   Neck: Neck supple. No JVD present. No  thyromegaly present.   Cardiovascular: Normal rate, regular rhythm, S1 normal, S2 normal and normal heart sounds.   No murmur heard.  Pulmonary/Chest: Effort normal and breath sounds normal. No respiratory distress. She has no wheezes. She has no rales.   Abdominal: Soft. She exhibits no distension. There is no tenderness. There is no rebound.   Musculoskeletal: She exhibits no edema.   Neurological: She is alert and oriented to person, place, and time.   Residual left-sided weakness from prior CVA   Skin: Skin is warm and dry. She is not diaphoretic. No erythema.   Psychiatric: She has a normal mood and affect. Her behavior is normal. Thought content normal.   Nursing note and vitals reviewed.      Significant Labs:   CMP   Recent Labs   Lab 11/03/18  2133 11/05/18  0357    137   K 4.1 4.1    111*   CO2 19* 16*    200*   BUN 12 20   CREATININE 0.9 0.9   CALCIUM 9.5 8.4*   PROT 7.9 6.8   ALBUMIN 2.9* 2.5*   BILITOT 0.8 0.3   ALKPHOS 86 73   AST 67* 67*   ALT 50* 44   ANIONGAP 12 10   ESTGFRAFRICA >60 >60   EGFRNONAA >60 >60   , CBC   Recent Labs   Lab 11/03/18  2133 11/05/18  0357   WBC 7.98 12.92*   HGB 12.2 10.7*   HCT 35.7* 33.1*    251   , Troponin   Recent Labs   Lab 11/04/18  0622 11/04/18  1150 11/04/18  1751   TROPONINI 0.006 <0.006 <0.006    and All pertinent lab results from the last 24 hours have been reviewed.    Significant Imaging: Echocardiogram:   2D echo with color flow doppler:   Results for orders placed or performed in visit on 09/26/18   2D echo with color flow doppler   Result Value Ref Range    Calculated EF 60 55 - 65    Diastolic Dysfunction No     Pericardial Effusion MODERATE (A)     Mitral Valve Mobility NORMAL     and X-Ray: CXR: X-Ray Chest 1 View (CXR): No results found for this visit on 11/03/18. and X-Ray Chest PA and Lateral (CXR): No results found for this visit on 11/03/18.

## 2018-11-05 NOTE — HOSPITAL COURSE
Admitted for evaluation and treatment of acute shortness of breath and chest pain. Started on steroids and abx for acute bronchitis since CXR negative for infection and no leukocytosis upon admission.  Known DVT on anti-coagulation with Apixaban.  Started therapeutic Lovenox.  Unable to exclude small to moderate PE by CT angio of the chest. Cards consulted. CVT consulted and will see pt in clinic.  Pulm consulted. Pt seen and examined. Deemed suitable for d/c. Pt requested to be d/c to see her cardiologist as outpt.

## 2018-11-05 NOTE — SUBJECTIVE & OBJECTIVE
Interval History:  Interval improvement in chest pain and shortness of breath.    Review of Systems   Constitutional: Negative for chills and fever.   HENT: Negative for congestion and sore throat.    Eyes: Negative for visual disturbance.   Respiratory: Positive for cough and shortness of breath. Negative for wheezing.    Cardiovascular: Positive for chest pain. Negative for palpitations and leg swelling.   Gastrointestinal: Negative for abdominal pain, blood in stool, constipation, diarrhea, nausea and vomiting.   Genitourinary: Negative for dysuria and hematuria.   Musculoskeletal: Positive for arthralgias. Negative for back pain.   Skin: Negative for rash and wound.   Neurological: Negative for dizziness, weakness, light-headedness and numbness.   Hematological: Negative for adenopathy.     Objective:     Vital Signs (Most Recent):  Temp: 97.7 °F (36.5 °C) (11/04/18 1956)  Pulse: 100 (11/04/18 2032)  Resp: 20 (11/04/18 2032)  BP: (!) 121/58 (11/04/18 1956)  SpO2: 95 % (11/04/18 2032) Vital Signs (24h Range):  Temp:  [96.3 °F (35.7 °C)-99.2 °F (37.3 °C)] 97.7 °F (36.5 °C)  Pulse:  [] 100  Resp:  [16-27] 20  SpO2:  [92 %-99 %] 95 %  BP: ()/(56-78) 121/58     Weight: 96.7 kg (213 lb 3 oz)  Body mass index is 37.76 kg/m².    Intake/Output Summary (Last 24 hours) at 11/4/2018 2119  Last data filed at 11/4/2018 1300  Gross per 24 hour   Intake 3201 ml   Output 101 ml   Net 3100 ml      Physical Exam   Constitutional: She is oriented to person, place, and time. She appears well-developed and well-nourished. No distress.   HENT:   Head: Normocephalic and atraumatic.   Mouth/Throat: Oropharynx is clear and moist.   Eyes: Conjunctivae and EOM are normal. Pupils are equal, round, and reactive to light.   Neck: Neck supple. No JVD present. No thyromegaly present.   Cardiovascular: Normal rate and regular rhythm. Exam reveals no gallop and no friction rub.   No murmur heard.  Pulmonary/Chest: Effort normal and  breath sounds normal. She has no wheezes. She has no rales.   Occasional crackles right lower lobe.   Abdominal: Soft. Bowel sounds are normal. She exhibits no distension. There is no tenderness. There is no rebound and no guarding.   Musculoskeletal: Normal range of motion. She exhibits no edema or deformity.   Tenderness left calf and popliteal fossa.  No palpable cords or masses.   Lymphadenopathy:     She has no cervical adenopathy.   Neurological: She is alert and oriented to person, place, and time. She has normal reflexes.   Skin: Skin is warm and dry. No rash noted.   Psychiatric: She has a normal mood and affect. Her behavior is normal. Judgment and thought content normal.   Nursing note and vitals reviewed.      Significant Labs: All pertinent labs within the past 24 hours have been reviewed.    Significant Imaging: I have reviewed all pertinent imaging results/findings within the past 24 hours.

## 2018-11-05 NOTE — PLAN OF CARE
Problem: Patient Care Overview  Goal: Plan of Care Review  Outcome: Ongoing (interventions implemented as appropriate)  Pt free of falls during shift. VSS. Peripheral IV intact. Tele ordered pt on the monitor. Pt on RA no shortness of breath noted. Pain controlled. Call light in reach. Hourly rounds done. Family present at bedside. Will continue to monitor

## 2018-11-05 NOTE — HPI
The patient is a 53-year-old female with past medical history of a recent CVA, recent DVT on apixaban, hypertension, coronary artery disease status post stent placement, and pericardial effusion.  The patient has had multiple visits to the emergency room in the past month.  Her most recent visit was for complaints of fever and cough.  Patient denies shortness of breath.  Patient denies orthopnea or paroxysmal nocturnal dyspnea, denies chest pain, angina, leg swelling, palpitations or dizziness

## 2018-11-05 NOTE — CONSULTS
[facility] -   Pulmonology  Consult Note    Patient Name: Tri Staton  MRN: [id number]  Admission Date: 11/3/2018  Hospital Length of Stay: 1 days  Code Status: Full Code  Attending Physician: Cori Walker MD  Primary Care Provider: Primary Doctor No   Principal Problem: VTE (venous thromboembolism)      Subjective:     HPI:  Ms Staton is 53 Years old female  Asked to see for abn Chest CT:   Admitted for Cough, fever 100.2 , wheezing  A history of CVA, with residual left-sided deficits, recently suffered an MI one month ago, had a stent placed, and at around the same time was diagnosed with left lower extremity DVT.    Patient has been on aspirin, apixaban, BB, ACEI and statin.  Patient reports compliance with medications..    Recent CVA with residual left sided weakness Aug 2018  Says get vaccination:Pneumovac and flu shot in [place]  Former smoker 1 PPD until recenty ( 5 years) and ETOH quit 9 months ago  Denies asthma or COPD  Hx of snoring. Denies apnea    Past Medical History:   Diagnosis Date    Coronary artery disease     Coronary artery disease of native artery of native heart with stable angina pectoris 2018    Hypercholesteremia     Hypertension     Neuropathy     Stroke        Past Surgical History:   Procedure Laterality Date     SECTION         Review of patient's allergies indicates:  No Known Allergies    Family History     Problem Relation (Age of Onset)    Hypertension Mother        Tobacco Use    Smoking status: Former Smoker    Smokeless tobacco: Never Used   Substance and Sexual Activity    Alcohol use: No     Frequency: Never    Drug use: No    Sexual activity: Not on file         Review of Systems   Constitutional: Positive for fatigue.   HENT: Negative.    Eyes: Negative.    Respiratory: Positive for apnea, shortness of breath and wheezing.    Cardiovascular: Negative.    Gastrointestinal: Negative.    Endocrine: Negative.    Genitourinary:  Negative.    Musculoskeletal: Negative.    Allergic/Immunologic: Negative.    Neurological: Negative.    Hematological: Negative.    Psychiatric/Behavioral: Negative.      Objective:     Vital Signs (Most Recent):  Temp: 96.7 °F (35.9 °C) (11/05/18 1157)  Pulse: 100 (11/05/18 1157)  Resp: 17 (11/05/18 1157)  BP: (!) 115/58 (11/05/18 1157)  SpO2: 99 % (11/05/18 1157) Vital Signs (24h Range):  Temp:  [96.3 °F (35.7 °C)-98.2 °F (36.8 °C)] 96.7 °F (35.9 °C)  Pulse:  [] 100  Resp:  [17-22] 17  SpO2:  [92 %-99 %] 99 %  BP: (114-124)/(56-67) 115/58     Weight: 96.7 kg (213 lb 3 oz)  Body mass index is 37.76 kg/m².    No intake or output data in the 24 hours ending 11/05/18 1356    Physical Exam   Constitutional: She is oriented to person, place, and time. Vital signs are normal. She appears well-developed and well-nourished.   HENT:   Head: Normocephalic and atraumatic.   Nose: Nose normal.   Eyes: Conjunctivae and EOM are normal. Pupils are equal, round, and reactive to light.   Neck: Normal range of motion. Neck supple.   Cardiovascular: Normal rate, regular rhythm and normal heart sounds.   No murmur heard.  Pulmonary/Chest: Effort normal. She has wheezes.   Abdominal: Soft. Bowel sounds are normal.   Musculoskeletal: Normal range of motion. She exhibits no edema.   Neurological: She is alert and oriented to person, place, and time.   Skin: Skin is warm and dry. Capillary refill takes 2 to 3 seconds.   Psychiatric: She has a normal mood and affect.   Nursing note and vitals reviewed.      Vents:       Lines/Drains/Airways     Peripheral Intravenous Line                 Peripheral IV - Single Lumen 11/03/18 2115 Left Hand 1 day         Peripheral IV - Single Lumen 11/03/18 2130 Left Forearm 1 day                Significant Labs:    CBC/Anemia Profile:  Recent Labs   Lab 11/03/18 2133 11/05/18  0357   WBC 7.98 12.92*   HGB 12.2 10.7*   HCT 35.7* 33.1*    251   MCV 91 91   RDW 14.8* 15.0*         Chemistries:  Recent Labs   Lab 11/03/18 2133 11/05/18  0357    137   K 4.1 4.1    111*   CO2 19* 16*   BUN 12 20   CREATININE 0.9 0.9   CALCIUM 9.5 8.4*   ALBUMIN 2.9* 2.5*   PROT 7.9 6.8   BILITOT 0.8 0.3   ALKPHOS 86 73   ALT 50* 44   AST 67* 67*   MG 1.6 1.7   PHOS 4.2 2.8       ABGs:   Recent Labs   Lab 11/03/18 2112   PH 7.493*   PCO2 36.0   HCO3 27.7   POCSATURATED 55*   BE 4     All pertinent labs within the past 24 hours have been reviewed.    Significant Imaging:   I have reviewed and interpreted all pertinent imaging results/findings within the past 24 hours.       CHEST CT  Examination is limited due to patient respiratory motion artifact with artifactual hypodensities and partially obscuring the pulmonary arteries.  No large pulmonary embolus is identified.  Stable cardiomegaly, coronary artery calcifications and small to moderate pericardial effusion.  Stable small mediastinal and hilar lymph nodes without pathologic enlargement.  No aortic aneurysm or dissection is identified.  There is focal endobronchial mucoid impaction within a subsegmental posterior right lower lobe bronchus.  Stable patchy discoid atelectasis in the left lung base.  Improving discoid atelectasis in the posterior right lower lobe.  Stable chronic bilateral perihilar ground-glass opacities versus patient motion artifact.  No new infiltrates identified.  Negative for pleural effusion or pneumothorax.  No acute osseous abnormality is seen. Limited images through the upper abdomen demonstrate no acute findings.  Stable enlarged cirrhotic appearing liver.  Stable small gallstones.    ECHO  1 - Normal left ventricular systolic function (EF 60-65%).     2 - Indeterminate LV diastolic function.     3 - Normal right ventricular systolic function .     4 - No wall motion abnormalities.     5 - Concentric remodeling.     6 - Mild left atrial enlargement.     7 - The estimated PA systolic pressure is 32 mmHg.     8 - Mild  tricuspid regurgitation.     9 - Moderate pericardial effusion.     10 - No echo evidence of tampoade    Assessment/Plan:     * VTE (venous thromboembolism)    Therapeutic dose LMWH     Discoid atelectasis    Incentive spirometry  bronchodilators     Pericardial effusion    Stable hemodynamics, similar to prior echo     Suspected sleep apnea    Snoring, HTN, recent CVA, CAD and BMl need Sleep study     Mucoid impaction of bronchi    Acapella treatments and incentive spirimetry     Acute bronchitis    Acapella  Sputum culture  IV solumedrol  Deescalated to Moxifloxacin     CVA (cerebral vascular accident)    Aspirin  Atrovastatin  Swallow eval     Coronary artery disease of native artery of native heart with stable angina pectoris    Multivessel disease: awaits CVT           Thank you for your consult. I will follow-up with patient. Please contact us if you have any additional questions.     Eladio Dougherty MD  Pulmonology  [facility] [place]

## 2018-11-05 NOTE — ASSESSMENT & PLAN NOTE
The patient is a 53-year-old female with multiple medical problems including recent CVA, recent DVT currently on anticoagulation.  Most recently patient is being admitted for treatment of fever cough and elevated white count.  The patient has no angina or chest pain. Troponin levels are been unremarkable.  In the setting of multiple medical problems, the patient is not a candidate for coronary artery bypass grafting that this moment.  The patient will be followed in outpatient cardiothoracic surgery Clinic.

## 2018-11-05 NOTE — CONSULTS
[facility] -   Cardiothoracic Surgery  Consult Note    Patient Name: Tri Staton  MRN: [id number]  Admission Date: 11/3/2018  Attending Physician: Cori Walker MD  Referring Provider: Self, Aaareferral    Patient information was obtained from patient, relative(s), past medical records and ER records.     Consults  Subjective:     Principal Problem: VTE (venous thromboembolism)    History of Present Illness: The patient is a 53-year-old female with past medical history of a recent CVA, recent DVT on apixaban, hypertension, coronary artery disease status post stent placement, and pericardial effusion.  The patient has had multiple visits to the emergency room in the past month.  Her most recent visit was for complaints of fever and cough.  Patient denies shortness of breath.  Patient denies orthopnea or paroxysmal nocturnal dyspnea, denies chest pain, angina, leg swelling, palpitations or dizziness    No current facility-administered medications on file prior to encounter.      Current Outpatient Medications on File Prior to Encounter   Medication Sig    amitriptyline (ELAVIL) 50 MG tablet Take 50 mg by mouth every evening.    apixaban (ELIQUIS) 5 mg Tab Take 5 mg by mouth 2 (two) times daily.    aspirin (ECOTRIN) 81 MG EC tablet Take 81 mg by mouth once daily.    atorvastatin (LIPITOR) 80 MG tablet Take 80 mg by mouth once daily.    docusate sodium (COLACE) 100 MG capsule Take 1 capsule (100 mg total) by mouth 2 (two) times daily as needed for Constipation.    gabapentin (NEURONTIN) 600 MG tablet Take 600 mg by mouth 3 (three) times daily.    hydrALAZINE (APRESOLINE) 25 MG tablet Take 1 tablet (25 mg total) by mouth 3 (three) times daily.    isosorbide mononitrate (IMDUR) 60 MG 24 hr tablet Take 1 tablet (60 mg total) by mouth 2 (two) times daily.    lisinopril (PRINIVIL,ZESTRIL) 40 MG tablet Take 40 mg by mouth once daily.    metoprolol succinate (TOPROL-XL) 25 MG 24 hr tablet  Take 1 tablet (25 mg total) by mouth once daily. (Patient taking differently: Take 25 mg by mouth once daily. )    NIFEdipine (ADALAT CC) 30 MG TbSR Take 30 mg by mouth once daily.    oxybutynin (DITROPAN) 5 MG Tab Take 5 mg by mouth 2 (two) times daily.    hydrocortisone 2.5 % cream Apply topically 2 (two) times daily. for 7 days    nitroGLYCERIN (NITROSTAT) 0.4 MG SL tablet Place 1 tablet (0.4 mg total) under the tongue every 5 (five) minutes as needed for Chest pain.       Review of patient's allergies indicates:  No Known Allergies    Past Medical History:   Diagnosis Date    Coronary artery disease     Coronary artery disease of native artery of native heart with stable angina pectoris 2018    Hypercholesteremia     Hypertension     Neuropathy     Stroke      Past Surgical History:   Procedure Laterality Date     SECTION       Family History     Problem Relation (Age of Onset)    Hypertension Mother        Tobacco Use    Smoking status: Former Smoker    Smokeless tobacco: Never Used   Substance and Sexual Activity    Alcohol use: No     Frequency: Never    Drug use: No    Sexual activity: Not on file     Review of Systems   Constitutional: Positive for activity change and fever. Negative for appetite change, chills and diaphoresis.   HENT: Positive for congestion. Negative for ear discharge, ear pain and facial swelling.    Eyes: Negative for pain, discharge and visual disturbance.   Respiratory: Positive for cough. Negative for apnea, chest tightness and shortness of breath.    Cardiovascular: Negative for chest pain, palpitations and leg swelling.   Gastrointestinal: Positive for anal bleeding, blood in stool and constipation. Negative for abdominal distention, abdominal pain and diarrhea.   Endocrine: Negative for cold intolerance, heat intolerance and polyuria.   Genitourinary: Negative for difficulty urinating, dysuria, flank pain and hematuria.   Musculoskeletal: Positive for  gait problem. Negative for arthralgias.   Allergic/Immunologic: Negative for environmental allergies and food allergies.   Neurological: Positive for facial asymmetry. Negative for dizziness, light-headedness and headaches.   Hematological: Does not bruise/bleed easily.   Psychiatric/Behavioral: Negative for agitation and behavioral problems.     Objective:     Vital Signs (Most Recent):  Temp: 96.7 °F (35.9 °C) (11/05/18 1157)  Pulse: 100 (11/05/18 1157)  Resp: 17 (11/05/18 1157)  BP: (!) 115/58 (11/05/18 1157)  SpO2: 99 % (11/05/18 1157) Vital Signs (24h Range):  Temp:  [96.7 °F (35.9 °C)-98.2 °F (36.8 °C)] 96.7 °F (35.9 °C)  Pulse:  [] 100  Resp:  [17-22] 17  SpO2:  [92 %-99 %] 99 %  BP: (114-123)/(58-67) 115/58     Weight: 96.7 kg (213 lb 3 oz)  Body mass index is 37.76 kg/m².    SpO2: 99 %  O2 Device (Oxygen Therapy): room air     Intake/Output - Last 3 Shifts       11/03 0700 - 11/04 0659 11/04 0700 - 11/05 0659 11/05 0700 - 11/06 0659    IV Piggyback 3201      Total Intake(mL/kg) 3201 (33.1)      Urine (mL/kg/hr)  100 (0)     Stool  1     Total Output  101     Net +3201 -101            Urine Occurrence   1 x           Lines/Drains/Airways     Peripheral Intravenous Line                 Peripheral IV - Single Lumen 11/03/18 2115 Left Hand 1 day         Peripheral IV - Single Lumen 11/03/18 2130 Left Forearm 1 day                 STS Risk Score:n/a    Physical Exam   Constitutional: She is oriented to person, place, and time. She appears well-developed and well-nourished. No distress.   HENT:   Head: Normocephalic and atraumatic.   Mouth/Throat: Oropharynx is clear and moist.   Eyes: Conjunctivae and EOM are normal. Pupils are equal, round, and reactive to light.   Neck: No JVD present.   Cardiovascular: Normal rate, regular rhythm and normal heart sounds.   No murmur heard.  Pulmonary/Chest: Effort normal and breath sounds normal.   Abdominal: Soft. Bowel sounds are normal.   Musculoskeletal: She  exhibits no edema or deformity.   Neurological: She is alert and oriented to person, place, and time.   Skin: Skin is warm and dry. She is not diaphoretic.   Psychiatric: She has a normal mood and affect.       Significant Labs:  All pertinent labs from the last 24 hours have been reviewed.    Significant Diagnostics:  I have reviewed all pertinent imaging results/findings within the past 24 hours.    Assessment/Plan:     NYHA Score: NYHA III: marked limitation of physical activity, comfortable at rest    Coronary artery disease of native artery of native heart with stable angina pectoris    The patient is a 53-year-old female with multiple medical problems including recent CVA, recent DVT currently on anticoagulation.  Most recently patient is being admitted for treatment of fever cough and elevated white count.  The patient has no angina or chest pain. Troponin levels are been unremarkable.  In the setting of multiple medical problems, the patient is not a candidate for coronary artery bypass grafting that this moment.  The patient will be followed in outpatient cardiothoracic surgery Clinic.         Thank you for your consult. I will sign off. Please contact us if you have any additional questions.    Fransico Steele MD  Cardiothoracic Surgery  [facility]

## 2018-11-05 NOTE — PLAN OF CARE
Problem: Physical Therapy Goal  Goal: Physical Therapy Goal  Outcome: Ongoing (interventions implemented as appropriate)  PT REQUIRED JORDAN FOR BED MOBILITY AND TRANSFERS,  AND AMBULATED JORDAN HHA IN ROOM 22 FEET WITH NO LOB.

## 2018-11-05 NOTE — DISCHARGE SUMMARY
[facility] Hospital Medicine  Discharge Summary      Patient Name: Tri Staton  MRN: [id number]  Admission Date: 11/3/2018  Hospital Length of Stay: 1 days  Discharge Date and Time:  11/05/2018 5:43 PM  Attending Physician: Cori Walker MD   Discharging Provider: Cori Walker MD  Primary Care Provider: Primary Doctor Leslie      HPI:   Ms. Staton araceli  a 53-year-old  female with a history of CVA, with residual left-sided deficits, recently suffered an MI one month ago, had a stent placed, and at around the same time was diagnosed with left lower extremity DVT.  Patient has been on aspirin, apixaban, BB, ACEI and statin.  Patient reports compliance with medications..  Today she presented to the ED complaining of low-grade fever of 100.3 at home associated with increasing confusion per family members.  Patient also complains of nonproductive cough, worsening shortness of breath.  WBC and lactic acid are within normal limits.  CTA chest was done that could not exclude pulmonary embolism.  In spite of being on apixaban, and inability to exclude PE, patient was administered Lovenox 1 milligram/kilogram subcu x1 in the ED.  Blood cultures have been obtained.  Patient started on IV Rocephin and Zithromax empirically.  Discussed with multiple family members at the bedside, including patient's mother.    * No surgery found *      Hospital Course:   Admitted for evaluation and treatment of acute shortness of breath and chest pain. Started on steroids and abx for acute bronchitis since CXR negative for infection and no leukocytosis upon admission.  Known DVT on anti-coagulation with Apixaban.  Started therapeutic Lovenox.  Unable to exclude small to moderate PE by CT angio of the chest. Cards consulted. CVT consulted and will see pt in clinic.  Pulm consulted. Pt seen and examined. Deemed suitable for d/c. Pt requested to be d/c to see her cardiologist as outpt.       Consults:   Consults (From admission, onward)        Status Ordering Provider     Inpatient consult to Cardiology  Once     Provider:  Luis Daniel Luna MD    Completed BILL GERONIMO     Inpatient consult to Cardiothoracic Surgery  Once     Provider:  Fransico Steele MD    Acknowledged MIC DEMPSEY     Inpatient consult to Pulmonology  Once     Provider:  Eladio Dougherty MD    Acknowledged ISMA ABREU          No new Assessment & Plan notes have been filed under this hospital service since the last note was generated.  Service: Hospital Medicine    Final Active Diagnoses:    Diagnosis Date Noted POA    PRINCIPAL PROBLEM:  VTE (venous thromboembolism) [I82.90] 11/04/2018 Yes    Mucoid impaction of bronchi [J98.09] 11/05/2018 Yes    Discoid atelectasis [J98.11] 11/05/2018 Yes    Suspected sleep apnea [R29.818] 11/05/2018 Unknown    Acute bronchitis [J20.9] 11/04/2018 Yes    CVA (cerebral vascular accident) [I63.9] 09/20/2018 Yes    Coronary artery disease of native artery of native heart with stable angina pectoris [I25.118] 09/20/2018 Yes    Essential hypertension [I10] 09/20/2018 Yes    Pericardial effusion [I31.3] 09/20/2018 Yes      Problems Resolved During this Admission:       Discharged Condition: stable    Disposition: Home or Self Care    Follow Up:    Patient Instructions:      Diet diabetic     Activity as tolerated       Significant Diagnostic Studies: Labs:   CMP   Recent Labs   Lab 11/03/18 2133 11/05/18  0357    137   K 4.1 4.1    111*   CO2 19* 16*    200*   BUN 12 20   CREATININE 0.9 0.9   CALCIUM 9.5 8.4*   PROT 7.9 6.8   ALBUMIN 2.9* 2.5*   BILITOT 0.8 0.3   ALKPHOS 86 73   AST 67* 67*   ALT 50* 44   ANIONGAP 12 10   ESTGFRAFRICA >60 >60   EGFRNONAA >60 >60    and CBC   Recent Labs   Lab 11/03/18 2133 11/05/18  0357   WBC 7.98 12.92*   HGB 12.2 10.7*   HCT 35.7* 33.1*    251       Pending Diagnostic Studies:     None          Medications:  Reconciled Home Medications:      Medication List      START taking these medications    levoFLOXacin 750 MG tablet  Commonly known as:  LEVAQUIN  Take 1 tablet (750 mg total) by mouth once daily.        CONTINUE taking these medications    amitriptyline 50 MG tablet  Commonly known as:  ELAVIL  Take 50 mg by mouth every evening.     aspirin 81 MG EC tablet  Commonly known as:  ECOTRIN  Take 81 mg by mouth once daily.     atorvastatin 80 MG tablet  Commonly known as:  LIPITOR  Take 80 mg by mouth once daily.     docusate sodium 100 MG capsule  Commonly known as:  COLACE  Take 1 capsule (100 mg total) by mouth 2 (two) times daily as needed for Constipation.     ELIQUIS 5 mg Tab  Generic drug:  apixaban  Take 5 mg by mouth 2 (two) times daily.     gabapentin 600 MG tablet  Commonly known as:  NEURONTIN  Take 600 mg by mouth 3 (three) times daily.     hydrALAZINE 25 MG tablet  Commonly known as:  APRESOLINE  Take 1 tablet (25 mg total) by mouth 3 (three) times daily.     hydrocortisone 2.5 % cream  Apply topically 2 (two) times daily. for 7 days     isosorbide mononitrate 60 MG 24 hr tablet  Commonly known as:  IMDUR  Take 1 tablet (60 mg total) by mouth 2 (two) times daily.     lisinopril 40 MG tablet  Commonly known as:  PRINIVIL,ZESTRIL  Take 40 mg by mouth once daily.     metoprolol succinate 25 MG 24 hr tablet  Commonly known as:  TOPROL-XL  Take 1 tablet (25 mg total) by mouth once daily.     NIFEdipine 30 MG Tbsr  Commonly known as:  ADALAT CC  Take 30 mg by mouth once daily.     nitroGLYCERIN 0.4 MG SL tablet  Commonly known as:  NITROSTAT  Place 1 tablet (0.4 mg total) under the tongue every 5 (five) minutes as needed for Chest pain.     oxybutynin 5 MG Tab  Commonly known as:  DITROPAN  Take 5 mg by mouth 2 (two) times daily.        STOP taking these medications    cefUROXime 500 MG tablet  Commonly known as:  CEFTIN            Indwelling Lines/Drains at time of discharge:    Lines/Drains/Airways          None          Time spent on the discharge of patient: >30 minutes  Patient was seen and examined on the date of discharge and determined to be suitable for discharge.         Cori Walker MD  Department of Hospital Medicine  [facility]

## 2018-11-05 NOTE — SUBJECTIVE & OBJECTIVE
Past Medical History:   Diagnosis Date    Coronary artery disease     Coronary artery disease of native artery of native heart with stable angina pectoris 2018    Hypercholesteremia     Hypertension     Neuropathy     Stroke        Past Surgical History:   Procedure Laterality Date     SECTION         Review of patient's allergies indicates:  No Known Allergies    Family History     Problem Relation (Age of Onset)    Hypertension Mother        Tobacco Use    Smoking status: Former Smoker    Smokeless tobacco: Never Used   Substance and Sexual Activity    Alcohol use: No     Frequency: Never    Drug use: No    Sexual activity: Not on file         Review of Systems   Constitutional: Positive for fatigue.   HENT: Negative.    Eyes: Negative.    Respiratory: Positive for apnea, shortness of breath and wheezing.    Cardiovascular: Negative.    Gastrointestinal: Negative.    Endocrine: Negative.    Genitourinary: Negative.    Musculoskeletal: Negative.    Allergic/Immunologic: Negative.    Neurological: Negative.    Hematological: Negative.    Psychiatric/Behavioral: Negative.      Objective:     Vital Signs (Most Recent):  Temp: 96.7 °F (35.9 °C) (18 1157)  Pulse: 100 (18 115)  Resp: 17 (18 115)  BP: (!) 115/58 (18 115)  SpO2: 99 % (18 115) Vital Signs (24h Range):  Temp:  [96.3 °F (35.7 °C)-98.2 °F (36.8 °C)] 96.7 °F (35.9 °C)  Pulse:  [] 100  Resp:  [17-22] 17  SpO2:  [92 %-99 %] 99 %  BP: (114-124)/(56-67) 115/58     Weight: 96.7 kg (213 lb 3 oz)  Body mass index is 37.76 kg/m².    No intake or output data in the 24 hours ending 18 1356    Physical Exam   Constitutional: She is oriented to person, place, and time. Vital signs are normal. She appears well-developed and well-nourished.   HENT:   Head: Normocephalic and atraumatic.   Nose: Nose normal.   Eyes: Conjunctivae and EOM are normal. Pupils are equal, round, and reactive to light.   Neck:  Normal range of motion. Neck supple.   Cardiovascular: Normal rate, regular rhythm and normal heart sounds.   No murmur heard.  Pulmonary/Chest: Effort normal. She has wheezes.   Abdominal: Soft. Bowel sounds are normal.   Musculoskeletal: Normal range of motion. She exhibits no edema.   Neurological: She is alert and oriented to person, place, and time.   Skin: Skin is warm and dry. Capillary refill takes 2 to 3 seconds.   Psychiatric: She has a normal mood and affect.   Nursing note and vitals reviewed.      Vents:       Lines/Drains/Airways     Peripheral Intravenous Line                 Peripheral IV - Single Lumen 11/03/18 2115 Left Hand 1 day         Peripheral IV - Single Lumen 11/03/18 2130 Left Forearm 1 day                Significant Labs:    CBC/Anemia Profile:  Recent Labs   Lab 11/03/18 2133 11/05/18 0357   WBC 7.98 12.92*   HGB 12.2 10.7*   HCT 35.7* 33.1*    251   MCV 91 91   RDW 14.8* 15.0*        Chemistries:  Recent Labs   Lab 11/03/18 2133 11/05/18 0357    137   K 4.1 4.1    111*   CO2 19* 16*   BUN 12 20   CREATININE 0.9 0.9   CALCIUM 9.5 8.4*   ALBUMIN 2.9* 2.5*   PROT 7.9 6.8   BILITOT 0.8 0.3   ALKPHOS 86 73   ALT 50* 44   AST 67* 67*   MG 1.6 1.7   PHOS 4.2 2.8       ABGs:   Recent Labs   Lab 11/03/18 2112   PH 7.493*   PCO2 36.0   HCO3 27.7   POCSATURATED 55*   BE 4     All pertinent labs within the past 24 hours have been reviewed.    Significant Imaging:   I have reviewed and interpreted all pertinent imaging results/findings within the past 24 hours.       CHEST CT  Examination is limited due to patient respiratory motion artifact with artifactual hypodensities and partially obscuring the pulmonary arteries.  No large pulmonary embolus is identified.  Stable cardiomegaly, coronary artery calcifications and small to moderate pericardial effusion.  Stable small mediastinal and hilar lymph nodes without pathologic enlargement.  No aortic aneurysm or dissection is  identified.  There is focal endobronchial mucoid impaction within a subsegmental posterior right lower lobe bronchus.  Stable patchy discoid atelectasis in the left lung base.  Improving discoid atelectasis in the posterior right lower lobe.  Stable chronic bilateral perihilar ground-glass opacities versus patient motion artifact.  No new infiltrates identified.  Negative for pleural effusion or pneumothorax.  No acute osseous abnormality is seen. Limited images through the upper abdomen demonstrate no acute findings.  Stable enlarged cirrhotic appearing liver.  Stable small gallstones.    ECHO  1 - Normal left ventricular systolic function (EF 60-65%).     2 - Indeterminate LV diastolic function.     3 - Normal right ventricular systolic function .     4 - No wall motion abnormalities.     5 - Concentric remodeling.     6 - Mild left atrial enlargement.     7 - The estimated PA systolic pressure is 32 mmHg.     8 - Mild tricuspid regurgitation.     9 - Moderate pericardial effusion.     10 - No echo evidence of tampoade

## 2018-11-05 NOTE — SUBJECTIVE & OBJECTIVE
No current facility-administered medications on file prior to encounter.      Current Outpatient Medications on File Prior to Encounter   Medication Sig    amitriptyline (ELAVIL) 50 MG tablet Take 50 mg by mouth every evening.    apixaban (ELIQUIS) 5 mg Tab Take 5 mg by mouth 2 (two) times daily.    aspirin (ECOTRIN) 81 MG EC tablet Take 81 mg by mouth once daily.    atorvastatin (LIPITOR) 80 MG tablet Take 80 mg by mouth once daily.    docusate sodium (COLACE) 100 MG capsule Take 1 capsule (100 mg total) by mouth 2 (two) times daily as needed for Constipation.    gabapentin (NEURONTIN) 600 MG tablet Take 600 mg by mouth 3 (three) times daily.    hydrALAZINE (APRESOLINE) 25 MG tablet Take 1 tablet (25 mg total) by mouth 3 (three) times daily.    isosorbide mononitrate (IMDUR) 60 MG 24 hr tablet Take 1 tablet (60 mg total) by mouth 2 (two) times daily.    lisinopril (PRINIVIL,ZESTRIL) 40 MG tablet Take 40 mg by mouth once daily.    metoprolol succinate (TOPROL-XL) 25 MG 24 hr tablet Take 1 tablet (25 mg total) by mouth once daily. (Patient taking differently: Take 25 mg by mouth once daily. )    NIFEdipine (ADALAT CC) 30 MG TbSR Take 30 mg by mouth once daily.    oxybutynin (DITROPAN) 5 MG Tab Take 5 mg by mouth 2 (two) times daily.    hydrocortisone 2.5 % cream Apply topically 2 (two) times daily. for 7 days    nitroGLYCERIN (NITROSTAT) 0.4 MG SL tablet Place 1 tablet (0.4 mg total) under the tongue every 5 (five) minutes as needed for Chest pain.       Review of patient's allergies indicates:  No Known Allergies    Past Medical History:   Diagnosis Date    Coronary artery disease     Coronary artery disease of native artery of native heart with stable angina pectoris 2018    Hypercholesteremia     Hypertension     Neuropathy     Stroke      Past Surgical History:   Procedure Laterality Date     SECTION       Family History     Problem Relation (Age of Onset)    Hypertension Mother         Tobacco Use    Smoking status: Former Smoker    Smokeless tobacco: Never Used   Substance and Sexual Activity    Alcohol use: No     Frequency: Never    Drug use: No    Sexual activity: Not on file     Review of Systems   Constitutional: Positive for activity change and fever. Negative for appetite change, chills and diaphoresis.   HENT: Positive for congestion. Negative for ear discharge, ear pain and facial swelling.    Eyes: Negative for pain, discharge and visual disturbance.   Respiratory: Positive for cough. Negative for apnea, chest tightness and shortness of breath.    Cardiovascular: Negative for chest pain, palpitations and leg swelling.   Gastrointestinal: Positive for anal bleeding, blood in stool and constipation. Negative for abdominal distention, abdominal pain and diarrhea.   Endocrine: Negative for cold intolerance, heat intolerance and polyuria.   Genitourinary: Negative for difficulty urinating, dysuria, flank pain and hematuria.   Musculoskeletal: Positive for gait problem. Negative for arthralgias.   Allergic/Immunologic: Negative for environmental allergies and food allergies.   Neurological: Positive for facial asymmetry. Negative for dizziness, light-headedness and headaches.   Hematological: Does not bruise/bleed easily.   Psychiatric/Behavioral: Negative for agitation and behavioral problems.     Objective:     Vital Signs (Most Recent):  Temp: 96.7 °F (35.9 °C) (11/05/18 1157)  Pulse: 100 (11/05/18 1157)  Resp: 17 (11/05/18 1157)  BP: (!) 115/58 (11/05/18 1157)  SpO2: 99 % (11/05/18 1157) Vital Signs (24h Range):  Temp:  [96.7 °F (35.9 °C)-98.2 °F (36.8 °C)] 96.7 °F (35.9 °C)  Pulse:  [] 100  Resp:  [17-22] 17  SpO2:  [92 %-99 %] 99 %  BP: (114-123)/(58-67) 115/58     Weight: 96.7 kg (213 lb 3 oz)  Body mass index is 37.76 kg/m².    SpO2: 99 %  O2 Device (Oxygen Therapy): room air     Intake/Output - Last 3 Shifts       11/03 0700 - 11/04 0659 11/04 0700 - 11/05 0659  11/05 0700 - 11/06 0659    IV Piggyback 3201      Total Intake(mL/kg) 3201 (33.1)      Urine (mL/kg/hr)  100 (0)     Stool  1     Total Output  101     Net +3201 -101            Urine Occurrence   1 x           Lines/Drains/Airways     Peripheral Intravenous Line                 Peripheral IV - Single Lumen 11/03/18 2115 Left Hand 1 day         Peripheral IV - Single Lumen 11/03/18 2130 Left Forearm 1 day                 STS Risk Score:n/a    Physical Exam   Constitutional: She is oriented to person, place, and time. She appears well-developed and well-nourished. No distress.   HENT:   Head: Normocephalic and atraumatic.   Mouth/Throat: Oropharynx is clear and moist.   Eyes: Conjunctivae and EOM are normal. Pupils are equal, round, and reactive to light.   Neck: No JVD present.   Cardiovascular: Normal rate, regular rhythm and normal heart sounds.   No murmur heard.  Pulmonary/Chest: Effort normal and breath sounds normal.   Abdominal: Soft. Bowel sounds are normal.   Musculoskeletal: She exhibits no edema or deformity.   Neurological: She is alert and oriented to person, place, and time.   Skin: Skin is warm and dry. She is not diaphoretic.   Psychiatric: She has a normal mood and affect.       Significant Labs:  All pertinent labs from the last 24 hours have been reviewed.    Significant Diagnostics:  I have reviewed all pertinent imaging results/findings within the past 24 hours.

## 2018-11-05 NOTE — ASSESSMENT & PLAN NOTE
Recent history of left lower extremity DVT.  CTA today unable to exclude PE.  Patient has been on apixaban at home, will hold.  Lovenox 1 mg/kg every 12 hours for now.

## 2018-11-05 NOTE — PT/OT/SLP PROGRESS
Physical Therapy  Treatment    Tri Staton   MRN: [id number]   Admitting Diagnosis: VTE (venous thromboembolism)    PT Received On: 11/05/18  PT Start Time: 1155     PT Stop Time: 1220    PT Total Time (min): 25 min       Billable Minutes:  Gait Training 10 and Therapeutic Activity 15    Treatment Type: Treatment  PT/PTA: PT             General Precautions: Standard, fall  Orthopedic Precautions: N/A   Braces: N/A         Subjective:  Communicated with Epic AND NURSE GUADALUPE prior to session.  PT AGREES TO THERAPY    Pain/Comfort  Pain Rating 1: 0/10    Objective:   Patient found with: telemetry    Functional Mobility:  Bed Mobility: JORDAN X1       Transfers: JORDAN X1       Gait: 22 FEET WITH JORDAN HHA IN ROOM, NO LOB, BUT SLIGHTLY UNSTEADY.  FAVORS LLE WITH DECREASED HEEL STRIKE DURING GAIT.    BALANCE IN SITTING IS FAIR+, AND IS FAIR IN STANDING.       Therapeutic Activities and Exercises:  PT SAT ON SIDE OF BED FOR A FEW MINUTES AND THEN ASSISTED TO SITTING IN BEDSIDE CHAIR.  SAT 7 MINUTES THEN AMBULTED IN ROOM WITH JORDAN, AND RETURNED TO SITTING OUT OF BED FOR LUNCH.  EXTENSIVE PT EDUCATION RE MOBILITY AND OUT OF BED FOR MEALS.     AM-PAC 6 CLICK MOBILITY  How much help from another person does this patient currently need?   1 = Unable, Total/Dependent Assistance  2 = A lot, Maximum/Moderate Assistance  3 = A little, Minimum/Contact Guard/Supervision  4 = None, Modified Reagan/Independent    Turning over in bed (including adjusting bedclothes, sheets and blankets)?: 3  Sitting down on and standing up from a chair with arms (e.g., wheelchair, bedside commode, etc.): 3  Moving from lying on back to sitting on the side of the bed?: 3  Moving to and from a bed to a chair (including a wheelchair)?: 3  Need to walk in hospital room?: 3  Climbing 3-5 steps with a railing?: 1  Basic Mobility Total Score: 16    AM-PAC Raw Score CMS G-Code Modifier Level of Impairment Assistance   6 % Total / Unable   7 - 9  CM 80 - 100% Maximal Assist   10 - 14 CL 60 - 80% Moderate Assist   15 - 19 CK 40 - 60% Moderate Assist   20 - 22 CJ 20 - 40% Minimal Assist   23 CI 1-20% SBA / CGA   24 CH 0% Independent/ Mod I     Patient left up in chair with all lines intact, call button in reach, NURSE notified and SISTER present.    Assessment:  Tri Staton is a 53 y.o. female with a medical diagnosis of VTE (venous thromboembolism) and presents with IMPAIRED MOBILITY AND WILL NEED CONT THERAPY.  PT IS AT RISK FOR FALLS IF SHE AMBULATED WITHOUT ASSISTANCE.    Rehab identified problem list/impairments: Rehab identified problem list/impairments: weakness, gait instability, impaired endurance, impaired balance, impaired functional mobilty, impaired coordination, abnormal tone    Rehab potential is good.    Activity tolerance: Good    Discharge recommendations: Discharge Facility/Level Of Care Needs: nursing facility, skilled     Barriers to discharge:  NONE    Equipment recommendations: Equipment Needed After Discharge: none     GOALS:   Multidisciplinary Problems     Physical Therapy Goals        Problem: Physical Therapy Goal    Goal Priority Disciplines Outcome Goal Variances Interventions   Physical Therapy Goal     PT, PT/OT Ongoing (interventions implemented as appropriate)                     PLAN:    Patient to be seen 5 x/week  to address the above listed problems via gait training, therapeutic activities, therapeutic exercises  Plan of Care expires: 11/12/18  Plan of Care reviewed with: patient, sibling    PT G-Codes  Score: 16    Mariam Bryant PT  11/05/2018

## 2018-11-05 NOTE — PROGRESS NOTES
[facility] Hospital Medicine  Progress Note    Patient Name: Tri Staton  MRN: [id number]  Patient Class: IP- Inpatient   Admission Date: 11/3/2018  Length of Stay: 0 days  Attending Physician: Lucas Rincon MD  Primary Care Provider: Primary Doctor No        Subjective:     Principal Problem:VTE (venous thromboembolism)    HPI:  Ms. Sttaon araceli  a 53-year-old  female with a history of CVA, with residual left-sided deficits, recently suffered an MI one month ago, had a stent placed, and at around the same time was diagnosed with left lower extremity DVT.  Patient has been on aspirin, apixaban, BB, ACEI and statin.  Patient reports compliance with medications..  Today she presented to the ED complaining of low-grade fever of 100.3 at home associated with increasing confusion per family members.  Patient also complains of nonproductive cough, worsening shortness of breath.  WBC and lactic acid are within normal limits.  CTA chest was done that could not exclude pulmonary embolism.  In spite of being on apixaban, and inability to exclude PE, patient was administered Lovenox 1 milligram/kilogram subcu x1 in the ED.  Blood cultures have been obtained.  Patient started on IV Rocephin and Zithromax empirically.  Discussed with multiple family members at the bedside, including patient's mother.    Hospital Course:  Admitted for evaluation and treatment of acute shortness of breath and chest pain.  Known DVT on anti-coagulation with Apixaban.  Started therapeutic Lovenox.  Unable to exclude small to moderate PE by CT angio of the chest.    Interval History:  Interval improvement in chest pain and shortness of breath.    Review of Systems   Constitutional: Negative for chills and fever.   HENT: Negative for congestion and sore throat.    Eyes: Negative for visual disturbance.   Respiratory: Positive for cough and shortness of breath. Negative for wheezing.    Cardiovascular:  Positive for chest pain. Negative for palpitations and leg swelling.   Gastrointestinal: Negative for abdominal pain, blood in stool, constipation, diarrhea, nausea and vomiting.   Genitourinary: Negative for dysuria and hematuria.   Musculoskeletal: Positive for arthralgias. Negative for back pain.   Skin: Negative for rash and wound.   Neurological: Negative for dizziness, weakness, light-headedness and numbness.   Hematological: Negative for adenopathy.     Objective:     Vital Signs (Most Recent):  Temp: 97.7 °F (36.5 °C) (11/04/18 1956)  Pulse: 100 (11/04/18 2032)  Resp: 20 (11/04/18 2032)  BP: (!) 121/58 (11/04/18 1956)  SpO2: 95 % (11/04/18 2032) Vital Signs (24h Range):  Temp:  [96.3 °F (35.7 °C)-99.2 °F (37.3 °C)] 97.7 °F (36.5 °C)  Pulse:  [] 100  Resp:  [16-27] 20  SpO2:  [92 %-99 %] 95 %  BP: ()/(56-78) 121/58     Weight: 96.7 kg (213 lb 3 oz)  Body mass index is 37.76 kg/m².    Intake/Output Summary (Last 24 hours) at 11/4/2018 2119  Last data filed at 11/4/2018 1300  Gross per 24 hour   Intake 3201 ml   Output 101 ml   Net 3100 ml      Physical Exam   Constitutional: She is oriented to person, place, and time. She appears well-developed and well-nourished. No distress.   HENT:   Head: Normocephalic and atraumatic.   Mouth/Throat: Oropharynx is clear and moist.   Eyes: Conjunctivae and EOM are normal. Pupils are equal, round, and reactive to light.   Neck: Neck supple. No JVD present. No thyromegaly present.   Cardiovascular: Normal rate and regular rhythm. Exam reveals no gallop and no friction rub.   No murmur heard.  Pulmonary/Chest: Effort normal and breath sounds normal. She has no wheezes. She has no rales.   Occasional crackles right lower lobe.   Abdominal: Soft. Bowel sounds are normal. She exhibits no distension. There is no tenderness. There is no rebound and no guarding.   Musculoskeletal: Normal range of motion. She exhibits no edema or deformity.   Tenderness left calf and  popliteal fossa.  No palpable cords or masses.   Lymphadenopathy:     She has no cervical adenopathy.   Neurological: She is alert and oriented to person, place, and time. She has normal reflexes.   Skin: Skin is warm and dry. No rash noted.   Psychiatric: She has a normal mood and affect. Her behavior is normal. Judgment and thought content normal.   Nursing note and vitals reviewed.      Significant Labs: All pertinent labs within the past 24 hours have been reviewed.    Significant Imaging: I have reviewed all pertinent imaging results/findings within the past 24 hours.    Assessment/Plan:      * VTE (venous thromboembolism)    Recent history of left lower extremity DVT.  CTA today unable to exclude PE.  Patient has been on apixaban at home, will hold.  Lovenox 1 mg/kg every 12 hours for now.     Acute bronchitis    Low-grade fever of 100.2, nonproductive cough.  Chest x-ray does not show any evidence of infiltrates.  IV Rocephin and Zithromax empirically.  Bronchodilators every 6 hr.     Essential hypertension    Continue current home medications.  Will monitor and make adjustments as needed.       CVA (cerebral vascular accident)    Recent history of a CVA with left-sided neurological deficits.  P.T./OT evaluation.  Aspirin daily.  Statin, Ace inhibitor daily.       Coronary artery disease of native artery of native heart with stable angina pectoris    Recent history of CAD per family.  Continue aspirin, Lovenox, statin, beta-blocker, ACE-inhibitor.  Troponin 0.029, 0.024. To 0.006.  Possible EKG changes of ischemia.  Consult Cardiology.       VTE Risk Mitigation (From admission, onward)        Ordered     enoxaparin injection 100 mg  Every 12 hours (non-standard times)      11/04/18 0159     Place sequential compression device  Until discontinued      11/04/18 0133              Lucas Rincon MD  Department of Hospital Medicine   [facility]

## 2018-11-05 NOTE — HPI
Ms Staton is 53 Years old female  Asked to see for abn Chest CT:   Admitted for Cough, fever 100.2 , wheezing  A history of CVA, with residual left-sided deficits, recently suffered an MI one month ago, had a stent placed, and at around the same time was diagnosed with left lower extremity DVT.    Patient has been on aspirin, apixaban, BB, ACEI and statin.  Patient reports compliance with medications..    Recent CVA with residual left sided weakness Aug 2018  Says get vaccination:Pneumovac and flu shot in [place]  Former smoker 1 PPD until recenty ( 5 years) and ETOH quit 9 months ago  Denies asthma or COPD  Hx of snoring. Denies apnea

## 2018-11-05 NOTE — HPI
Ms. Staton is a 53 year old female patient with a PMHx of CVA in 9/18, DVT, PE, HTN, recent MI, and hypercholesterolemia who presented to [facility] ED yesterday with a chief complaint of chest pain over x 2 days. Associated symptoms included subjective fever, non-productive cough, SOB, nausea, and wheezing. Patient denied any radiation of pain or any associated vomiting, diaphoresis, palpitations, near syncope, or syncope. Initial workup in ED revealed troponin of 0.029 and elevated liver enzymes. Patient subsequently admitted for further evaluation and treatment. Cardiology consulted to assist with management. Patient seen and examined today, resting in bed. Still complains of pleuritic chest discomfort, worse with sneezing, coughing, or yawning but states it has improved since admission. Denies any other cardiac complaints. Patient is compliant with her medications. She is followed on an OP basis by Dr. Weldon. Review of records shows she suffered an MI in [place] and had LHC which showed multivessel CAD, CABG recommended. Of note, she was scheduled to see Dr. Steele on 11/7/18 for evaluation. Chart reviewed. Troponin flat. EKG without acute ischemic changes. 2D echo pending.

## 2018-11-05 NOTE — PLAN OF CARE
Problem: Patient Care Overview  Goal: Plan of Care Review  Outcome: Ongoing (interventions implemented as appropriate)  Patient awake and alert free from falls and injury, sinus tachycardic on monitor, denies pain at this time, frequent weight shift assistance provided, POC reviewed with patient,  bed low locked, call light within reach, will continue to monitor

## 2018-11-05 NOTE — PLAN OF CARE
Problem: Patient Care Overview  Goal: Plan of Care Review  Outcome: Ongoing (interventions implemented as appropriate)  Patient lying in bed, family at bedside. She denies pain at this time, resp even/unlabored, no acute distress noted. She able to make needs known. Patient have some weakness in BLE, she requires assistance x 1 to ambulate within the room. No other concerns voiced at this time.

## 2018-11-06 ENCOUNTER — OFFICE VISIT (OUTPATIENT)
Dept: NEUROLOGY | Facility: CLINIC | Age: 53
DRG: 202 | End: 2018-11-06
Payer: MEDICAID

## 2018-11-06 VITALS
WEIGHT: 215.81 LBS | BODY MASS INDEX: 38.24 KG/M2 | SYSTOLIC BLOOD PRESSURE: 106 MMHG | DIASTOLIC BLOOD PRESSURE: 72 MMHG | HEART RATE: 82 BPM | HEIGHT: 63 IN

## 2018-11-06 DIAGNOSIS — E11.9 CONTROLLED TYPE 2 DIABETES MELLITUS WITHOUT COMPLICATION, WITHOUT LONG-TERM CURRENT USE OF INSULIN: ICD-10-CM

## 2018-11-06 DIAGNOSIS — I25.118 CORONARY ARTERY DISEASE OF NATIVE ARTERY OF NATIVE HEART WITH STABLE ANGINA PECTORIS: Primary | ICD-10-CM

## 2018-11-06 DIAGNOSIS — I25.118 CORONARY ARTERY DISEASE OF NATIVE ARTERY OF NATIVE HEART WITH STABLE ANGINA PECTORIS: ICD-10-CM

## 2018-11-06 DIAGNOSIS — I63.9 CEREBROVASCULAR ACCIDENT (CVA), UNSPECIFIED MECHANISM: ICD-10-CM

## 2018-11-06 DIAGNOSIS — I82.90 VTE (VENOUS THROMBOEMBOLISM): ICD-10-CM

## 2018-11-06 DIAGNOSIS — R29.818 SUSPECTED SLEEP APNEA: Primary | ICD-10-CM

## 2018-11-06 DIAGNOSIS — I10 ESSENTIAL HYPERTENSION: ICD-10-CM

## 2018-11-06 DIAGNOSIS — I69.30 LATE EFFECT OF STROKE: ICD-10-CM

## 2018-11-06 PROCEDURE — 99999 PR PBB SHADOW E&M-EST. PATIENT-LVL III: CPT | Mod: PBBFAC,,, | Performed by: PSYCHIATRY & NEUROLOGY

## 2018-11-06 PROCEDURE — 99213 OFFICE O/P EST LOW 20 MIN: CPT | Mod: PBBFAC | Performed by: PSYCHIATRY & NEUROLOGY

## 2018-11-06 PROCEDURE — 99204 OFFICE O/P NEW MOD 45 MIN: CPT | Mod: S$PBB,,, | Performed by: PSYCHIATRY & NEUROLOGY

## 2018-11-06 NOTE — LETTER
November 6, 2018      Christopher Weldon MD  [address] [facility] [id number] [place] [address] Neurology  [address] [place] [id number]  Phone: [phone]  Fax: [phone]          Patient: Tri Staton   MR Number: [id number]   YOB: 1965   Date of Visit: 11/6/2018       Dear Dr. Christopher Weldon:    Thank you for referring Tri Staton to me for evaluation. Attached you will find relevant portions of my assessment and plan of care.    If you have questions, please do not hesitate to call me. I look forward to following Tri Staton along with you.    Sincerely,    Maged Ghosh MD    Enclosure  CC:  No Recipients    If you would like to receive this communication electronically, please contact [email or web address] or [phone] to request more information on [employer] Link access.    For providers and/or their staff who would like to refer a patient to [facility], please contact us through our one-stop-shop provider referral line, [facility], at [phone].    If you feel you have received this communication in error or would no longer like to receive these types of communications, please e-mail [email or web address]

## 2018-11-06 NOTE — PROGRESS NOTES
Subjective:      Patient ID: Tri Staton is a 53 y.o. female.    Chief Complaint: She had a stroke several weeks ago    The patient's sister and mother are in the clinic with the patient today who is in a wheelchair but the patient is able to answer questions.  The patient apparently had a stroke while she was living in [place], [place] and was hospitalized for evaluation there.  The patient presented with acute onset of left-sided weakness and apparently diagnostic studies revealed the presence of a stroke.  She returned to wheezing and a to be closer to family members for further care.    Since returning to [place], she has had evidence of coronary artery disease as well as a recent small pulmonary embolism.  The patient has been referred to Neurology because of the history of the stroke with residual left-sided weakness.  According to the sister, the patient is able to do a stand pivot transfer.  According to review of medical records from her hospitalization recently, she is able to ambulate  With minimum assistance.  The patient is able to utilize a walker for ambulation.  She is able to come from sit to stand independently.  She is able to sit in her chair independently.  The patient's family indicates however that she has not been well motivated to follow through on recommendations from physical therapy.  While hospitalized, physical therapist had recommended skilled nursing facility for further therapy.    The patient indicates that today she has a slight cough which is nonproductive . She denies any chest pain.  The patient's family indicates that she is able to eat without threat of aspiration.  The patient denies to me any headache.  She does mention a sensation of dizziness when standing.  She is aware of her left-sided weakness but is not aware of any numbness or tingling on the left side.  The family has not noticed any significant change in speech.  The patient denies any visual  changes.          ROS:  GENERAL: NO FEVER, CHILLS,  OR WEIGHT LOSS.  SKIN: NO RASHES, ITCHING OR CHANGES IN COLOR OR TEXTURE OF SKIN.  HEAD: NO HEADACHES OR RECENT HEAD TRAUMA.  EYES: VISUAL ACUITY FINE. NO PHOTOPHOBIA, OCULAR PAIN OR DIPLOPIA.  EARS: DENIES EAR PAIN, DISCHARGE OR VERTIGO.  NOSE: NO LOSS OF SMELL, NO EPISTAXIS OR POSTNASAL DRIP.  MOUTH & THROAT: NO HOARSENESS OR CHANGE IN VOICE. NO EXCESSIVE GUM BLEEDING.  NODES: DENIES SWOLLEN GLANDS.  CHEST:   THE PATIENT HAS HAD A CHRONIC COUGH WITH SLIGHT SPUTUM PRODUCTION.  CARDIOVASCULAR: DENIES CHEST PAIN,  , BUT INDICATES THAT SHE IS NOT ABLE TO EXERCISE BECAUSE OF SHORTNESS OF BREATH.  ABDOMEN: APPETITE FINE. NO WEIGHT LOSS. DENIES DIARRHEA, ABDOMINAL PAIN, HEMATEMESIS OR BLOOD IN STOOL.  URINARY: NO FLANK PAIN, DYSURIA OR HEMATURIA.  PERIPHERAL VASCULAR: NO CLAUDICATION OR CYANOSIS.  MUSCULOSKELETAL: NO JOINT STIFFNESS OR SWELLING. DENIES BACK PAIN.  NEUROLOGIC: NO HISTORY OF SEIZURES,   OR UNEXPLAINED LOSS OF CONSCIOUSNESS.    Past Medical History:   Diagnosis Date    Coronary artery disease     Coronary artery disease of native artery of native heart with stable angina pectoris 2018    Hypercholesteremia     Hypertension     Neuropathy     Stroke      Past Surgical History:   Procedure Laterality Date     SECTION       Family History   Problem Relation Age of Onset    Hypertension Mother      Social History     Socioeconomic History    Marital status: Single     Spouse name: Not on file    Number of children: Not on file    Years of education: Not on file    Highest education level: Not on file   Social Needs    Financial resource strain: Not on file    Food insecurity - worry: Not on file    Food insecurity - inability: Not on file    Transportation needs - medical: Not on file    Transportation needs - non-medical: Not on file   Occupational History    Not on file   Tobacco Use    Smoking status: Former Smoker     Smokeless tobacco: Never Used   Substance and Sexual Activity    Alcohol use: No     Frequency: Never    Drug use: No    Sexual activity: No     Partners: Male   Other Topics Concern    Not on file   Social History Narrative    Not on file         Objective:   PE:   VITAL SIGNS:   Vitals:    11/06/18 0901   BP: 106/72   Pulse: 82     APPEARANCE: WELL NOURISHED, WELL DEVELOPED, IN NO ACUTE DISTRESS  AS SHE SITS IN HER WHEELCHAIR.   HEAD: NORMOCEPHALIC, ATRAUMATIC.  EYES: PERRL. EOMI.  NON-ICTERIC SCLERAE.    EARS: TM'S INTACT. LIGHT REFLEX NORMAL. NO RETRACTION OR PERFORATION.    NOSE: MUCOSA PINK. AIRWAY CLEAR.  MOUTH & THROAT: NO TONSILLAR ENLARGEMENT. NO PHARYNGEAL ERYTHEMA OR EXUDATE. NO STRIDOR.  NECK: SUPPLE. NO BRUITS.  CHEST: LUNGS CLEAR TO AUSCULTATION.  THE PATIENT HAS AN OCCASIONAL COUGH BUT IS UNABLE TO PRODUCE ANY SPUTUM.  CARDIOVASCULAR: REGULAR RHYTHM WITHOUT SIGNIFICANT MURMURS.  ABDOMEN: BOWEL SOUNDS NORMAL. NOT DISTENDED.   MUSCULOSKELETAL:  NO BONY DEFORMITY SEEN.  MUSCLE TONE   AND MUSCLE MASS ARE NORMAL IN BOTH UPPER AND BOTH LOWER EXTREMITIES.    NEUROLOGIC:   MENTAL STATUS:  THE PATIENT IS WELL ORIENTED TO PERSON, TIME, PLACE, AND SITUATION.  THE PATIENT IS ATTENTIVE TO THE ENVIRONMENT AND COOPERATIVE FOR THE EXAM.  CRANIAL NERVES: II-XII GROSSLY INTACT. FUNDOSCOPIC EXAM IS NORMAL.  NO HEMORRHAGE, EXUDATE OR PAPILLEDEMA IS PRESENT. THE EXTRAOCULAR MUSCLES ARE INTACT IN THE CARDINAL DIRECTIONS OF GAZE.  NO PTOSIS IS PRESENT. FACIAL FEATURES ARE SYMMETRICAL.  SPEECH IS NORMAL IN FLUENCY, DICTION, AND PHRASING.  TONGUE PROTRUDES IN THE MIDLINE.    GAIT AND STATION:    THE PATIENT WAS ABLE TO COME FROM SIT TO STAND INDEPENDENTLY FROM HER WHEELCHAIR BY PUSHING OFF ON THE ARMS OF THE WHEELCHAIR.  SHE IS ABLE TO MAINTAIN HER STANDING BALANCE.  SHE WAS ABLE TO TAKE SEVERAL STEPS IN THE EXAMINING ROOM WITH HAND-HELD ASSIST.  MOTOR:   THE PATIENT EXHIBITS A LEFT-SIDED WEAKNESS WITH A DOWN DRIFT TO  THE OUTSTRETCHED LEFT ARM, DIMINISHED  STRENGTH OF THE LEFT HAND, AND DIMINISHED KNEE EXTENSION AND KNEE FLEXION ON THE LEFT.  MUSCLE GROUPS ON THE LEFT ARE GENERALLY GRADED 3/5 COMPARED TO 05/05 ON THE RIGHT.  SENSORY:  INTACT BOTH UPPER AND LOWER EXTREMITIES TO PIN PRICK, TOUCH, AND VIBRATION.  THE PATIENT WAS ABLE TO DISCRIMINATE DOUBLE SIMULTANEOUS SENSORY PRESENTATION WITHOUT EXTINCTION OF STIMULUS ON THE LEFT SIDE.  CEREBELLAR:  FINGER TO NOSE DONE WELL  ON THE RIGHT BUT WAS SOMEWHAT AWKWARD ON THE LEFT.ALTERNATING MOVEMENTS INTACT  ON THE RIGHT BUT COULD NOT BE PERFORMED WELL ON THE LEFT. NO INVOLUNTARY MOVEMENTS OR TREMOR SEEN.  REFLEXES:  STRETCH REFLEXES ARE 2+ BOTH UPPER AND LOWER EXTREMITIES.  PLANTAR STIMULATION IS  EXTENSOR ON THE LEFT.           Assessment:     Encounter Diagnoses   Name Primary?    Late effect of stroke     Coronary artery disease of native artery of native heart with stable angina pectoris Yes    VTE (venous thromboembolism)     Controlled type 2 diabetes mellitus without complication, without long-term current use of insulin        Plan:     1.  THE PATIENT IS TO CONTINUE MEDICATIONS AS PREVIOUSLY PRESCRIBED.  2. AMBULATORY REFERRAL TO PHYSICAL THERAPY/OCCUPATIONAL THERAPY IF AVAILABLE TO THE PATIENT.  UNDER IDEAL CIRCUMSTANCES HOWEVER, INPATIENT REHABILITATION WOULD PROBABLY BE MORE EFFECTIVE FOR THE PATIENT.  HOWEVER, BECAUSE OF HER INSURANCE COVERAGE THIS MAY NOT BE AVAILABLE TO HER.  3. ROUTINE FOLLOW-UP WITH NEUROLOGY IN 3 MONTHS AND ROUTINE FOLLOW-UP WITH CARDIOLOGY AND PRIMARY CARE AS ALREADY SCHEDULED.      This was a 55 min visit with the patient, her mother, and sister with over 50% time spent counseling the patient regarding the need for active and aggressive physical therapy and occupational therapy.  This note is generated with speech recognition software and is subject to transcription error and sound alike phrases that may be missed by proofreading.

## 2018-11-09 DIAGNOSIS — I25.118 CORONARY ARTERY DISEASE OF NATIVE ARTERY OF NATIVE HEART WITH STABLE ANGINA PECTORIS: Primary | ICD-10-CM

## 2018-11-09 LAB
BACTERIA BLD CULT: NORMAL
BACTERIA BLD CULT: NORMAL

## 2018-11-09 NOTE — TELEPHONE ENCOUNTER
----- Message from Shireen Bryan sent at 11/9/2018  2:43 PM CST -----  Contact: pt  1. What is the name of the medication you are requesting? Nifedine/blood pressure  2. What is the dose? 30mg  3. How do you take the medication? Orally, topically, etc? orally  4. How often do you take this medication? 1xday  5. Do you need a 30 day or 90 day supply? 30  6. How many refills are you requesting? 1  7. What is your preferred pharmacy and location of the pharmacy? [facility]  8. Who can we contact with further questions? Pt @ [phone]

## 2018-11-10 RX ORDER — NITROGLYCERIN 0.4 MG/1
0.4 TABLET SUBLINGUAL EVERY 5 MIN PRN
Qty: 25 TABLET | Refills: 3 | Status: SHIPPED | OUTPATIENT
Start: 2018-11-10 | End: 2019-11-21 | Stop reason: SDUPTHER

## 2018-11-12 RX ORDER — NIFEDIPINE 30 MG/1
30 TABLET, FILM COATED, EXTENDED RELEASE ORAL DAILY
Qty: 30 TABLET | Refills: 3 | Status: SHIPPED | OUTPATIENT
Start: 2018-11-12 | End: 2019-03-21 | Stop reason: ALTCHOICE

## 2018-11-19 RX ORDER — HYDRALAZINE HYDROCHLORIDE 25 MG/1
25 TABLET, FILM COATED ORAL 3 TIMES DAILY
Qty: 90 TABLET | Refills: 11 | Status: ON HOLD | OUTPATIENT
Start: 2018-11-19 | End: 2018-11-27 | Stop reason: HOSPADM

## 2018-11-19 RX ORDER — ISOSORBIDE MONONITRATE 60 MG/1
60 TABLET, EXTENDED RELEASE ORAL 2 TIMES DAILY
Qty: 60 TABLET | Refills: 3 | Status: SHIPPED | OUTPATIENT
Start: 2018-11-19 | End: 2018-12-10 | Stop reason: SDUPTHER

## 2018-11-19 NOTE — TELEPHONE ENCOUNTER
Spoke with pt and she will call her PCP for that oxybutynin.      ----- Message from Sagar Redman RN sent at 11/19/2018  3:00 PM CST -----  I am not sure who this patient is.  You may have sent this to me by mistake.  I do not have the licensure to write or refill prescriptions.  ----- Message -----  From: Kate Jane MA  Sent: 11/19/2018  11:59 AM  To: Sagar Redman RN    Pt is requesting Oxybutynin refill.  We have never prescribed this.

## 2018-11-19 NOTE — TELEPHONE ENCOUNTER
----- Message from Gildardo Rascon sent at 11/19/2018 11:22 AM CST -----  Contact: pt   Pt needs refill     isosorbicle 60 mg / 30 days     Hydralazine 25 mg / 30 days     Oxybitynn 5 mg / 30 days      ..[phone] (home)          ..  [id number] - [place] - [address] [facility] AT [address]  [place] [address]  Phone: [phone] Fax: [phone]

## 2018-11-23 ENCOUNTER — HOSPITAL ENCOUNTER (INPATIENT)
Facility: HOSPITAL | Age: 53
LOS: 4 days | Discharge: HOME | DRG: 864 | End: 2018-11-27
Attending: EMERGENCY MEDICINE | Admitting: HOSPITALIST
Payer: MEDICAID

## 2018-11-23 DIAGNOSIS — R65.10 SIRS (SYSTEMIC INFLAMMATORY RESPONSE SYNDROME): ICD-10-CM
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
## 2018-11-23 DIAGNOSIS — R07.9 CHEST PAIN: ICD-10-CM

## 2018-11-23 DIAGNOSIS — I31.39 PERICARDIAL EFFUSION: ICD-10-CM

## 2018-11-23 DIAGNOSIS — I25.118 CORONARY ARTERY DISEASE OF NATIVE ARTERY OF NATIVE HEART WITH STABLE ANGINA PECTORIS: Primary | ICD-10-CM

## 2018-11-23 DIAGNOSIS — R53.1 WEAKNESS: ICD-10-CM

## 2018-11-23 LAB
ANION GAP SERPL CALC-SCNC: 9 MMOL/L
BASOPHILS # BLD AUTO: 0.02 K/UL
BASOPHILS NFR BLD: 0.2 %
BILIRUB UR QL STRIP: NEGATIVE
BUN SERPL-MCNC: 20 MG/DL
CALCIUM SERPL-MCNC: 8.9 MG/DL
CHLORIDE SERPL-SCNC: 105 MMOL/L
CLARITY UR: ABNORMAL
CO2 SERPL-SCNC: 21 MMOL/L
COLOR UR: YELLOW
CREAT SERPL-MCNC: 1.4 MG/DL
DIFFERENTIAL METHOD: ABNORMAL
EOSINOPHIL # BLD AUTO: 0.1 K/UL
EOSINOPHIL NFR BLD: 0.6 %
ERYTHROCYTE [DISTWIDTH] IN BLOOD BY AUTOMATED COUNT: 15.1 %
EST. GFR  (AFRICAN AMERICAN): 49 ML/MIN/1.73 M^2
EST. GFR  (NON AFRICAN AMERICAN): 42 ML/MIN/1.73 M^2
GLUCOSE SERPL-MCNC: 110 MG/DL
GLUCOSE UR QL STRIP: NEGATIVE
HCT VFR BLD AUTO: 33.1 %
HGB BLD-MCNC: 11.2 G/DL
HGB UR QL STRIP: ABNORMAL
HYALINE CASTS #/AREA URNS LPF: 1 /LPF
INFLUENZA A, MOLECULAR: NEGATIVE
INFLUENZA B, MOLECULAR: NEGATIVE
KETONES UR QL STRIP: ABNORMAL
LACTATE SERPL-SCNC: 1.4 MMOL/L
LEUKOCYTE ESTERASE UR QL STRIP: ABNORMAL
LYMPHOCYTES # BLD AUTO: 2 K/UL
LYMPHOCYTES NFR BLD: 22.7 %
MCH RBC QN AUTO: 30.4 PG
MCHC RBC AUTO-ENTMCNC: 33.8 G/DL
MCV RBC AUTO: 90 FL
MICROSCOPIC COMMENT: NORMAL
MONOCYTES # BLD AUTO: 0.9 K/UL
MONOCYTES NFR BLD: 10.2 %
NEUTROPHILS # BLD AUTO: 6 K/UL
NEUTROPHILS NFR BLD: 66.3 %
NITRITE UR QL STRIP: NEGATIVE
PH UR STRIP: 6 [PH] (ref 5–8)
PLATELET # BLD AUTO: 242 K/UL
PMV BLD AUTO: 10.5 FL
POTASSIUM SERPL-SCNC: 4.4 MMOL/L
PROCALCITONIN SERPL IA-MCNC: 0.46 NG/ML
PROT UR QL STRIP: ABNORMAL
RBC # BLD AUTO: 3.68 M/UL
RBC #/AREA URNS HPF: 4 /HPF (ref 0–4)
SODIUM SERPL-SCNC: 135 MMOL/L
SP GR UR STRIP: 1.01 (ref 1–1.03)
SPECIMEN SOURCE: NORMAL
SQUAMOUS #/AREA URNS HPF: 1 /HPF
TROPONIN I SERPL DL<=0.01 NG/ML-MCNC: 0.06 NG/ML
URN SPEC COLLECT METH UR: ABNORMAL
UROBILINOGEN UR STRIP-ACNC: 1 EU/DL
WBC # BLD AUTO: 8.96 K/UL
WBC #/AREA URNS HPF: 1 /HPF (ref 0–5)

## 2018-11-23 PROCEDURE — 96367 TX/PROPH/DG ADDL SEQ IV INF: CPT

## 2018-11-23 PROCEDURE — 25000003 PHARM REV CODE 250: Performed by: EMERGENCY MEDICINE

## 2018-11-23 PROCEDURE — 96365 THER/PROPH/DIAG IV INF INIT: CPT

## 2018-11-23 PROCEDURE — 84484 ASSAY OF TROPONIN QUANT: CPT

## 2018-11-23 PROCEDURE — 11000001 HC ACUTE MED/SURG PRIVATE ROOM

## 2018-11-23 PROCEDURE — 80048 BASIC METABOLIC PNL TOTAL CA: CPT

## 2018-11-23 PROCEDURE — 96366 THER/PROPH/DIAG IV INF ADDON: CPT

## 2018-11-23 PROCEDURE — 93005 ELECTROCARDIOGRAM TRACING: CPT

## 2018-11-23 PROCEDURE — 63600175 PHARM REV CODE 636 W HCPCS: Performed by: EMERGENCY MEDICINE

## 2018-11-23 PROCEDURE — 87040 BLOOD CULTURE FOR BACTERIA: CPT | Mod: 59

## 2018-11-23 PROCEDURE — 81000 URINALYSIS NONAUTO W/SCOPE: CPT

## 2018-11-23 PROCEDURE — 83605 ASSAY OF LACTIC ACID: CPT

## 2018-11-23 PROCEDURE — 85025 COMPLETE CBC W/AUTO DIFF WBC: CPT

## 2018-11-23 PROCEDURE — 93010 ELECTROCARDIOGRAM REPORT: CPT | Mod: ,,, | Performed by: INTERNAL MEDICINE

## 2018-11-23 PROCEDURE — 96360 HYDRATION IV INFUSION INIT: CPT | Mod: 59

## 2018-11-23 PROCEDURE — 84145 PROCALCITONIN (PCT): CPT

## 2018-11-23 PROCEDURE — 96375 TX/PRO/DX INJ NEW DRUG ADDON: CPT

## 2018-11-23 PROCEDURE — 99291 CRITICAL CARE FIRST HOUR: CPT | Mod: 25

## 2018-11-23 PROCEDURE — 87502 INFLUENZA DNA AMP PROBE: CPT

## 2018-11-23 PROCEDURE — 96361 HYDRATE IV INFUSION ADD-ON: CPT

## 2018-11-23 RX ORDER — ACETAMINOPHEN 500 MG
1000 TABLET ORAL
Status: COMPLETED | OUTPATIENT
Start: 2018-11-23 | End: 2018-11-23

## 2018-11-23 RX ORDER — MORPHINE SULFATE 4 MG/ML
4 INJECTION, SOLUTION INTRAMUSCULAR; INTRAVENOUS
Status: COMPLETED | OUTPATIENT
Start: 2018-11-23 | End: 2018-11-23

## 2018-11-23 RX ORDER — VANCOMYCIN HCL IN 5 % DEXTROSE 1G/250ML
1000 PLASTIC BAG, INJECTION (ML) INTRAVENOUS
Status: COMPLETED | OUTPATIENT
Start: 2018-11-23 | End: 2018-11-23

## 2018-11-23 RX ORDER — SODIUM CHLORIDE 9 MG/ML
500 INJECTION, SOLUTION INTRAVENOUS
Status: COMPLETED | OUTPATIENT
Start: 2018-11-24 | End: 2018-11-24

## 2018-11-23 RX ORDER — ONDANSETRON 2 MG/ML
4 INJECTION INTRAMUSCULAR; INTRAVENOUS
Status: COMPLETED | OUTPATIENT
Start: 2018-11-23 | End: 2018-11-23

## 2018-11-23 RX ORDER — IBUPROFEN 800 MG/1
800 TABLET ORAL
Status: COMPLETED | OUTPATIENT
Start: 2018-11-23 | End: 2018-11-23

## 2018-11-23 RX ADMIN — VANCOMYCIN HYDROCHLORIDE 1000 MG: 1 INJECTION, POWDER, LYOPHILIZED, FOR SOLUTION INTRAVENOUS at 10:11

## 2018-11-23 RX ADMIN — MORPHINE SULFATE 4 MG: 4 INJECTION, SOLUTION INTRAMUSCULAR; INTRAVENOUS at 07:11

## 2018-11-23 RX ADMIN — ACETAMINOPHEN 1000 MG: 500 TABLET ORAL at 07:11

## 2018-11-23 RX ADMIN — SODIUM CHLORIDE 500 ML: 0.9 INJECTION, SOLUTION INTRAVENOUS at 11:11

## 2018-11-23 RX ADMIN — PIPERACILLIN AND TAZOBACTAM 4.5 G: 4; .5 INJECTION, POWDER, LYOPHILIZED, FOR SOLUTION INTRAVENOUS; PARENTERAL at 09:11

## 2018-11-23 RX ADMIN — IBUPROFEN 800 MG: 800 TABLET ORAL at 07:11

## 2018-11-23 RX ADMIN — SODIUM CHLORIDE 1000 ML: 0.9 INJECTION, SOLUTION INTRAVENOUS at 07:11

## 2018-11-23 RX ADMIN — ONDANSETRON 4 MG: 2 INJECTION INTRAMUSCULAR; INTRAVENOUS at 07:11

## 2018-11-24 PROBLEM — N17.9 AKI (ACUTE KIDNEY INJURY): Status: ACTIVE | Noted: 2018-11-24

## 2018-11-24 PROBLEM — I69.90 LATE EFFECTS OF CVA (CEREBROVASCULAR ACCIDENT): Status: ACTIVE | Noted: 2018-11-06

## 2018-11-24 PROBLEM — D68.59 HYPERCOAGULABLE STATE: Status: ACTIVE | Noted: 2018-11-24

## 2018-11-24 PROBLEM — R65.10 SIRS (SYSTEMIC INFLAMMATORY RESPONSE SYNDROME): Status: ACTIVE | Noted: 2018-11-24

## 2018-11-24 LAB
ANION GAP SERPL CALC-SCNC: 9 MMOL/L
BASOPHILS # BLD AUTO: 0.01 K/UL
BASOPHILS NFR BLD: 0.2 %
BUN SERPL-MCNC: 21 MG/DL
CALCIUM SERPL-MCNC: 8.6 MG/DL
CHLORIDE SERPL-SCNC: 109 MMOL/L
CHOLEST SERPL-MCNC: 80 MG/DL
CHOLEST/HDLC SERPL: 3.6 {RATIO}
CO2 SERPL-SCNC: 19 MMOL/L
CREAT SERPL-MCNC: 1.2 MG/DL
DIASTOLIC DYSFUNCTION: NO
DIFFERENTIAL METHOD: ABNORMAL
EOSINOPHIL # BLD AUTO: 0.1 K/UL
EOSINOPHIL NFR BLD: 2.3 %
ERYTHROCYTE [DISTWIDTH] IN BLOOD BY AUTOMATED COUNT: 15.3 %
EST. GFR  (AFRICAN AMERICAN): 59 ML/MIN/1.73 M^2
EST. GFR  (NON AFRICAN AMERICAN): 51 ML/MIN/1.73 M^2
ESTIMATED AVG GLUCOSE: 97 MG/DL
GLOBAL PERICARDIAL EFFUSION: ABNORMAL
GLUCOSE SERPL-MCNC: 71 MG/DL
HBA1C MFR BLD HPLC: 5 %
HCT VFR BLD AUTO: 39 %
HDLC SERPL-MCNC: 22 MG/DL
HDLC SERPL: 27.5 %
HGB BLD-MCNC: 12.8 G/DL
LDLC SERPL CALC-MCNC: 42.8 MG/DL
LYMPHOCYTES # BLD AUTO: 0.8 K/UL
LYMPHOCYTES NFR BLD: 16.1 %
MAGNESIUM SERPL-MCNC: 1.8 MG/DL
MCH RBC QN AUTO: 30.4 PG
MCHC RBC AUTO-ENTMCNC: 32.8 G/DL
MCV RBC AUTO: 93 FL
MONOCYTES # BLD AUTO: 0.2 K/UL
MONOCYTES NFR BLD: 4.1 %
NEUTROPHILS # BLD AUTO: 4 K/UL
NEUTROPHILS NFR BLD: 77.3 %
NONHDLC SERPL-MCNC: 58 MG/DL
PHOSPHATE SERPL-MCNC: 4.3 MG/DL
PLATELET # BLD AUTO: 199 K/UL
PMV BLD AUTO: 10.4 FL
POTASSIUM SERPL-SCNC: 4.3 MMOL/L
RBC # BLD AUTO: 4.21 M/UL
RETIRED EF AND QEF - SEE NOTES: 60 (ref 55–65)
SODIUM SERPL-SCNC: 137 MMOL/L
TRIGL SERPL-MCNC: 76 MG/DL
TROPONIN I SERPL DL<=0.01 NG/ML-MCNC: 0.05 NG/ML
TSH SERPL DL<=0.005 MIU/L-ACNC: 0.6 UIU/ML
WBC # BLD AUTO: 5.15 K/UL

## 2018-11-24 PROCEDURE — 25000003 PHARM REV CODE 250: Performed by: NURSE PRACTITIONER

## 2018-11-24 PROCEDURE — 85025 COMPLETE CBC W/AUTO DIFF WBC: CPT

## 2018-11-24 PROCEDURE — 84484 ASSAY OF TROPONIN QUANT: CPT

## 2018-11-24 PROCEDURE — 83036 HEMOGLOBIN GLYCOSYLATED A1C: CPT

## 2018-11-24 PROCEDURE — 83735 ASSAY OF MAGNESIUM: CPT

## 2018-11-24 PROCEDURE — 93307 TTE W/O DOPPLER COMPLETE: CPT

## 2018-11-24 PROCEDURE — 84100 ASSAY OF PHOSPHORUS: CPT

## 2018-11-24 PROCEDURE — 80048 BASIC METABOLIC PNL TOTAL CA: CPT

## 2018-11-24 PROCEDURE — 63600175 PHARM REV CODE 636 W HCPCS: Performed by: HOSPITALIST

## 2018-11-24 PROCEDURE — 94760 N-INVAS EAR/PLS OXIMETRY 1: CPT

## 2018-11-24 PROCEDURE — 21400001 HC TELEMETRY ROOM

## 2018-11-24 PROCEDURE — 36415 COLL VENOUS BLD VENIPUNCTURE: CPT

## 2018-11-24 PROCEDURE — 25000003 PHARM REV CODE 250: Performed by: EMERGENCY MEDICINE

## 2018-11-24 PROCEDURE — 80061 LIPID PANEL: CPT

## 2018-11-24 PROCEDURE — 84443 ASSAY THYROID STIM HORMONE: CPT

## 2018-11-24 PROCEDURE — 96361 HYDRATE IV INFUSION ADD-ON: CPT

## 2018-11-24 PROCEDURE — 25500020 PHARM REV CODE 255: Performed by: EMERGENCY MEDICINE

## 2018-11-24 PROCEDURE — A4216 STERILE WATER/SALINE, 10 ML: HCPCS | Performed by: NURSE PRACTITIONER

## 2018-11-24 PROCEDURE — 25000003 PHARM REV CODE 250: Performed by: HOSPITALIST

## 2018-11-24 PROCEDURE — 93307 TTE W/O DOPPLER COMPLETE: CPT | Mod: 26,,, | Performed by: INTERNAL MEDICINE

## 2018-11-24 RX ORDER — ONDANSETRON 2 MG/ML
4 INJECTION INTRAMUSCULAR; INTRAVENOUS EVERY 8 HOURS PRN
Status: DISCONTINUED | OUTPATIENT
Start: 2018-11-25 | End: 2018-11-27 | Stop reason: HOSPADM

## 2018-11-24 RX ORDER — PANTOPRAZOLE SODIUM 40 MG/1
40 TABLET, DELAYED RELEASE ORAL DAILY
Status: DISCONTINUED | OUTPATIENT
Start: 2018-11-24 | End: 2018-11-27 | Stop reason: HOSPADM

## 2018-11-24 RX ORDER — SODIUM CHLORIDE 9 MG/ML
INJECTION, SOLUTION INTRAVENOUS CONTINUOUS
Status: DISCONTINUED | OUTPATIENT
Start: 2018-11-24 | End: 2018-11-25

## 2018-11-24 RX ORDER — GLUCAGON 1 MG
1 KIT INJECTION
Status: DISCONTINUED | OUTPATIENT
Start: 2018-11-24 | End: 2018-11-27 | Stop reason: HOSPADM

## 2018-11-24 RX ORDER — VANCOMYCIN HCL IN 5 % DEXTROSE 1G/250ML
1000 PLASTIC BAG, INJECTION (ML) INTRAVENOUS
Status: DISCONTINUED | OUTPATIENT
Start: 2018-11-25 | End: 2018-11-26

## 2018-11-24 RX ORDER — NIFEDIPINE 30 MG/1
30 TABLET, EXTENDED RELEASE ORAL DAILY
Status: DISCONTINUED | OUTPATIENT
Start: 2018-11-24 | End: 2018-11-27 | Stop reason: HOSPADM

## 2018-11-24 RX ORDER — NITROGLYCERIN 0.4 MG/1
0.4 TABLET SUBLINGUAL EVERY 5 MIN PRN
Status: DISCONTINUED | OUTPATIENT
Start: 2018-11-24 | End: 2018-11-27 | Stop reason: HOSPADM

## 2018-11-24 RX ORDER — GUAIFENESIN/DEXTROMETHORPHAN 100-10MG/5
5 SYRUP ORAL EVERY 4 HOURS PRN
Status: DISCONTINUED | OUTPATIENT
Start: 2018-11-24 | End: 2018-11-27 | Stop reason: HOSPADM

## 2018-11-24 RX ORDER — ACETAMINOPHEN 325 MG/1
650 TABLET ORAL EVERY 4 HOURS PRN
Status: DISCONTINUED | OUTPATIENT
Start: 2018-11-24 | End: 2018-11-27 | Stop reason: HOSPADM

## 2018-11-24 RX ORDER — ISOSORBIDE MONONITRATE 60 MG/1
60 TABLET, EXTENDED RELEASE ORAL 2 TIMES DAILY
Status: DISCONTINUED | OUTPATIENT
Start: 2018-11-24 | End: 2018-11-27 | Stop reason: HOSPADM

## 2018-11-24 RX ORDER — ASPIRIN 81 MG/1
81 TABLET ORAL DAILY
Status: DISCONTINUED | OUTPATIENT
Start: 2018-11-24 | End: 2018-11-27 | Stop reason: HOSPADM

## 2018-11-24 RX ORDER — ATORVASTATIN CALCIUM 40 MG/1
80 TABLET, FILM COATED ORAL DAILY
Status: DISCONTINUED | OUTPATIENT
Start: 2018-11-24 | End: 2018-11-27 | Stop reason: HOSPADM

## 2018-11-24 RX ORDER — METOPROLOL SUCCINATE 25 MG/1
25 TABLET, EXTENDED RELEASE ORAL DAILY
Status: DISCONTINUED | OUTPATIENT
Start: 2018-11-24 | End: 2018-11-26

## 2018-11-24 RX ORDER — IBUPROFEN 200 MG
16 TABLET ORAL
Status: DISCONTINUED | OUTPATIENT
Start: 2018-11-24 | End: 2018-11-27 | Stop reason: HOSPADM

## 2018-11-24 RX ORDER — GUAIFENESIN/DEXTROMETHORPHAN 100-10MG/5
10 SYRUP ORAL ONCE
Status: DISCONTINUED | OUTPATIENT
Start: 2018-11-24 | End: 2018-11-24

## 2018-11-24 RX ORDER — LISINOPRIL 20 MG/1
40 TABLET ORAL DAILY
Status: DISCONTINUED | OUTPATIENT
Start: 2018-11-24 | End: 2018-11-24

## 2018-11-24 RX ORDER — FUROSEMIDE 10 MG/ML
40 INJECTION INTRAMUSCULAR; INTRAVENOUS 2 TIMES DAILY
Status: DISCONTINUED | OUTPATIENT
Start: 2018-11-24 | End: 2018-11-27 | Stop reason: HOSPADM

## 2018-11-24 RX ORDER — AMITRIPTYLINE HYDROCHLORIDE 50 MG/1
50 TABLET, FILM COATED ORAL NIGHTLY
Status: DISCONTINUED | OUTPATIENT
Start: 2018-11-24 | End: 2018-11-27 | Stop reason: HOSPADM

## 2018-11-24 RX ORDER — PROMETHAZINE HYDROCHLORIDE 25 MG/1
25 TABLET ORAL EVERY 6 HOURS PRN
Status: DISCONTINUED | OUTPATIENT
Start: 2018-11-24 | End: 2018-11-24

## 2018-11-24 RX ORDER — SODIUM CHLORIDE 9 MG/ML
1000 INJECTION, SOLUTION INTRAVENOUS
Status: DISCONTINUED | OUTPATIENT
Start: 2018-11-24 | End: 2018-11-24

## 2018-11-24 RX ORDER — CALCIUM CARBONATE 200(500)MG
500 TABLET,CHEWABLE ORAL DAILY PRN
Status: DISCONTINUED | OUTPATIENT
Start: 2018-11-24 | End: 2018-11-27 | Stop reason: HOSPADM

## 2018-11-24 RX ORDER — OXYBUTYNIN CHLORIDE 5 MG/1
5 TABLET ORAL 2 TIMES DAILY
Status: DISCONTINUED | OUTPATIENT
Start: 2018-11-24 | End: 2018-11-27 | Stop reason: HOSPADM

## 2018-11-24 RX ORDER — GABAPENTIN 300 MG/1
600 CAPSULE ORAL 3 TIMES DAILY PRN
Status: DISCONTINUED | OUTPATIENT
Start: 2018-11-24 | End: 2018-11-27 | Stop reason: HOSPADM

## 2018-11-24 RX ORDER — SODIUM CHLORIDE 0.9 % (FLUSH) 0.9 %
3 SYRINGE (ML) INJECTION EVERY 8 HOURS
Status: DISCONTINUED | OUTPATIENT
Start: 2018-11-24 | End: 2018-11-27 | Stop reason: HOSPADM

## 2018-11-24 RX ORDER — GUAIFENESIN/DEXTROMETHORPHAN 100-10MG/5
10 SYRUP ORAL ONCE
Status: COMPLETED | OUTPATIENT
Start: 2018-11-24 | End: 2018-11-24

## 2018-11-24 RX ORDER — IBUPROFEN 200 MG
24 TABLET ORAL
Status: DISCONTINUED | OUTPATIENT
Start: 2018-11-24 | End: 2018-11-27 | Stop reason: HOSPADM

## 2018-11-24 RX ORDER — POLYETHYLENE GLYCOL 3350 17 G/17G
17 POWDER, FOR SOLUTION ORAL DAILY
Status: DISCONTINUED | OUTPATIENT
Start: 2018-11-24 | End: 2018-11-27 | Stop reason: HOSPADM

## 2018-11-24 RX ORDER — DOCUSATE SODIUM 100 MG/1
100 CAPSULE, LIQUID FILLED ORAL 2 TIMES DAILY PRN
Status: DISCONTINUED | OUTPATIENT
Start: 2018-11-24 | End: 2018-11-27 | Stop reason: HOSPADM

## 2018-11-24 RX ORDER — SODIUM CHLORIDE 0.9 % (FLUSH) 0.9 %
5 SYRINGE (ML) INJECTION
Status: DISCONTINUED | OUTPATIENT
Start: 2018-11-24 | End: 2018-11-27 | Stop reason: HOSPADM

## 2018-11-24 RX ORDER — HYDRALAZINE HYDROCHLORIDE 25 MG/1
25 TABLET, FILM COATED ORAL 3 TIMES DAILY
Status: DISCONTINUED | OUTPATIENT
Start: 2018-11-24 | End: 2018-11-26

## 2018-11-24 RX ADMIN — POLYETHYLENE GLYCOL 3350 17 G: 17 POWDER, FOR SOLUTION ORAL at 10:11

## 2018-11-24 RX ADMIN — APIXABAN 5 MG: 2.5 TABLET, FILM COATED ORAL at 10:11

## 2018-11-24 RX ADMIN — AMITRIPTYLINE HYDROCHLORIDE 50 MG: 50 TABLET, FILM COATED ORAL at 09:11

## 2018-11-24 RX ADMIN — ACETAMINOPHEN 650 MG: 325 TABLET ORAL at 07:11

## 2018-11-24 RX ADMIN — OXYBUTYNIN CHLORIDE 5 MG: 5 TABLET ORAL at 09:11

## 2018-11-24 RX ADMIN — HYDRALAZINE HYDROCHLORIDE 25 MG: 25 TABLET, FILM COATED ORAL at 09:11

## 2018-11-24 RX ADMIN — FUROSEMIDE 40 MG: 10 INJECTION, SOLUTION INTRAMUSCULAR; INTRAVENOUS at 09:11

## 2018-11-24 RX ADMIN — CALCIUM CARBONATE (ANTACID) CHEW TAB 500 MG 500 MG: 500 CHEW TAB at 09:11

## 2018-11-24 RX ADMIN — SODIUM CHLORIDE 500 ML: 0.9 INJECTION, SOLUTION INTRAVENOUS at 01:11

## 2018-11-24 RX ADMIN — ISOSORBIDE MONONITRATE 60 MG: 60 TABLET, EXTENDED RELEASE ORAL at 09:11

## 2018-11-24 RX ADMIN — ASPIRIN 81 MG: 81 TABLET, COATED ORAL at 10:11

## 2018-11-24 RX ADMIN — ATORVASTATIN CALCIUM 80 MG: 40 TABLET, FILM COATED ORAL at 10:11

## 2018-11-24 RX ADMIN — NIFEDIPINE 30 MG: 30 TABLET, FILM COATED, EXTENDED RELEASE ORAL at 10:11

## 2018-11-24 RX ADMIN — HYDRALAZINE HYDROCHLORIDE 25 MG: 25 TABLET, FILM COATED ORAL at 03:11

## 2018-11-24 RX ADMIN — Medication 3 ML: at 10:11

## 2018-11-24 RX ADMIN — SODIUM CHLORIDE: 0.9 INJECTION, SOLUTION INTRAVENOUS at 07:11

## 2018-11-24 RX ADMIN — IOHEXOL 75 ML: 350 INJECTION, SOLUTION INTRAVENOUS at 12:11

## 2018-11-24 RX ADMIN — GUAIFENESIN AND DEXTROMETHORPHAN 10 ML: 100; 10 SYRUP ORAL at 08:11

## 2018-11-24 RX ADMIN — OXYBUTYNIN CHLORIDE 5 MG: 5 TABLET ORAL at 10:11

## 2018-11-24 RX ADMIN — ISOSORBIDE MONONITRATE 60 MG: 60 TABLET, EXTENDED RELEASE ORAL at 10:11

## 2018-11-24 RX ADMIN — PROMETHAZINE HYDROCHLORIDE 25 MG: 25 TABLET ORAL at 10:11

## 2018-11-24 RX ADMIN — ONDANSETRON 4 MG: 2 INJECTION, SOLUTION INTRAMUSCULAR; INTRAVENOUS at 11:11

## 2018-11-24 RX ADMIN — PANTOPRAZOLE SODIUM 40 MG: 40 TABLET, DELAYED RELEASE ORAL at 10:11

## 2018-11-24 RX ADMIN — METOPROLOL SUCCINATE 25 MG: 25 TABLET, EXTENDED RELEASE ORAL at 10:11

## 2018-11-24 RX ADMIN — GUAIFENESIN AND DEXTROMETHORPHAN 5 ML: 100; 10 SYRUP ORAL at 09:11

## 2018-11-24 RX ADMIN — SODIUM CHLORIDE: 0.9 INJECTION, SOLUTION INTRAVENOUS at 10:11

## 2018-11-24 RX ADMIN — APIXABAN 5 MG: 2.5 TABLET, FILM COATED ORAL at 09:11

## 2018-11-24 NOTE — ED NOTES
Pt. Resting quietly with eyes closed, appearing to be asleep. Pt. In NAD, VSS, resp. E/u. Arouses to touch. WCTM.

## 2018-11-24 NOTE — HPI
54 y/o female with PMHx of stroke, HTN, HLD who presented to the ED with c/o SOB that onset gradually 3 days ago. associated symptoms include constipation, fever, cough, and generalized weakness. Symptoms are constant and moderate. No exacerbating or mitigating factors reported. Pt denies chills, CP, palpitations, ankle edema, LOC, and all other symptoms at this time. ED workup shows H/H 11.2/33.1, Na+ 135, pro calcitonin 0.46, CT chest shows Cardiomegaly and moderate pericardial effusion. Patient admitted to telemetry for SIRS under the care of [facility].  She is a full code and her surrogate decision maker is her mother, Stephie Rodriguez.

## 2018-11-24 NOTE — SUBJECTIVE & OBJECTIVE
Past Medical History:   Diagnosis Date    Coronary artery disease     Coronary artery disease of native artery of native heart with stable angina pectoris 2018    Hypercholesteremia     Hypertension     Neuropathy     Stroke        Past Surgical History:   Procedure Laterality Date     SECTION         Review of patient's allergies indicates:  No Known Allergies    No current facility-administered medications on file prior to encounter.      Current Outpatient Medications on File Prior to Encounter   Medication Sig    amitriptyline (ELAVIL) 50 MG tablet Take 50 mg by mouth every evening.    apixaban (ELIQUIS) 5 mg Tab Take 5 mg by mouth 2 (two) times daily.    aspirin (ECOTRIN) 81 MG EC tablet Take 81 mg by mouth once daily.    atorvastatin (LIPITOR) 80 MG tablet Take 80 mg by mouth once daily.    docusate sodium (COLACE) 100 MG capsule Take 1 capsule (100 mg total) by mouth 2 (two) times daily as needed for Constipation.    gabapentin (NEURONTIN) 600 MG tablet Take 600 mg by mouth 3 (three) times daily.    hydrALAZINE (APRESOLINE) 25 MG tablet Take 1 tablet (25 mg total) by mouth 3 (three) times daily.    hydrocortisone 2.5 % cream Apply topically 2 (two) times daily. for 7 days    isosorbide mononitrate (IMDUR) 60 MG 24 hr tablet Take 1 tablet (60 mg total) by mouth 2 (two) times daily.    lisinopril (PRINIVIL,ZESTRIL) 40 MG tablet Take 40 mg by mouth once daily.    metoprolol succinate (TOPROL-XL) 25 MG 24 hr tablet Take 1 tablet (25 mg total) by mouth once daily. (Patient taking differently: Take 25 mg by mouth once daily. )    NIFEdipine (ADALAT CC) 30 MG TbSR Take 1 tablet (30 mg total) by mouth once daily.    oxybutynin (DITROPAN) 5 MG Tab Take 5 mg by mouth 2 (two) times daily.    levoFLOXacin (LEVAQUIN) 750 MG tablet Take 1 tablet (750 mg total) by mouth once daily.    nitroGLYCERIN (NITROSTAT) 0.4 MG SL tablet Place 1 tablet (0.4 mg total) under the tongue every 5 (five)  minutes as needed for Chest pain.     Family History     Problem Relation (Age of Onset)    Hypertension Mother        Tobacco Use    Smoking status: Former Smoker    Smokeless tobacco: Never Used   Substance and Sexual Activity    Alcohol use: No     Frequency: Never    Drug use: No    Sexual activity: No     Partners: Male     Review of Systems   Constitutional: Positive for activity change, appetite change, fatigue and fever. Negative for chills and diaphoresis.   HENT: Negative for congestion, postnasal drip, rhinorrhea and sore throat.    Eyes: Negative for pain and visual disturbance.   Respiratory: Positive for cough and shortness of breath. Negative for apnea, choking, chest tightness, wheezing and stridor.    Cardiovascular: Negative for chest pain, palpitations and leg swelling.   Gastrointestinal: Positive for constipation. Negative for abdominal distention, abdominal pain, diarrhea, nausea and vomiting.   Endocrine: Negative for cold intolerance and heat intolerance.   Genitourinary: Negative for difficulty urinating, dyspareunia and hematuria.   Musculoskeletal: Negative for arthralgias, back pain and myalgias.   Skin: Negative for color change and wound.   Allergic/Immunologic: Negative.    Neurological: Positive for dizziness and weakness. Negative for tremors, seizures, syncope, facial asymmetry, speech difficulty, light-headedness, numbness and headaches.   Hematological: Bruises/bleeds easily.   Psychiatric/Behavioral: Negative for agitation, behavioral problems and confusion. The patient is not nervous/anxious.      Objective:     Vital Signs (Most Recent):  Temp: 98.4 °F (36.9 °C) (11/24/18 0833)  Pulse: 73 (11/24/18 0833)  Resp: 18 (11/24/18 0833)  BP: 109/66 (11/24/18 0833)  SpO2: 96 % (11/24/18 0833) Vital Signs (24h Range):  Temp:  [97.3 °F (36.3 °C)-101.3 °F (38.5 °C)] 98.4 °F (36.9 °C)  Pulse:  [] 73  Resp:  [12-24] 18  SpO2:  [93 %-100 %] 96 %  BP: ()/(51-66) 109/66      Weight: 96.2 kg (212 lb 1.3 oz)  Body mass index is 37.57 kg/m².    Physical Exam   Constitutional: She is oriented to person, place, and time. She appears well-developed and well-nourished. No distress.   HENT:   Head: Normocephalic and atraumatic.   Nose: Nose normal.   Eyes: Conjunctivae and EOM are normal. No scleral icterus.   Neck: Normal range of motion. Neck supple. No JVD present.   Cardiovascular: Normal rate, regular rhythm, normal heart sounds and intact distal pulses. Exam reveals no gallop and no friction rub.   No murmur heard.  Pulmonary/Chest: Effort normal and breath sounds normal. No respiratory distress. She has no wheezes. She has no rales.   Abdominal: Soft. Bowel sounds are normal. She exhibits no distension. There is no tenderness.   Genitourinary:   Genitourinary Comments: U/a pending   Musculoskeletal: Normal range of motion. She exhibits no edema.   Neurological: She is alert and oriented to person, place, and time. No cranial nerve deficit. Coordination normal.   Skin: Skin is warm and dry. Capillary refill takes 2 to 3 seconds. She is not diaphoretic. No erythema.   Psychiatric: She has a normal mood and affect. Her behavior is normal.   Nursing note and vitals reviewed.        CRANIAL NERVES     CN III, IV, VI   Extraocular motions are normal.        Significant Labs:   Recent Lab Results       11/23/18 2127 11/23/18 1940 11/23/18  1725        Influenza A, Molecular   Negative       Influenza B, Molecular   Negative       Procalcitonin     0.46  Comment:  A concentration < 0.25 ng/mL represents a low risk bacterial   infection.  Procalcitonin may not be accurate among patients with localized   infection, recent trauma or major surgery, immunosuppressed state,   invasive fungal infection, renal dysfunction. Decisions regarding   initiation or continuation of antibiotic therapy should not be based   solely on procalcitonin levels.       Anion Gap     9     Appearance, UA Hazy          Baso #     0.02     Basophil%     0.2     Bilirubin (UA) Negative         BUN, Bld     20     Calcium     8.9     Chloride     105     CO2     21     Color, UA Yellow         Creatinine     1.4     Differential Method     Automated     eGFR if      49     eGFR if non      42  Comment:  Calculation used to obtain the estimated glomerular filtration  rate (eGFR) is the CKD-EPI equation.        Eos #     0.1     Eosinophil%     0.6     Flu A & B Source   Nasal swab       Glucose     110     Glucose, UA Negative         Gran # (ANC)     6.0     Gran%     66.3     Hematocrit     33.1     Hemoglobin     11.2     Hyaline Casts, UA 1         Ketones, UA Trace         Lactate, Fernando     1.4  Comment:  Falsely low lactic acid results can be found in samples   containing >=13.0 mg/dL total bilirubin and/or >=3.5 mg/dL   direct bilirubin.       Leukocytes, UA Trace         Lymph #     2.0     Lymph%     22.7     MCH     30.4     MCHC     33.8     MCV     90     Microscopic Comment SEE COMMENT  Comment:  Other formed elements not mentioned in the report are not   present in the microscopic examination.            Mono #     0.9     Mono%     10.2     MPV     10.5     Nitrite, UA Negative         Occult Blood UA Trace         pH, UA 6.0         Platelets     242     Potassium     4.4     Protein, UA Trace  Comment:  Recommend a 24 hour urine protein or a urine   protein/creatinine ratio if globulin induced proteinuria is  clinically suspected.           RBC     3.68     RBC, UA 4         RDW     15.1     Sodium     135     Specific [place], UA 1.015         Specimen UA Urine, Catheterized         Squam Epithel, UA 1         Troponin I     0.060  Comment:  The reference interval for Troponin I represents the 99th percentile   cutoff   for our facility and is consistent with 3rd generation assay   performance.       Urobilinogen, UA 1.0         WBC, UA 1         WBC     8.96         All pertinent  labs within the past 24 hours have been reviewed.    Significant Imaging: I have reviewed all pertinent imaging results/findings within the past 24 hours.   Imaging Results          CT Chest With Contrast (Final result)  Result time 11/24/18 07:43:41    Final result by Sagar Bateman MD (11/24/18 07:43:41)                 Impression:      Cardiomegaly and moderate pericardial effusion.    2.1 cm nodule within the right thyroid lobe. Recommend follow-up ultrasound.    All CT scans at this facility use dose modulation, iterative reconstruction and/or weight based dosing when appropriate to reduce radiation dose to as low as reasonably achievable.      Electronically signed by: Sagar Bateman MD  Date:    11/24/2018  Time:    07:43             Narrative:    EXAMINATION:  CT CHEST WITH CONTRAST    CLINICAL HISTORY:  Cough, persistent;    TECHNIQUE:  The chest was surveyed from the apices through the posterior costophrenic angles after administration of 75 cc of Omni 350 con.  Data was reconstructed for multiplanar images in axial, sagittal and coronal planes in for maximal intensity projection images in the axial plane.    COMPARISON:  11/23/2018, 11/04/2018    FINDINGS:  Base of Neck: 2.1 cm nodule within the right thyroid lobe.  Recommend follow-up ultrasound.    Airways: Patent.    Lungs: Patchy atelectasis or scarring seen at the lung bases bilaterally.    Pleura: No pleural fluid.No pleural calcification.    Ynes/Mediastinum: Moderate adenopathy is seen within the mediastinum and bilateral hilar regions which may be reactive.  Largest is pretracheal and measures 1.1 cm.    Esophagus: Normal.    Heart/pericardium: Cardiomegaly and moderate pericardial effusion.    Pulmonary vasculature: Pulmonary arteries distribute normally.  No gross pulmonary embolism over evaluation is limited without contrast bolus.  There are four pulmonary veins.    Aorta: Left-sided aortic arch with 3 arterial branches.  The aorta maintains  normal caliber, contour and course. There is moderate calcification of the thoracic aorta.  There is  severe coronary artery calcification.    Thoracic soft tissues: Normal. Both breasts are present.    Bones: No acute fracture. No suspicious lytic or sclerotic lesion.    Upper Abdomen: Moderate constipation..  Small hiatal hernia.  No abnormality of the partially imaged upper abdomen.                               X-Ray Chest AP Portable (Final result)  Result time 11/23/18 19:19:52    Final result by Naveen Sepulveda III, MD (11/23/18 19:19:52)                 Impression:      Cardiomegaly and mild vascular congestion without evidence of pulmonary edema or other acute disease.      Electronically signed by: Naveen Sepulveda MD  Date:    11/23/2018  Time:    19:19             Narrative:    EXAMINATION:  XR CHEST AP PORTABLE    CLINICAL HISTORY:  cough;    COMPARISON:  11/03/2018    FINDINGS:  There is cardiomegaly and mild prominence of the pulmonary vasculature without pulmonary edema identified.  The lungs appear clear of active disease. No acute appearing infiltrate, pleural effusion or pneumothorax identified.                               X-Ray Lumbar Spine Ap And Lateral (Final result)  Result time 11/23/18 19:18:46    Final result by Naveen Sepulveda III, MD (11/23/18 19:18:46)                 Impression:      No acute abnormality identified in the lumbar spine.      Electronically signed by: Naveen Sepulveda MD  Date:    11/23/2018  Time:    19:18             Narrative:    EXAMINATION:  XR LUMBAR SPINE AP AND LATERAL    CLINICAL HISTORY:  T/L-spine trauma, minor-mod, low back pain;Unspecified fall, initial encounter    TECHNIQUE:  AP, lateral and spot images were performed of the lumbar spine.    COMPARISON:  None    FINDINGS:  There is no evidence of fracture. Vertebral body alignment is within normal limits.  There are disc type changes with thick bridging syndesmophytes in the thoracic spine and small anterior  syndesmophytes in the lumbar spine.  There are small anterior endplate osteophytes scattered within the lumbar spine without significant disc height loss.  The visualized sacrum and sacroiliac joints appear intact.

## 2018-11-24 NOTE — ASSESSMENT & PLAN NOTE
--pt with L sided weakness from old CVA  --PT/OT to eval and tx  --continue daily statin, ASA, and eliquis  --monitor

## 2018-11-24 NOTE — ED NOTES
Pt's family to nurses station stating that they have been sitting in the lobby, requesting update. Update provided per MD. Pt's family to go home and sleep/will come back if needed. Pt. Made aware. Pt. Awake and alert, oriented  X4. Pt. Denies needs. Awaiting results of echo for dispo. WCTM.

## 2018-11-24 NOTE — ED NOTES
During assessment pt repeatedly reported that she has no deficits from her previous stroke and that the weakness she now had on her left side was new. Pt was re-assessed in the presence of Shireen, cristina nurse and myself. Pt then reported that the weakness of her left hand was not new and had been there since her previous stroke. Pt was also able to lift her left leg and hold for 5 seconds.  Pt asking for food and and an extra pillow for her leg.

## 2018-11-24 NOTE — ASSESSMENT & PLAN NOTE
--mild - BUN, Cr WNL on admit - eGFR decreased to 49, normal >60  --IVF's given in ED  --continue gentle IVF's  --monitor

## 2018-11-24 NOTE — PROGRESS NOTES
Patient arrived to room 244.  Report received from LASHAY [place].  Tele monitoring started (box [address]).  Monitor tech notified.  VS taken.  Room air.  No chest pain or SOB reported.  Continue to monitor.

## 2018-11-24 NOTE — ASSESSMENT & PLAN NOTE
--pt has chronic mild Pericardial effusion  --moderate Pericardial effusion noted on chest CT  --2D echo pending  --supplemental oxygen to maintain sats

## 2018-11-24 NOTE — H&P
[facility]  History & Physical    Patient Name: Tri Staton  MRN: [id number]  Admission Date: 2018  Attending Physician: Sven Almanza MD   Primary Care Provider: Primary Doctor No         Patient information was obtained from patient and ER records.     Subjective:     Principal Problem:SIRS (systemic inflammatory response syndrome)    Chief Complaint:   Chief Complaint   Patient presents with    Weakness     x 3days, decreased appetite         HPI: 52 y/o female with PMHx of stroke, HTN, HLD who presented to the ED with c/o SOB that onset gradually 3 days ago. associated symptoms include constipation, fever, cough, and generalized weakness. Symptoms are constant and moderate. No exacerbating or mitigating factors reported. Pt denies chills, CP, palpitations, ankle edema, LOC, and all other symptoms at this time. ED workup shows H/H 11.2/33.1, Na+ 135, pro calcitonin 0.46, CT chest shows Cardiomegaly and moderate pericardial effusion. Patient admitted to telemetry for SIRS under the care of [facility].  She is a full code and her surrogate decision maker is her mother, Stephie Rodriguez.    Past Medical History:   Diagnosis Date    Coronary artery disease     Coronary artery disease of native artery of native heart with stable angina pectoris 2018    Hypercholesteremia     Hypertension     Neuropathy     Stroke        Past Surgical History:   Procedure Laterality Date     SECTION         Review of patient's allergies indicates:  No Known Allergies    No current facility-administered medications on file prior to encounter.      Current Outpatient Medications on File Prior to Encounter   Medication Sig    amitriptyline (ELAVIL) 50 MG tablet Take 50 mg by mouth every evening.    apixaban (ELIQUIS) 5 mg Tab Take 5 mg by mouth 2 (two) times daily.    aspirin (ECOTRIN) 81 MG EC tablet Take 81 mg by mouth once daily.    atorvastatin  (LIPITOR) 80 MG tablet Take 80 mg by mouth once daily.    docusate sodium (COLACE) 100 MG capsule Take 1 capsule (100 mg total) by mouth 2 (two) times daily as needed for Constipation.    gabapentin (NEURONTIN) 600 MG tablet Take 600 mg by mouth 3 (three) times daily.    hydrALAZINE (APRESOLINE) 25 MG tablet Take 1 tablet (25 mg total) by mouth 3 (three) times daily.    hydrocortisone 2.5 % cream Apply topically 2 (two) times daily. for 7 days    isosorbide mononitrate (IMDUR) 60 MG 24 hr tablet Take 1 tablet (60 mg total) by mouth 2 (two) times daily.    lisinopril (PRINIVIL,ZESTRIL) 40 MG tablet Take 40 mg by mouth once daily.    metoprolol succinate (TOPROL-XL) 25 MG 24 hr tablet Take 1 tablet (25 mg total) by mouth once daily. (Patient taking differently: Take 25 mg by mouth once daily. )    NIFEdipine (ADALAT CC) 30 MG TbSR Take 1 tablet (30 mg total) by mouth once daily.    oxybutynin (DITROPAN) 5 MG Tab Take 5 mg by mouth 2 (two) times daily.    levoFLOXacin (LEVAQUIN) 750 MG tablet Take 1 tablet (750 mg total) by mouth once daily.    nitroGLYCERIN (NITROSTAT) 0.4 MG SL tablet Place 1 tablet (0.4 mg total) under the tongue every 5 (five) minutes as needed for Chest pain.     Family History     Problem Relation (Age of Onset)    Hypertension Mother        Tobacco Use    Smoking status: Former Smoker    Smokeless tobacco: Never Used   Substance and Sexual Activity    Alcohol use: No     Frequency: Never    Drug use: No    Sexual activity: No     Partners: Male     Review of Systems   Constitutional: Positive for activity change, appetite change, fatigue and fever. Negative for chills and diaphoresis.   HENT: Negative for congestion, postnasal drip, rhinorrhea and sore throat.    Eyes: Negative for pain and visual disturbance.   Respiratory: Positive for cough and shortness of breath. Negative for apnea, choking, chest tightness, wheezing and stridor.    Cardiovascular: Negative for chest pain,  palpitations and leg swelling.   Gastrointestinal: Positive for constipation. Negative for abdominal distention, abdominal pain, diarrhea, nausea and vomiting.   Endocrine: Negative for cold intolerance and heat intolerance.   Genitourinary: Negative for difficulty urinating, dyspareunia and hematuria.   Musculoskeletal: Negative for arthralgias, back pain and myalgias.   Skin: Negative for color change and wound.   Allergic/Immunologic: Negative.    Neurological: Positive for dizziness and weakness. Negative for tremors, seizures, syncope, facial asymmetry, speech difficulty, light-headedness, numbness and headaches.   Hematological: Bruises/bleeds easily.   Psychiatric/Behavioral: Negative for agitation, behavioral problems and confusion. The patient is not nervous/anxious.      Objective:     Vital Signs (Most Recent):  Temp: 98.4 °F (36.9 °C) (11/24/18 0833)  Pulse: 73 (11/24/18 0833)  Resp: 18 (11/24/18 0833)  BP: 109/66 (11/24/18 0833)  SpO2: 96 % (11/24/18 0833) Vital Signs (24h Range):  Temp:  [97.3 °F (36.3 °C)-101.3 °F (38.5 °C)] 98.4 °F (36.9 °C)  Pulse:  [] 73  Resp:  [12-24] 18  SpO2:  [93 %-100 %] 96 %  BP: ()/(51-66) 109/66     Weight: 96.2 kg (212 lb 1.3 oz)  Body mass index is 37.57 kg/m².    Physical Exam   Constitutional: She is oriented to person, place, and time. She appears well-developed and well-nourished. No distress.   HENT:   Head: Normocephalic and atraumatic.   Nose: Nose normal.   Eyes: Conjunctivae and EOM are normal. No scleral icterus.   Neck: Normal range of motion. Neck supple. No JVD present.   Cardiovascular: Normal rate, regular rhythm, normal heart sounds and intact distal pulses. Exam reveals no gallop and no friction rub.   No murmur heard.  Pulmonary/Chest: Effort normal and breath sounds normal. No respiratory distress. She has no wheezes. She has no rales.   Abdominal: Soft. Bowel sounds are normal. She exhibits no distension. There is no tenderness.    Genitourinary:   Genitourinary Comments: U/a pending   Musculoskeletal: Normal range of motion. She exhibits no edema.   Neurological: She is alert and oriented to person, place, and time. No cranial nerve deficit. Coordination normal.   Skin: Skin is warm and dry. Capillary refill takes 2 to 3 seconds. She is not diaphoretic. No erythema.   Psychiatric: She has a normal mood and affect. Her behavior is normal.   Nursing note and vitals reviewed.        CRANIAL NERVES     CN III, IV, VI   Extraocular motions are normal.        Significant Labs:   Recent Lab Results       11/23/18 2127 11/23/18 1940 11/23/18  1725        Influenza A, Molecular   Negative       Influenza B, Molecular   Negative       Procalcitonin     0.46  Comment:  A concentration < 0.25 ng/mL represents a low risk bacterial   infection.  Procalcitonin may not be accurate among patients with localized   infection, recent trauma or major surgery, immunosuppressed state,   invasive fungal infection, renal dysfunction. Decisions regarding   initiation or continuation of antibiotic therapy should not be based   solely on procalcitonin levels.       Anion Gap     9     Appearance, UA Hazy         Baso #     0.02     Basophil%     0.2     Bilirubin (UA) Negative         BUN, Bld     20     Calcium     8.9     Chloride     105     CO2     21     Color, UA Yellow         Creatinine     1.4     Differential Method     Automated     eGFR if      49     eGFR if non      42  Comment:  Calculation used to obtain the estimated glomerular filtration  rate (eGFR) is the CKD-EPI equation.        Eos #     0.1     Eosinophil%     0.6     Flu A & B Source   Nasal swab       Glucose     110     Glucose, UA Negative         Gran # (ANC)     6.0     Gran%     66.3     Hematocrit     33.1     Hemoglobin     11.2     Hyaline Casts, UA 1         Ketones, UA Trace         Lactate, Fernando     1.4  Comment:  Falsely low lactic acid results  can be found in samples   containing >=13.0 mg/dL total bilirubin and/or >=3.5 mg/dL   direct bilirubin.       Leukocytes, UA Trace         Lymph #     2.0     Lymph%     22.7     MCH     30.4     MCHC     33.8     MCV     90     Microscopic Comment SEE COMMENT  Comment:  Other formed elements not mentioned in the report are not   present in the microscopic examination.            Mono #     0.9     Mono%     10.2     MPV     10.5     Nitrite, UA Negative         Occult Blood UA Trace         pH, UA 6.0         Platelets     242     Potassium     4.4     Protein, UA Trace  Comment:  Recommend a 24 hour urine protein or a urine   protein/creatinine ratio if globulin induced proteinuria is  clinically suspected.           RBC     3.68     RBC, UA 4         RDW     15.1     Sodium     135     Specific [place], UA 1.015         Specimen UA Urine, Catheterized         Squam Epithel, UA 1         Troponin I     0.060  Comment:  The reference interval for Troponin I represents the 99th percentile   cutoff   for our facility and is consistent with 3rd generation assay   performance.       Urobilinogen, UA 1.0         WBC, UA 1         WBC     8.96         All pertinent labs within the past 24 hours have been reviewed.    Significant Imaging: I have reviewed all pertinent imaging results/findings within the past 24 hours.   Imaging Results          CT Chest With Contrast (Final result)  Result time 11/24/18 07:43:41    Final result by Sagar Bateman MD (11/24/18 07:43:41)                 Impression:      Cardiomegaly and moderate pericardial effusion.    2.1 cm nodule within the right thyroid lobe. Recommend follow-up ultrasound.    All CT scans at this facility use dose modulation, iterative reconstruction and/or weight based dosing when appropriate to reduce radiation dose to as low as reasonably achievable.      Electronically signed by: Sagar Bateman MD  Date:    11/24/2018  Time:    07:43             Narrative:     EXAMINATION:  CT CHEST WITH CONTRAST    CLINICAL HISTORY:  Cough, persistent;    TECHNIQUE:  The chest was surveyed from the apices through the posterior costophrenic angles after administration of 75 cc of Omni 350 con.  Data was reconstructed for multiplanar images in axial, sagittal and coronal planes in for maximal intensity projection images in the axial plane.    COMPARISON:  11/23/2018, 11/04/2018    FINDINGS:  Base of Neck: 2.1 cm nodule within the right thyroid lobe.  Recommend follow-up ultrasound.    Airways: Patent.    Lungs: Patchy atelectasis or scarring seen at the lung bases bilaterally.    Pleura: No pleural fluid.No pleural calcification.    Ynes/Mediastinum: Moderate adenopathy is seen within the mediastinum and bilateral hilar regions which may be reactive.  Largest is pretracheal and measures 1.1 cm.    Esophagus: Normal.    Heart/pericardium: Cardiomegaly and moderate pericardial effusion.    Pulmonary vasculature: Pulmonary arteries distribute normally.  No gross pulmonary embolism over evaluation is limited without contrast bolus.  There are four pulmonary veins.    Aorta: Left-sided aortic arch with 3 arterial branches.  The aorta maintains normal caliber, contour and course. There is moderate calcification of the thoracic aorta.  There is  severe coronary artery calcification.    Thoracic soft tissues: Normal. Both breasts are present.    Bones: No acute fracture. No suspicious lytic or sclerotic lesion.    Upper Abdomen: Moderate constipation..  Small hiatal hernia.  No abnormality of the partially imaged upper abdomen.                               X-Ray Chest AP Portable (Final result)  Result time 11/23/18 19:19:52    Final result by Naveen Sepulveda III, MD (11/23/18 19:19:52)                 Impression:      Cardiomegaly and mild vascular congestion without evidence of pulmonary edema or other acute disease.      Electronically signed by: Naveen Sepulveda   MD  Date:    11/23/2018  Time:    19:19             Narrative:    EXAMINATION:  XR CHEST AP PORTABLE    CLINICAL HISTORY:  cough;    COMPARISON:  11/03/2018    FINDINGS:  There is cardiomegaly and mild prominence of the pulmonary vasculature without pulmonary edema identified.  The lungs appear clear of active disease. No acute appearing infiltrate, pleural effusion or pneumothorax identified.                               X-Ray Lumbar Spine Ap And Lateral (Final result)  Result time 11/23/18 19:18:46    Final result by Naveen Sepulveda III, MD (11/23/18 19:18:46)                 Impression:      No acute abnormality identified in the lumbar spine.      Electronically signed by: Naveen Sepulveda MD  Date:    11/23/2018  Time:    19:18             Narrative:    EXAMINATION:  XR LUMBAR SPINE AP AND LATERAL    CLINICAL HISTORY:  T/L-spine trauma, minor-mod, low back pain;Unspecified fall, initial encounter    TECHNIQUE:  AP, lateral and spot images were performed of the lumbar spine.    COMPARISON:  None    FINDINGS:  There is no evidence of fracture. Vertebral body alignment is within normal limits.  There are disc type changes with thick bridging syndesmophytes in the thoracic spine and small anterior syndesmophytes in the lumbar spine.  There are small anterior endplate osteophytes scattered within the lumbar spine without significant disc height loss.  The visualized sacrum and sacroiliac joints appear intact.                                  Assessment/Plan:     * SIRS (systemic inflammatory response syndrome)    --temp 101.3, pulse 110  --IV Vancomycin, IV zosyn  --gentle IVF's  --monitor       Pericardial effusion    --pt has chronic mild Pericardial effusion  --moderate Pericardial effusion noted on chest CT  --2D echo pending  --supplemental oxygen to maintain sats       KORI (acute kidney injury)    --mild - BUN, Cr WNL on admit - eGFR decreased to 49, normal >60  --IVF's given in ED  --continue gentle  IVF's  --monitor       Hypercoagulable state    --pt had DVT on 9/16/18 - currently anticoagulated with eliquis  --continue eliquis  --monitor       Late effects of CVA (cerebrovascular accident)    --pt with L sided weakness from old CVA  --PT/OT to eval and tx  --continue daily statin, ASA, and eliquis  --monitor       Mixed hyperlipidemia    --continue atorvastatin  --cardiac diet       Essential hypertension    --continue hydralazine, isosorbide, metoprolol, nifedipine  --cardiac diet       Coronary artery disease of native artery of native heart with stable angina pectoris    --continue daily ASA, eliquis, atorvastatin  --cardiac diet         VTE Risk Mitigation (From admission, onward)        Ordered     apixaban tablet 5 mg  2 times daily      11/24/18 0901             THALIA Wadsworth  Department of Hospital Medicine   [facility]

## 2018-11-24 NOTE — ED NOTES
"Pt. AAOx4. Pt. In NAD, resp. E/u. VSS. Pt. Asks RN to notify MD that pt's upper lip occasionally quivering. Pt. States that the upper lip quivers on both side. Lip not quivering at present per RN assessment and pt. Attestation. Pt. States, "When it does it, you will be able to see. It is obvious." MD made aware. No new orders given at present time. Awaiting admission orders. WCTM.  "

## 2018-11-24 NOTE — PLAN OF CARE
Met with patient at bedside to complete discharge planning assessment. Confirmed patient demographics and current address. Pt stated that her mother will be providing transportation at time of discharge. Patient denies any post hospital needs or services at this time. MSW provided Transitional Care Folder with information on Advance Directives, Discharge Planning Begins on Admission pamphlet,and [facility] Delivery pamphlet. Updated patient whiteboard with current discharge plan and CM contact information. Encouraged patient to contact CM with any discharge questions or needs that may arise. Pt's current pharmacy   [id number] - [place] [facility] [address] [facility]  [address]  [place] [address]  Phone: [phone] Fax: [phone]       11/24/18 1619   Discharge Assessment   Assessment Type Discharge Planning Assessment   Confirmed/corrected address and phone number on facesheet? Yes   Assessment information obtained from? Patient   Prior to hospitilization cognitive status: Alert/Oriented   Prior to hospitalization functional status: Independent   Current cognitive status: Alert/Oriented   Current Functional Status: Independent   Lives With parent(s)   Able to Return to Prior Arrangements yes   Is patient able to care for self after discharge? Yes   Who are your caregiver(s) and their phone number(s)? Stephie Walls Mother: [phone]   Patient's perception of discharge disposition home or selfcare   Readmission Within The Last 30 Days no previous admission in last 30 days   Patient currently being followed by outpatient case management? No   Patient currently receives any other outside agency services? No   Equipment Currently Used at Home walker, standard;bedside commode   Do you have any problems affording any of your prescribed medications? No   Is the patient taking medications as prescribed? yes   Does the patient have  transportation home? Yes   Transportation Available family or friend will provide   Does the patient receive services at the [facility]? No   Discharge Plan A Home   Discharge Plan B Home with family   Patient/Family In Agreement With Plan yes

## 2018-11-25 LAB
ANION GAP SERPL CALC-SCNC: 11 MMOL/L
BASOPHILS # BLD AUTO: 0.02 K/UL
BASOPHILS NFR BLD: 0.3 %
BUN SERPL-MCNC: 18 MG/DL
CALCIUM SERPL-MCNC: 8.9 MG/DL
CHLORIDE SERPL-SCNC: 107 MMOL/L
CO2 SERPL-SCNC: 20 MMOL/L
CREAT SERPL-MCNC: 1.1 MG/DL
DIFFERENTIAL METHOD: ABNORMAL
EOSINOPHIL # BLD AUTO: 0 K/UL
EOSINOPHIL NFR BLD: 0.1 %
ERYTHROCYTE [DISTWIDTH] IN BLOOD BY AUTOMATED COUNT: 14.6 %
EST. GFR  (AFRICAN AMERICAN): >60 ML/MIN/1.73 M^2
EST. GFR  (NON AFRICAN AMERICAN): 57 ML/MIN/1.73 M^2
GLUCOSE SERPL-MCNC: 122 MG/DL
HCT VFR BLD AUTO: 34.2 %
HGB BLD-MCNC: 11.5 G/DL
LYMPHOCYTES # BLD AUTO: 1 K/UL
LYMPHOCYTES NFR BLD: 13.1 %
MAGNESIUM SERPL-MCNC: 1.8 MG/DL
MCH RBC QN AUTO: 31 PG
MCHC RBC AUTO-ENTMCNC: 33.6 G/DL
MCV RBC AUTO: 92 FL
MONOCYTES # BLD AUTO: 0.4 K/UL
MONOCYTES NFR BLD: 4.9 %
NEUTROPHILS # BLD AUTO: 6.2 K/UL
NEUTROPHILS NFR BLD: 81.6 %
PHOSPHATE SERPL-MCNC: 4.4 MG/DL
PLATELET # BLD AUTO: 314 K/UL
PMV BLD AUTO: 10.1 FL
POTASSIUM SERPL-SCNC: 4.2 MMOL/L
RBC # BLD AUTO: 3.71 M/UL
SODIUM SERPL-SCNC: 138 MMOL/L
WBC # BLD AUTO: 7.58 K/UL

## 2018-11-25 PROCEDURE — 83735 ASSAY OF MAGNESIUM: CPT

## 2018-11-25 PROCEDURE — 21400001 HC TELEMETRY ROOM

## 2018-11-25 PROCEDURE — 94760 N-INVAS EAR/PLS OXIMETRY 1: CPT

## 2018-11-25 PROCEDURE — 85025 COMPLETE CBC W/AUTO DIFF WBC: CPT

## 2018-11-25 PROCEDURE — 25000003 PHARM REV CODE 250: Performed by: HOSPITALIST

## 2018-11-25 PROCEDURE — 80048 BASIC METABOLIC PNL TOTAL CA: CPT

## 2018-11-25 PROCEDURE — A4216 STERILE WATER/SALINE, 10 ML: HCPCS | Performed by: NURSE PRACTITIONER

## 2018-11-25 PROCEDURE — 25000003 PHARM REV CODE 250: Performed by: NURSE PRACTITIONER

## 2018-11-25 PROCEDURE — 36415 COLL VENOUS BLD VENIPUNCTURE: CPT

## 2018-11-25 PROCEDURE — 63600175 PHARM REV CODE 636 W HCPCS: Performed by: HOSPITALIST

## 2018-11-25 PROCEDURE — 84100 ASSAY OF PHOSPHORUS: CPT

## 2018-11-25 RX ADMIN — HYDRALAZINE HYDROCHLORIDE 25 MG: 25 TABLET, FILM COATED ORAL at 09:11

## 2018-11-25 RX ADMIN — PIPERACILLIN SODIUM AND TAZOBACTAM SODIUM 4.5 G: 4; .5 INJECTION, POWDER, LYOPHILIZED, FOR SOLUTION INTRAVENOUS at 09:11

## 2018-11-25 RX ADMIN — APIXABAN 5 MG: 2.5 TABLET, FILM COATED ORAL at 08:11

## 2018-11-25 RX ADMIN — HYDRALAZINE HYDROCHLORIDE 25 MG: 25 TABLET, FILM COATED ORAL at 08:11

## 2018-11-25 RX ADMIN — ONDANSETRON 4 MG: 2 INJECTION, SOLUTION INTRAMUSCULAR; INTRAVENOUS at 09:11

## 2018-11-25 RX ADMIN — HYDRALAZINE HYDROCHLORIDE 25 MG: 25 TABLET, FILM COATED ORAL at 04:11

## 2018-11-25 RX ADMIN — PANTOPRAZOLE SODIUM 40 MG: 40 TABLET, DELAYED RELEASE ORAL at 08:11

## 2018-11-25 RX ADMIN — ATORVASTATIN CALCIUM 80 MG: 40 TABLET, FILM COATED ORAL at 08:11

## 2018-11-25 RX ADMIN — Medication 3 ML: at 09:11

## 2018-11-25 RX ADMIN — FUROSEMIDE 40 MG: 10 INJECTION, SOLUTION INTRAMUSCULAR; INTRAVENOUS at 08:11

## 2018-11-25 RX ADMIN — OXYBUTYNIN CHLORIDE 5 MG: 5 TABLET ORAL at 08:11

## 2018-11-25 RX ADMIN — APIXABAN 5 MG: 2.5 TABLET, FILM COATED ORAL at 09:11

## 2018-11-25 RX ADMIN — ASPIRIN 81 MG: 81 TABLET, COATED ORAL at 08:11

## 2018-11-25 RX ADMIN — NIFEDIPINE 30 MG: 30 TABLET, FILM COATED, EXTENDED RELEASE ORAL at 08:11

## 2018-11-25 RX ADMIN — PIPERACILLIN SODIUM AND TAZOBACTAM SODIUM 4.5 G: 4; .5 INJECTION, POWDER, LYOPHILIZED, FOR SOLUTION INTRAVENOUS at 01:11

## 2018-11-25 RX ADMIN — Medication 3 ML: at 05:11

## 2018-11-25 RX ADMIN — VANCOMYCIN HYDROCHLORIDE 1000 MG: 1 INJECTION, POWDER, LYOPHILIZED, FOR SOLUTION INTRAVENOUS at 05:11

## 2018-11-25 RX ADMIN — PIPERACILLIN SODIUM AND TAZOBACTAM SODIUM 4.5 G: 4; .5 INJECTION, POWDER, LYOPHILIZED, FOR SOLUTION INTRAVENOUS at 05:11

## 2018-11-25 RX ADMIN — Medication 3 ML: at 01:11

## 2018-11-25 RX ADMIN — POLYETHYLENE GLYCOL 3350 17 G: 17 POWDER, FOR SOLUTION ORAL at 08:11

## 2018-11-25 RX ADMIN — METOPROLOL SUCCINATE 25 MG: 25 TABLET, EXTENDED RELEASE ORAL at 08:11

## 2018-11-25 RX ADMIN — FUROSEMIDE 40 MG: 10 INJECTION, SOLUTION INTRAMUSCULAR; INTRAVENOUS at 05:11

## 2018-11-25 RX ADMIN — ISOSORBIDE MONONITRATE 60 MG: 60 TABLET, EXTENDED RELEASE ORAL at 08:11

## 2018-11-25 RX ADMIN — ONDANSETRON 4 MG: 2 INJECTION, SOLUTION INTRAMUSCULAR; INTRAVENOUS at 01:11

## 2018-11-25 NOTE — ASSESSMENT & PLAN NOTE
Temp 101.3, pulse 110 continuing IV Vancomycin and Zosyn.  Cultures drawn and pending.  May have viral syndrome.

## 2018-11-25 NOTE — ASSESSMENT & PLAN NOTE
Mild - BUN, Cr WNL on admit - eGFR decreased to 49, normal >60.  IVF's given in ED.  Interval improvement each day.

## 2018-11-25 NOTE — SUBJECTIVE & OBJECTIVE
Interval History:  One episode of vomiting at 3 am then again around noon.  Occasional shortness of breath and rare cough but no chest pain.    Review of Systems   Constitutional: Positive for fatigue. Negative for chills and fever.   HENT: Negative for congestion and sore throat.    Eyes: Negative for visual disturbance.   Respiratory: Positive for shortness of breath. Negative for cough and wheezing.    Cardiovascular: Negative for chest pain, palpitations and leg swelling.   Gastrointestinal: Positive for vomiting. Negative for abdominal pain, blood in stool, constipation, diarrhea and nausea.   Genitourinary: Negative for dysuria and hematuria.   Musculoskeletal: Negative for arthralgias and back pain.   Skin: Negative for rash and wound.   Neurological: Positive for weakness. Negative for dizziness, light-headedness and numbness.   Hematological: Negative for adenopathy.     Objective:     Vital Signs (Most Recent):  Temp: 98.5 °F (36.9 °C) (11/25/18 1129)  Pulse: (!) 114 (11/25/18 1129)  Resp: 20 (11/25/18 1129)  BP: 135/83 (11/25/18 1129)  SpO2: 96 % (11/25/18 1129) Vital Signs (24h Range):  Temp:  [98.1 °F (36.7 °C)-101.5 °F (38.6 °C)] 98.5 °F (36.9 °C)  Pulse:  [] 114  Resp:  [17-20] 20  SpO2:  [94 %-99 %] 96 %  BP: (109-138)/(59-83) 135/83     Weight: 99 kg (218 lb 4.1 oz)  Body mass index is 38.66 kg/m².    Intake/Output Summary (Last 24 hours) at 11/25/2018 1351  Last data filed at 11/25/2018 0800  Gross per 24 hour   Intake 1660 ml   Output 1 ml   Net 1659 ml      Physical Exam   Constitutional: She is oriented to person, place, and time. She appears well-developed and well-nourished. No distress.   HENT:   Head: Normocephalic and atraumatic.   Mouth/Throat: Oropharynx is clear and moist.   Eyes: Conjunctivae and EOM are normal. Pupils are equal, round, and reactive to light.   Neck: Neck supple. No JVD present. No thyromegaly present.   Cardiovascular: Normal rate and regular rhythm. Exam reveals  no gallop and no friction rub.   No murmur heard.  Pulmonary/Chest: Effort normal and breath sounds normal. She has no wheezes. She has no rales.   Abdominal: Soft. Bowel sounds are normal. She exhibits no distension. There is no tenderness. There is no rebound and no guarding.   Musculoskeletal: Normal range of motion. She exhibits no edema or deformity.   Lymphadenopathy:     She has no cervical adenopathy.   Neurological: She is alert and oriented to person, place, and time. She has normal reflexes.   Skin: Skin is warm and dry. No rash noted.   Psychiatric: She has a normal mood and affect. Her behavior is normal. Judgment and thought content normal.   Nursing note and vitals reviewed.      Significant Labs: All pertinent labs within the past 24 hours have been reviewed.    Significant Imaging: I have reviewed all pertinent imaging results/findings within the past 24 hours.

## 2018-11-25 NOTE — PLAN OF CARE
Problem: Patient Care Overview  Goal: Plan of Care Review  Outcome: Ongoing (interventions implemented as appropriate)  Plan of care discussed with patient, and patient verbalized understanding.  Patient remained AOx4, room air, and NSR to ST on the monitor.  Patient turned q 2hr.  Patient remained free of falls, accidents, and trama during the day shift.  Bed is in the lowest position and call light is within reach - Continue to monitor.

## 2018-11-25 NOTE — PLAN OF CARE
Problem: Patient Care Overview  Goal: Plan of Care Review  Outcome: Outcome(s) achieved Date Met: 11/25/18  POC discussed with patient, verbalized understanding. Sinus tachy on monitor. VSS except for temp; febrile during shift with Tmax of 101.5.  Tylenol given as ordered; see eMAR. Patient starting Zosyn and  Vancomycin.  Complained of nausea and acid reflux during shift for which patient was also medicated; see eMAR. AAO X 2, person and place; disoriented to time.  Processes slowly.Voids per brief. Patient turns with assist in bed. Fall precautions in place, call bell in reach, bed in low and locked position. Patient remains free from falls/injuries. Patient denies needs at this time. Will continue to monitor.

## 2018-11-25 NOTE — ASSESSMENT & PLAN NOTE
Moderate Pericardial effusion noted on chest CT.  Cardiac echo showed stable size and no tamponade.  Supplemental oxygen to maintain sats.

## 2018-11-25 NOTE — CONSULTS
Tri Staton [id number] is a 53 y.o. female who has been consulted for vancomycin dosing.  Dx: bacteremia / goal trough 15-20  The patient has the following labs:     Date Creatinine (mg/dl)    BUN WBC Count   11/24/2018 Estimated Creatinine Clearance: 58.5 mL/min (based on SCr of 1.2 mg/dL). Lab Results   Component Value Date    BUN 21 (H) 11/24/2018     Lab Results   Component Value Date    WBC 5.15 11/24/2018      which calculates to an Estimated Creatinine Clearance: 58.5 mL/min (based on SCr of 1.2 mg/dL)..       Current weight is 96.2 kg (212 lb 1.3 oz)  One time dose in ER of 1000mg  The patient will be started on vancomycin at a dose of 1000 mg every 18 hours. Patient will be followed by pharmacy for changes in renal function, toxicity, and efficacy.  The vancomycin trough has been ordered for 11/26 at 09:00.      Thank you for allowing us to participate in this patient's care.     Kishor Palomino

## 2018-11-25 NOTE — ASSESSMENT & PLAN NOTE
Continue daily ASA, eliquis, atorvastatin.  Cardiac echo shows stable moderate effusion and no interval changes.

## 2018-11-25 NOTE — PROGRESS NOTES
Informed by RN that patient spiked fever 101.5F. Patient was given tylenol. Patient has no antibiotics in hospital medication list.  She is here for SIRS, no source of infection identified.  Plan from day team reviewed showing to continue IV abx.  Will start zosyn 4.5 q8h and pharm to dose vancomycin.  CT findings reviewed showed 2.1 cm thyroid nodule.  Ordered TSH and US thyroid.

## 2018-11-25 NOTE — HOSPITAL COURSE
Admitted for evaluation and treatment of shortness of breath, weakness, and fatigue.  CT of the chest negative for pneumonia or pulmonary embolism but re-demonstrated moderate pericardial effusion.  Cardiac echo showed no evidence of tamponade and stable moderate affusion.  WBC count remained normal with a mildly elevated procalcitonin of 0.46 and fever up to 101.  She had no dysuria but some urge incontinence.  Developed vomiting after eating so changed her to a trial of clear liquids.  Mild tachycardia overnight with chest pain.  EKG showed sinus rhythm without ischemic changes and troponin 0.008.  Continued to monitor with caution.  She has severe coronary artery disease.  Remained afebrile and cultures negative thus far.  Stopped Vancomycin and Zosyn.  Repeat urinalysis revealed Trichomonas.  Treated with single dose Metronidazole 2 g.  Byron HIV, RPR, GC, and Chlamydia - Pending results at discharge.  Discharge plan to return home and follow up with PCP.  Referral for outpatient PT/OT.

## 2018-11-25 NOTE — PROGRESS NOTES
[facility] Hospital Medicine  Progress Note    Patient Name: Tri Staton  MRN: [id number]  Patient Class: IP- Inpatient   Admission Date: 11/23/2018  Length of Stay: 1 days  Attending Physician: Lucas Rincon MD  Primary Care Provider: Primary Doctor No        Subjective:     Principal Problem:SIRS (systemic inflammatory response syndrome)    HPI:  52 y/o female with PMHx of stroke, HTN, HLD who presented to the ED with c/o SOB that onset gradually 3 days ago. associated symptoms include constipation, fever, cough, and generalized weakness. Symptoms are constant and moderate. No exacerbating or mitigating factors reported. Pt denies chills, CP, palpitations, ankle edema, LOC, and all other symptoms at this time. ED workup shows H/H 11.2/33.1, Na+ 135, pro calcitonin 0.46, CT chest shows Cardiomegaly and moderate pericardial effusion. Patient admitted to telemetry for SIRS under the care of [facility].  She is a full code and her surrogate decision maker is her mother, Stephie Rodriguez.    Hospital Course:  Admitted for evaluation and treatment of shortness of breath, weakness, and fatigue.  CT of the chest negative for pneumonia or pulmonary embolism but re-demonstrated moderate pericardial effusion.  Cardiac echo showed no evidence of tamponade and stable moderate affusion.  WBC count remained normal with a mildly elevated procalcitonin of 0.46 and fever up to 101.  She had no dysuria but some urge incontinence.  Developed vomiting after eating so changed her to a trial of clear liquids.    Interval History:  One episode of vomiting at 3 am then again around noon.  Occasional shortness of breath and rare cough but no chest pain.    Review of Systems   Constitutional: Positive for fatigue. Negative for chills and fever.   HENT: Negative for congestion and sore throat.    Eyes: Negative for visual disturbance.   Respiratory: Positive for shortness of breath. Negative for  cough and wheezing.    Cardiovascular: Negative for chest pain, palpitations and leg swelling.   Gastrointestinal: Positive for vomiting. Negative for abdominal pain, blood in stool, constipation, diarrhea and nausea.   Genitourinary: Negative for dysuria and hematuria.   Musculoskeletal: Negative for arthralgias and back pain.   Skin: Negative for rash and wound.   Neurological: Positive for weakness. Negative for dizziness, light-headedness and numbness.   Hematological: Negative for adenopathy.     Objective:     Vital Signs (Most Recent):  Temp: 98.5 °F (36.9 °C) (11/25/18 1129)  Pulse: (!) 114 (11/25/18 1129)  Resp: 20 (11/25/18 1129)  BP: 135/83 (11/25/18 1129)  SpO2: 96 % (11/25/18 1129) Vital Signs (24h Range):  Temp:  [98.1 °F (36.7 °C)-101.5 °F (38.6 °C)] 98.5 °F (36.9 °C)  Pulse:  [] 114  Resp:  [17-20] 20  SpO2:  [94 %-99 %] 96 %  BP: (109-138)/(59-83) 135/83     Weight: 99 kg (218 lb 4.1 oz)  Body mass index is 38.66 kg/m².    Intake/Output Summary (Last 24 hours) at 11/25/2018 1351  Last data filed at 11/25/2018 0800  Gross per 24 hour   Intake 1660 ml   Output 1 ml   Net 1659 ml      Physical Exam   Constitutional: She is oriented to person, place, and time. She appears well-developed and well-nourished. No distress.   HENT:   Head: Normocephalic and atraumatic.   Mouth/Throat: Oropharynx is clear and moist.   Eyes: Conjunctivae and EOM are normal. Pupils are equal, round, and reactive to light.   Neck: Neck supple. No JVD present. No thyromegaly present.   Cardiovascular: Normal rate and regular rhythm. Exam reveals no gallop and no friction rub.   No murmur heard.  Pulmonary/Chest: Effort normal and breath sounds normal. She has no wheezes. She has no rales.   Abdominal: Soft. Bowel sounds are normal. She exhibits no distension. There is no tenderness. There is no rebound and no guarding.   Musculoskeletal: Normal range of motion. She exhibits no edema or deformity.   Lymphadenopathy:     She  has no cervical adenopathy.   Neurological: She is alert and oriented to person, place, and time. She has normal reflexes.   Skin: Skin is warm and dry. No rash noted.   Psychiatric: She has a normal mood and affect. Her behavior is normal. Judgment and thought content normal.   Nursing note and vitals reviewed.      Significant Labs: All pertinent labs within the past 24 hours have been reviewed.    Significant Imaging: I have reviewed all pertinent imaging results/findings within the past 24 hours.    Assessment/Plan:      * SIRS (systemic inflammatory response syndrome)    Temp 101.3, pulse 110 continuing IV Vancomycin and Zosyn.  Cultures drawn and pending.  May have viral syndrome.     Hypercoagulable state    Had DVT on 9/16/18 - currently anticoagulated with eliquis.     KORI (acute kidney injury)    Mild - BUN, Cr WNL on admit - eGFR decreased to 49, normal >60.  IVF's given in ED.  Interval improvement each day.     Late effects of CVA (cerebrovascular accident)    L sided weakness from old CVA.  PT/OT to eval and tx.  Continue daily statin, ASA, and eliquis.     Mixed hyperlipidemia    Continue atorvastatin.     Essential hypertension    Continue hydralazine, isosorbide, metoprolol, nifedipine.     Pericardial effusion    Moderate Pericardial effusion noted on chest CT.  Cardiac echo showed stable size and no tamponade.  Supplemental oxygen to maintain sats.     Coronary artery disease of native artery of native heart with stable angina pectoris    Continue daily ASA, eliquis, atorvastatin.  Cardiac echo shows stable moderate effusion and no interval changes.       VTE Risk Mitigation (From admission, onward)        Ordered     apixaban tablet 5 mg  2 times daily      11/24/18 0901              Lucas Rincon MD  Department of Hospital Medicine   [facility]

## 2018-11-26 LAB
ANION GAP SERPL CALC-SCNC: 10 MMOL/L
BASOPHILS # BLD AUTO: 0.02 K/UL
BASOPHILS NFR BLD: 0.2 %
BILIRUB UR QL STRIP: NEGATIVE
BUN SERPL-MCNC: 20 MG/DL
CALCIUM SERPL-MCNC: 8.9 MG/DL
CHLORIDE SERPL-SCNC: 104 MMOL/L
CLARITY UR: CLEAR
CO2 SERPL-SCNC: 23 MMOL/L
COLOR UR: YELLOW
CREAT SERPL-MCNC: 1.2 MG/DL
DIFFERENTIAL METHOD: ABNORMAL
EOSINOPHIL # BLD AUTO: 0.1 K/UL
EOSINOPHIL NFR BLD: 1.4 %
ERYTHROCYTE [DISTWIDTH] IN BLOOD BY AUTOMATED COUNT: 14.9 %
EST. GFR  (AFRICAN AMERICAN): 59 ML/MIN/1.73 M^2
EST. GFR  (NON AFRICAN AMERICAN): 51 ML/MIN/1.73 M^2
GLUCOSE SERPL-MCNC: 108 MG/DL
GLUCOSE UR QL STRIP: NEGATIVE
HCT VFR BLD AUTO: 37.3 %
HGB BLD-MCNC: 12.5 G/DL
HGB UR QL STRIP: ABNORMAL
KETONES UR QL STRIP: NEGATIVE
LEUKOCYTE ESTERASE UR QL STRIP: ABNORMAL
LYMPHOCYTES # BLD AUTO: 2 K/UL
LYMPHOCYTES NFR BLD: 18.9 %
MAGNESIUM SERPL-MCNC: 2 MG/DL
MCH RBC QN AUTO: 30.2 PG
MCHC RBC AUTO-ENTMCNC: 33.5 G/DL
MCV RBC AUTO: 90 FL
MICROSCOPIC COMMENT: ABNORMAL
MONOCYTES # BLD AUTO: 0.6 K/UL
MONOCYTES NFR BLD: 5.7 %
NEUTROPHILS # BLD AUTO: 7.6 K/UL
NEUTROPHILS NFR BLD: 73.8 %
NITRITE UR QL STRIP: NEGATIVE
PH UR STRIP: 7 [PH] (ref 5–8)
PHOSPHATE SERPL-MCNC: 3.8 MG/DL
PLATELET # BLD AUTO: 303 K/UL
PMV BLD AUTO: 10.2 FL
POTASSIUM SERPL-SCNC: 4.2 MMOL/L
PROT UR QL STRIP: NEGATIVE
RBC # BLD AUTO: 4.14 M/UL
RBC #/AREA URNS HPF: 4 /HPF (ref 0–4)
SODIUM SERPL-SCNC: 137 MMOL/L
SP GR UR STRIP: 1.01 (ref 1–1.03)
SQUAMOUS #/AREA URNS HPF: 3 /HPF
TRICHOMONAS UR QL MICRO: ABNORMAL
TROPONIN I SERPL DL<=0.01 NG/ML-MCNC: 0.01 NG/ML
URN SPEC COLLECT METH UR: ABNORMAL
UROBILINOGEN UR STRIP-ACNC: 1 EU/DL
VANCOMYCIN TROUGH SERPL-MCNC: 10.6 UG/ML
WBC # BLD AUTO: 10.32 K/UL
WBC #/AREA URNS HPF: 2 /HPF (ref 0–5)

## 2018-11-26 PROCEDURE — 81000 URINALYSIS NONAUTO W/SCOPE: CPT

## 2018-11-26 PROCEDURE — 93005 ELECTROCARDIOGRAM TRACING: CPT

## 2018-11-26 PROCEDURE — 97166 OT EVAL MOD COMPLEX 45 MIN: CPT

## 2018-11-26 PROCEDURE — 97530 THERAPEUTIC ACTIVITIES: CPT

## 2018-11-26 PROCEDURE — 84100 ASSAY OF PHOSPHORUS: CPT

## 2018-11-26 PROCEDURE — G8979 MOBILITY GOAL STATUS: HCPCS | Mod: CJ | Performed by: PHYSICAL THERAPIST

## 2018-11-26 PROCEDURE — 21400001 HC TELEMETRY ROOM

## 2018-11-26 PROCEDURE — 83735 ASSAY OF MAGNESIUM: CPT

## 2018-11-26 PROCEDURE — G8988 SELF CARE GOAL STATUS: HCPCS | Mod: CK

## 2018-11-26 PROCEDURE — 80048 BASIC METABOLIC PNL TOTAL CA: CPT

## 2018-11-26 PROCEDURE — 97162 PT EVAL MOD COMPLEX 30 MIN: CPT | Performed by: PHYSICAL THERAPIST

## 2018-11-26 PROCEDURE — 25000003 PHARM REV CODE 250: Performed by: NURSE PRACTITIONER

## 2018-11-26 PROCEDURE — 36415 COLL VENOUS BLD VENIPUNCTURE: CPT

## 2018-11-26 PROCEDURE — 25000003 PHARM REV CODE 250: Performed by: HOSPITALIST

## 2018-11-26 PROCEDURE — 94761 N-INVAS EAR/PLS OXIMETRY MLT: CPT

## 2018-11-26 PROCEDURE — G8978 MOBILITY CURRENT STATUS: HCPCS | Mod: CK | Performed by: PHYSICAL THERAPIST

## 2018-11-26 PROCEDURE — G8987 SELF CARE CURRENT STATUS: HCPCS | Mod: CM

## 2018-11-26 PROCEDURE — 97530 THERAPEUTIC ACTIVITIES: CPT | Performed by: PHYSICAL THERAPIST

## 2018-11-26 PROCEDURE — 80202 ASSAY OF VANCOMYCIN: CPT

## 2018-11-26 PROCEDURE — 63600175 PHARM REV CODE 636 W HCPCS: Performed by: HOSPITALIST

## 2018-11-26 PROCEDURE — 93010 ELECTROCARDIOGRAM REPORT: CPT | Mod: ,,, | Performed by: INTERNAL MEDICINE

## 2018-11-26 PROCEDURE — 85025 COMPLETE CBC W/AUTO DIFF WBC: CPT

## 2018-11-26 PROCEDURE — 84484 ASSAY OF TROPONIN QUANT: CPT

## 2018-11-26 PROCEDURE — A4216 STERILE WATER/SALINE, 10 ML: HCPCS | Performed by: NURSE PRACTITIONER

## 2018-11-26 RX ORDER — METOPROLOL SUCCINATE 25 MG/1
25 TABLET, EXTENDED RELEASE ORAL DAILY
Status: DISCONTINUED | OUTPATIENT
Start: 2018-11-26 | End: 2018-11-27 | Stop reason: HOSPADM

## 2018-11-26 RX ORDER — METOPROLOL SUCCINATE 50 MG/1
50 TABLET, EXTENDED RELEASE ORAL DAILY
Status: DISCONTINUED | OUTPATIENT
Start: 2018-11-26 | End: 2018-11-26

## 2018-11-26 RX ADMIN — APIXABAN 5 MG: 2.5 TABLET, FILM COATED ORAL at 09:11

## 2018-11-26 RX ADMIN — METOPROLOL SUCCINATE 25 MG: 25 TABLET, EXTENDED RELEASE ORAL at 09:11

## 2018-11-26 RX ADMIN — FUROSEMIDE 40 MG: 10 INJECTION, SOLUTION INTRAMUSCULAR; INTRAVENOUS at 09:11

## 2018-11-26 RX ADMIN — HYDRALAZINE HYDROCHLORIDE 25 MG: 25 TABLET, FILM COATED ORAL at 09:11

## 2018-11-26 RX ADMIN — APIXABAN 5 MG: 2.5 TABLET, FILM COATED ORAL at 08:11

## 2018-11-26 RX ADMIN — OXYBUTYNIN CHLORIDE 5 MG: 5 TABLET ORAL at 08:11

## 2018-11-26 RX ADMIN — Medication 3 ML: at 06:11

## 2018-11-26 RX ADMIN — ISOSORBIDE MONONITRATE 60 MG: 60 TABLET, EXTENDED RELEASE ORAL at 09:11

## 2018-11-26 RX ADMIN — POLYETHYLENE GLYCOL 3350 17 G: 17 POWDER, FOR SOLUTION ORAL at 09:11

## 2018-11-26 RX ADMIN — OXYBUTYNIN CHLORIDE 5 MG: 5 TABLET ORAL at 09:11

## 2018-11-26 RX ADMIN — Medication 3 ML: at 10:11

## 2018-11-26 RX ADMIN — ASPIRIN 81 MG: 81 TABLET, COATED ORAL at 09:11

## 2018-11-26 RX ADMIN — ATORVASTATIN CALCIUM 80 MG: 40 TABLET, FILM COATED ORAL at 09:11

## 2018-11-26 RX ADMIN — Medication 3 ML: at 08:11

## 2018-11-26 RX ADMIN — FUROSEMIDE 40 MG: 10 INJECTION, SOLUTION INTRAMUSCULAR; INTRAVENOUS at 05:11

## 2018-11-26 RX ADMIN — AMITRIPTYLINE HYDROCHLORIDE 50 MG: 50 TABLET, FILM COATED ORAL at 08:11

## 2018-11-26 RX ADMIN — NIFEDIPINE 30 MG: 30 TABLET, FILM COATED, EXTENDED RELEASE ORAL at 09:11

## 2018-11-26 RX ADMIN — PIPERACILLIN SODIUM AND TAZOBACTAM SODIUM 4.5 G: 4; .5 INJECTION, POWDER, LYOPHILIZED, FOR SOLUTION INTRAVENOUS at 05:11

## 2018-11-26 RX ADMIN — PANTOPRAZOLE SODIUM 40 MG: 40 TABLET, DELAYED RELEASE ORAL at 09:11

## 2018-11-26 NOTE — PLAN OF CARE
Problem: Patient Care Overview  Goal: Plan of Care Review  Outcome: Ongoing (interventions implemented as appropriate)  Plan of care discussed with patient, and patient verbalized understanding.  Patient remained AOx4, room air, and ST on the monitor.  Intermittent nausea controlled with medication.  Family has remained at the bedside this afternoon.  Patient is now on a clear liquid diet.  Patient remained free of falls, accidents, and trama during the day shift.  Bed is in the lowest position and call light is within reach - Continue to monitor.

## 2018-11-26 NOTE — PT/OT/SLP EVAL
Occupational Therapy   Evaluation    Name: Tri Staton  MRN: [id number]  Admitting Diagnosis:  SIRS (systemic inflammatory response syndrome)      Recommendations:     Discharge Recommendations: home health OT  Discharge Equipment Recommendations:  none  Barriers to discharge:  None    History:     Occupational Profile:  Living Environment: lives with mother in 1 story house no steps to enter  Previous level of function: assistance with adl's and functional mobility  Roles and Routines: occupational therapy  Equipment Used at Home:  walker, rolling, shower chair  Assistance upon Discharge:     Past Medical History:   Diagnosis Date    Coronary artery disease     Coronary artery disease of native artery of native heart with stable angina pectoris 2018    Hypercholesteremia     Hypertension     Neuropathy     Stroke        Past Surgical History:   Procedure Laterality Date     SECTION         Subjective     Chief Complaint: debility and generalized weakness  Patient/Family Comments/goals:     Pain/Comfort:  · Pain Rating 1: 0/10  · Location - Orientation 1: upper    Patients cultural, spiritual, Hindu conflicts given the current situation:      Objective:     Communicated with: nurse Maloney and epic chart review prior to session.  Patient found with: all lines intact, call button in reach and nurse Maloney notified and   upon OT entry to room.    General Precautions: Standard,     Orthopedic Precautions:    Braces: N/A     Occupational Performance:    Bed Mobility:    · Patient completed Rolling/Turning to Right with moderate assistance  · Patient completed Scooting/Bridging with moderate assistance  · Patient completed Supine to Sit with moderate assistance    Functional Mobility/Transfers:  · Patient completed Sit <> Stand Transfer with minimum assistance  with  rolling walker   · Patient completed Bed <> Chair Transfer using Stand Pivot technique with minimum assistance with  "rolling walker  · Functional Mobility: pt ambulated 20 feet with min a    Activities of Daily Living:  · Upper Body Dressing: moderate assistance .  · Lower Body Dressing: maximal assistance .    Cognitive/Visual Perceptual:  Cognitive/Psychosocial Skills:  -       Oriented to: Person, Place, Time and Situation   -       Follows Commands/attention:Follows two-step commands  -       Communication: clear/fluent  -       Memory: No Deficits noted  -       Safety awareness/insight to disability: impaired   Visual/Perceptual:      -Intact .    Physical Exam:  Upper Extremity Range of Motion:     -       Right Upper Extremity: WFL  -       Left Upper Extremity: WFL except shoulder 75 degrees  Upper Extremity Strength:    -       Right Upper Extremity: mmt: 3/5 grossly  -       Left Upper Extremity: mmt: 2/5 grossly   Strength:    -       Right Upper Extremity: mmt: 3/5 grossly  -       Left Upper Extremity: mmt: 3/5 grossly    AMPAC 6 Click ADL:  AMPAC Total Score: 14    Treatment & Education:    Education:    Patient left up in chair with all lines intact, call button in reach and nurse Amara notified    Assessment:     Tri Staton is a 53 y.o. female with a medical diagnosis of SIRS (systemic inflammatory response syndrome).  She presents with the following performance deficits affecting function: weakness, impaired endurance, impaired self care skills, impaired functional mobilty, gait instability, impaired balance, decreased upper extremity function, pain.      Rehab Prognosis: Good; patient would benefit from acute skilled OT services to address these deficits and reach maximum level of function.         Clinical Decision Makin.  OT Mod:  "Pt evaluation falls under moderate complexity for evaluation coding due to identification of 3-5 performance deficits noted as stated above. Eval required Min/Mod assistance to complete on this date and detailed assessment(s) were utilized. Moreover, an " "expanded review of history and occupational profile obtained with additional review of cognitive, physical and psychosocial hx."     Plan:     Patient to be seen 3 x/week to address the above listed problems via self-care/home management, therapeutic exercises, therapeutic activities  · Plan of Care Expires: 12/03/18  · Plan of Care Reviewed with: patient    This Plan of care has been discussed with the patient who was involved in its development and understands and is in agreement with the identified goals and treatment plan    GOALS:   Multidisciplinary Problems     Occupational Therapy Goals        Problem: Occupational Therapy Goal    Goal Priority Disciplines Outcome Interventions   Occupational Therapy Goal     OT, PT/OT     Description:  ot goals to be met by 12-3-18  sba with ue dressing  Min a le dressing  Pt will tolerate 1 set x 15 reps b  ue rom exercise in all available planes and ranges  sba with toilet t/f's                      Time Tracking:     OT Date of Treatment: 11/26/18  OT Start Time: 1055  OT Stop Time: 1120  OT Total Time (min): 25 min    Billable Minutes:Evaluation 10 minutes  Therapeutic Activity 15 minutes    Gissell Holland OT  11/26/2018    "

## 2018-11-26 NOTE — PROGRESS NOTES
Repeat EKG unchanged from prior. Sinus tachycardia with RBBB. .  Defer to day team for any medication changes.  Patient is on metoprolol succinate 25mg daily.

## 2018-11-26 NOTE — PLAN OF CARE
Problem: Patient Care Overview  Goal: Plan of Care Review  Outcome: Ongoing (interventions implemented as appropriate)  POC discussed with patient, verbalized understanding. Sinus tachy on monitor. VSS. AAO X 3.  Processes slowly.Voids per brief. Patient turns with minimal assist in bed. Fall precautions in place, call bell in reach and working, bed in low and locked position. Patient remains free from falls/injuries. Patient denies needs at this time. Will continue to monitor.

## 2018-11-27 VITALS
BODY MASS INDEX: 37.77 KG/M2 | HEART RATE: 91 BPM | SYSTOLIC BLOOD PRESSURE: 125 MMHG | DIASTOLIC BLOOD PRESSURE: 71 MMHG | RESPIRATION RATE: 16 BRPM | WEIGHT: 213.19 LBS | HEIGHT: 63 IN | TEMPERATURE: 99 F | OXYGEN SATURATION: 96 %

## 2018-11-27 PROBLEM — A59.03 TRICHOMONAL CYSTITIS: Status: ACTIVE | Noted: 2018-11-27

## 2018-11-27 LAB
ANION GAP SERPL CALC-SCNC: 11 MMOL/L
BASOPHILS # BLD AUTO: 0.04 K/UL
BASOPHILS NFR BLD: 0.5 %
BUN SERPL-MCNC: 22 MG/DL
CALCIUM SERPL-MCNC: 8.8 MG/DL
CHLORIDE SERPL-SCNC: 104 MMOL/L
CO2 SERPL-SCNC: 23 MMOL/L
CREAT SERPL-MCNC: 1.3 MG/DL
DIFFERENTIAL METHOD: ABNORMAL
EOSINOPHIL # BLD AUTO: 0.3 K/UL
EOSINOPHIL NFR BLD: 3.4 %
ERYTHROCYTE [DISTWIDTH] IN BLOOD BY AUTOMATED COUNT: 14.5 %
EST. GFR  (AFRICAN AMERICAN): 53 ML/MIN/1.73 M^2
EST. GFR  (NON AFRICAN AMERICAN): 46 ML/MIN/1.73 M^2
GLUCOSE SERPL-MCNC: 88 MG/DL
HCT VFR BLD AUTO: 33 %
HGB BLD-MCNC: 11.1 G/DL
HIV1+2 IGG SERPL QL IA.RAPID: NEGATIVE
LYMPHOCYTES # BLD AUTO: 1.9 K/UL
LYMPHOCYTES NFR BLD: 24.4 %
MAGNESIUM SERPL-MCNC: 1.7 MG/DL
MCH RBC QN AUTO: 31.4 PG
MCHC RBC AUTO-ENTMCNC: 33.6 G/DL
MCV RBC AUTO: 94 FL
MONOCYTES # BLD AUTO: 0.6 K/UL
MONOCYTES NFR BLD: 7 %
NEUTROPHILS # BLD AUTO: 5.1 K/UL
NEUTROPHILS NFR BLD: 64.7 %
PHOSPHATE SERPL-MCNC: 3.6 MG/DL
PLATELET # BLD AUTO: 297 K/UL
PMV BLD AUTO: 10.5 FL
POTASSIUM SERPL-SCNC: 3.9 MMOL/L
PROCALCITONIN SERPL IA-MCNC: 0.17 NG/ML
RBC # BLD AUTO: 3.53 M/UL
RPR SER QL: REACTIVE
RPR SER-TITR: ABNORMAL {TITER}
SODIUM SERPL-SCNC: 138 MMOL/L
WBC # BLD AUTO: 7.88 K/UL

## 2018-11-27 PROCEDURE — 84100 ASSAY OF PHOSPHORUS: CPT

## 2018-11-27 PROCEDURE — 63600175 PHARM REV CODE 636 W HCPCS: Performed by: HOSPITALIST

## 2018-11-27 PROCEDURE — 86703 HIV-1/HIV-2 1 RESULT ANTBDY: CPT

## 2018-11-27 PROCEDURE — 80048 BASIC METABOLIC PNL TOTAL CA: CPT

## 2018-11-27 PROCEDURE — 86593 SYPHILIS TEST NON-TREP QUANT: CPT

## 2018-11-27 PROCEDURE — A4216 STERILE WATER/SALINE, 10 ML: HCPCS | Performed by: NURSE PRACTITIONER

## 2018-11-27 PROCEDURE — 86592 SYPHILIS TEST NON-TREP QUAL: CPT

## 2018-11-27 PROCEDURE — 97110 THERAPEUTIC EXERCISES: CPT

## 2018-11-27 PROCEDURE — 25000003 PHARM REV CODE 250: Performed by: HOSPITALIST

## 2018-11-27 PROCEDURE — 25000003 PHARM REV CODE 250: Performed by: NURSE PRACTITIONER

## 2018-11-27 PROCEDURE — G8978 MOBILITY CURRENT STATUS: HCPCS | Mod: CJ

## 2018-11-27 PROCEDURE — 84145 PROCALCITONIN (PCT): CPT

## 2018-11-27 PROCEDURE — 25000003 PHARM REV CODE 250: Performed by: INTERNAL MEDICINE

## 2018-11-27 PROCEDURE — 83735 ASSAY OF MAGNESIUM: CPT

## 2018-11-27 PROCEDURE — 85025 COMPLETE CBC W/AUTO DIFF WBC: CPT

## 2018-11-27 PROCEDURE — 36415 COLL VENOUS BLD VENIPUNCTURE: CPT

## 2018-11-27 PROCEDURE — 97116 GAIT TRAINING THERAPY: CPT

## 2018-11-27 PROCEDURE — G8979 MOBILITY GOAL STATUS: HCPCS | Mod: CI

## 2018-11-27 RX ORDER — METRONIDAZOLE 500 MG/1
2000 TABLET ORAL ONCE
Status: COMPLETED | OUTPATIENT
Start: 2018-11-27 | End: 2018-11-27

## 2018-11-27 RX ADMIN — ISOSORBIDE MONONITRATE 60 MG: 60 TABLET, EXTENDED RELEASE ORAL at 08:11

## 2018-11-27 RX ADMIN — PANTOPRAZOLE SODIUM 40 MG: 40 TABLET, DELAYED RELEASE ORAL at 08:11

## 2018-11-27 RX ADMIN — ATORVASTATIN CALCIUM 80 MG: 40 TABLET, FILM COATED ORAL at 08:11

## 2018-11-27 RX ADMIN — APIXABAN 5 MG: 2.5 TABLET, FILM COATED ORAL at 08:11

## 2018-11-27 RX ADMIN — METOPROLOL SUCCINATE 25 MG: 25 TABLET, EXTENDED RELEASE ORAL at 08:11

## 2018-11-27 RX ADMIN — OXYBUTYNIN CHLORIDE 5 MG: 5 TABLET ORAL at 08:11

## 2018-11-27 RX ADMIN — METRONIDAZOLE 2000 MG: 500 TABLET, FILM COATED ORAL at 10:11

## 2018-11-27 RX ADMIN — POLYETHYLENE GLYCOL 3350 17 G: 17 POWDER, FOR SOLUTION ORAL at 08:11

## 2018-11-27 RX ADMIN — Medication 3 ML: at 06:11

## 2018-11-27 RX ADMIN — ASPIRIN 81 MG: 81 TABLET, COATED ORAL at 08:11

## 2018-11-27 RX ADMIN — NIFEDIPINE 30 MG: 30 TABLET, FILM COATED, EXTENDED RELEASE ORAL at 08:11

## 2018-11-27 RX ADMIN — FUROSEMIDE 40 MG: 10 INJECTION, SOLUTION INTRAMUSCULAR; INTRAVENOUS at 08:11

## 2018-11-27 NOTE — PLAN OF CARE
Dec5 Surgical Consultation with Fransico Steele MD   Wednesday Dec 5, 2018 9:00 AM  [facility]   [address] [place] [address] Dr [address] Floor  [facility] [id number] [phone]      MELL spoke to Jaqueline with [facility].  Orders were received for PT/OT.  They will contact patient to schedule appointments       11/27/18 3993   Final Note   Assessment Type Final Discharge Note   Anticipated Discharge Disposition (Outpatient therapy)   Hospital Follow Up  Appt(s) scheduled? Yes   Right Care Referral Info   Post Acute Recommendation Other   Facility Name [facility] [address] [place], State [place], [place]

## 2018-11-27 NOTE — PROGRESS NOTES
[facility] Hospital Medicine  Progress Note    Patient Name: Tri Staton  MRN: [id number]  Patient Class: IP- Inpatient   Admission Date: 11/23/2018  Length of Stay: 2 days  Attending Physician: Lucas Rincon MD  Primary Care Provider: Primary Doctor No        Subjective:     Principal Problem:SIRS (systemic inflammatory response syndrome)    HPI:  52 y/o female with PMHx of stroke, HTN, HLD who presented to the ED with c/o SOB that onset gradually 3 days ago. associated symptoms include constipation, fever, cough, and generalized weakness. Symptoms are constant and moderate. No exacerbating or mitigating factors reported. Pt denies chills, CP, palpitations, ankle edema, LOC, and all other symptoms at this time. ED workup shows H/H 11.2/33.1, Na+ 135, pro calcitonin 0.46, CT chest shows Cardiomegaly and moderate pericardial effusion. Patient admitted to telemetry for SIRS under the care of [facility].  She is a full code and her surrogate decision maker is her mother, Stephie Rodriguez.    Hospital Course:  Admitted for evaluation and treatment of shortness of breath, weakness, and fatigue.  CT of the chest negative for pneumonia or pulmonary embolism but re-demonstrated moderate pericardial effusion.  Cardiac echo showed no evidence of tamponade and stable moderate affusion.  WBC count remained normal with a mildly elevated procalcitonin of 0.46 and fever up to 101.  She had no dysuria but some urge incontinence.  Developed vomiting after eating so changed her to a trial of clear liquids.  Mild tachycardia overnight with chest pain.  EKG showed sinus rhythm without ischemic changes and troponin 0.008.  Continued to monitor with caution.  She has severe coronary artery disease.  Remained afebrile and cultures negative thus far.  Stopped Vancomycin and Zosyn.    Interval History:  Mild tachycardia overnight with chest pain.  EKG showed sinus rhythm without ischemic changes  and troponin 0.008.  Continued to monitor with caution.  She has severe coronary artery disease.  Remained afebrile and cultures negative thus far.  Stopped Vancomycin and Zosyn.  One episode of vomiting overnight.    Review of Systems   Constitutional: Positive for fatigue. Negative for chills and fever.   HENT: Negative for congestion and sore throat.    Eyes: Negative for visual disturbance.   Respiratory: Positive for shortness of breath. Negative for cough and wheezing.    Cardiovascular: Negative for chest pain, palpitations and leg swelling.   Gastrointestinal: Positive for vomiting. Negative for abdominal pain, blood in stool, constipation, diarrhea and nausea.   Genitourinary: Negative for dysuria and hematuria.   Musculoskeletal: Negative for arthralgias and back pain.   Skin: Negative for rash and wound.   Neurological: Positive for weakness. Negative for dizziness, light-headedness and numbness.   Hematological: Negative for adenopathy.     Objective:     Vital Signs (Most Recent):  Temp: 98.9 °F (37.2 °C) (11/26/18 1537)  Pulse: (!) 116 (11/26/18 1537)  Resp: 18 (11/26/18 1537)  BP: (!) 154/69 (11/26/18 1537)  SpO2: (!) 94 % (11/26/18 1537) Vital Signs (24h Range):  Temp:  [97.9 °F (36.6 °C)-100.3 °F (37.9 °C)] 98.9 °F (37.2 °C)  Pulse:  [101-116] 116  Resp:  [18] 18  SpO2:  [94 %-97 %] 94 %  BP: (100-154)/(57-76) 154/69     Weight: 98.7 kg (217 lb 9.5 oz)  Body mass index is 38.55 kg/m².    Intake/Output Summary (Last 24 hours) at 11/26/2018 1803  Last data filed at 11/26/2018 0526  Gross per 24 hour   Intake 320 ml   Output --   Net 320 ml      Physical Exam   Constitutional: She is oriented to person, place, and time. She appears well-developed and well-nourished. No distress.   HENT:   Head: Normocephalic and atraumatic.   Mouth/Throat: Oropharynx is clear and moist.   Eyes: Conjunctivae and EOM are normal. Pupils are equal, round, and reactive to light.   Neck: Neck supple. No JVD present. No  thyromegaly present.   Cardiovascular: Normal rate and regular rhythm. Exam reveals no gallop and no friction rub.   No murmur heard.  Pulmonary/Chest: Effort normal and breath sounds normal. She has no wheezes. She has no rales.   Abdominal: Soft. Bowel sounds are normal. She exhibits no distension. There is no tenderness. There is no rebound and no guarding.   Musculoskeletal: Normal range of motion. She exhibits no edema or deformity.   Lymphadenopathy:     She has no cervical adenopathy.   Neurological: She is alert and oriented to person, place, and time. She has normal reflexes.   Skin: Skin is warm and dry. No rash noted.   Psychiatric: She has a normal mood and affect. Her behavior is normal. Judgment and thought content normal.   Nursing note and vitals reviewed.      Significant Labs: All pertinent labs within the past 24 hours have been reviewed.    Significant Imaging: I have reviewed all pertinent imaging results/findings within the past 24 hours.    Assessment/Plan:      * SIRS (systemic inflammatory response syndrome)    Temp 101.3, pulse 110 on admission and treated with IV Vancomycin and Zosyn.  Cultures drawn and pending but remain negative.  May have viral syndrome.  Stopped antibiotics.  Repeat urinalysis.     Hypercoagulable state    Had DVT on 9/16/18 - currently anticoagulated with eliquis.     KORI (acute kidney injury)    Mild - BUN, Cr WNL on admit - eGFR decreased to 49, normal >60.  IVF's given in ED.  Interval improvement each day.     Late effects of CVA (cerebrovascular accident)    L sided weakness from old CVA.  PT/OT to eval and tx.  Continue daily statin, ASA, and eliquis.     Mixed hyperlipidemia    Continue atorvastatin.     Essential hypertension    Continue hydralazine, isosorbide, metoprolol, nifedipine.     Pericardial effusion    Moderate Pericardial effusion noted on chest CT.  Cardiac echo showed stable size and no tamponade.  Supplemental oxygen to maintain sats.      Coronary artery disease of native artery of native heart with stable angina pectoris    Continue daily ASA, eliquis, atorvastatin.  Cardiac echo shows stable moderate effusion and no interval changes.       VTE Risk Mitigation (From admission, onward)        Ordered     apixaban tablet 5 mg  2 times daily      11/24/18 0901              Lucas Rincon MD  Department of Hospital Medicine   [facility]

## 2018-11-27 NOTE — PLAN OF CARE
Problem: Physical Therapy Goal  Goal: Physical Therapy Goal  LTGs to be met by 12/3/18  1. Pt will perform bed mobility with SBA  2. Pt will perform t/fs with Supervision  3. Pt will ambulate using RW 150ft with SBA   Outcome: Ongoing (interventions implemented as appropriate)  PT GT TRAINED WITH RW AND CGA X 125'

## 2018-11-27 NOTE — ASSESSMENT & PLAN NOTE
Temp 101.3, pulse 110 on admission and treated with IV Vancomycin and Zosyn.  Cultures drawn and pending but remain negative.  May have viral syndrome.  Stopped antibiotics.  Repeat urinalysis.

## 2018-11-27 NOTE — PLAN OF CARE
Problem: Patient Care Overview  Goal: Plan of Care Review  Outcome: Ongoing (interventions implemented as appropriate)  POC reviewed, verbalized understanding. Pt remained free from falls, fall precautions in place. Pt is NS-ST on monitor, 90-100s. VS monitored. No other c/o at this time. PIV intact, saline locked.  Call bell and personal belongings within reach. Hourly rounding complete. Reminded to call for assistance. Will continue to monitor.

## 2018-11-27 NOTE — SUBJECTIVE & OBJECTIVE
Interval History:  Mild tachycardia overnight with chest pain.  EKG showed sinus rhythm without ischemic changes and troponin 0.008.  Continued to monitor with caution.  She has severe coronary artery disease.  Remained afebrile and cultures negative thus far.  Stopped Vancomycin and Zosyn.  One episode of vomiting overnight.    Review of Systems   Constitutional: Positive for fatigue. Negative for chills and fever.   HENT: Negative for congestion and sore throat.    Eyes: Negative for visual disturbance.   Respiratory: Positive for shortness of breath. Negative for cough and wheezing.    Cardiovascular: Negative for chest pain, palpitations and leg swelling.   Gastrointestinal: Positive for vomiting. Negative for abdominal pain, blood in stool, constipation, diarrhea and nausea.   Genitourinary: Negative for dysuria and hematuria.   Musculoskeletal: Negative for arthralgias and back pain.   Skin: Negative for rash and wound.   Neurological: Positive for weakness. Negative for dizziness, light-headedness and numbness.   Hematological: Negative for adenopathy.     Objective:     Vital Signs (Most Recent):  Temp: 98.9 °F (37.2 °C) (11/26/18 1537)  Pulse: (!) 116 (11/26/18 1537)  Resp: 18 (11/26/18 1537)  BP: (!) 154/69 (11/26/18 1537)  SpO2: (!) 94 % (11/26/18 1537) Vital Signs (24h Range):  Temp:  [97.9 °F (36.6 °C)-100.3 °F (37.9 °C)] 98.9 °F (37.2 °C)  Pulse:  [101-116] 116  Resp:  [18] 18  SpO2:  [94 %-97 %] 94 %  BP: (100-154)/(57-76) 154/69     Weight: 98.7 kg (217 lb 9.5 oz)  Body mass index is 38.55 kg/m².    Intake/Output Summary (Last 24 hours) at 11/26/2018 1803  Last data filed at 11/26/2018 0526  Gross per 24 hour   Intake 320 ml   Output --   Net 320 ml      Physical Exam   Constitutional: She is oriented to person, place, and time. She appears well-developed and well-nourished. No distress.   HENT:   Head: Normocephalic and atraumatic.   Mouth/Throat: Oropharynx is clear and moist.   Eyes: Conjunctivae  and EOM are normal. Pupils are equal, round, and reactive to light.   Neck: Neck supple. No JVD present. No thyromegaly present.   Cardiovascular: Normal rate and regular rhythm. Exam reveals no gallop and no friction rub.   No murmur heard.  Pulmonary/Chest: Effort normal and breath sounds normal. She has no wheezes. She has no rales.   Abdominal: Soft. Bowel sounds are normal. She exhibits no distension. There is no tenderness. There is no rebound and no guarding.   Musculoskeletal: Normal range of motion. She exhibits no edema or deformity.   Lymphadenopathy:     She has no cervical adenopathy.   Neurological: She is alert and oriented to person, place, and time. She has normal reflexes.   Skin: Skin is warm and dry. No rash noted.   Psychiatric: She has a normal mood and affect. Her behavior is normal. Judgment and thought content normal.   Nursing note and vitals reviewed.      Significant Labs: All pertinent labs within the past 24 hours have been reviewed.    Significant Imaging: I have reviewed all pertinent imaging results/findings within the past 24 hours.

## 2018-11-27 NOTE — PT/OT/SLP PROGRESS
Physical Therapy  Treatment    Tri Staton   MRN: [id number]   Admitting Diagnosis: SIRS (systemic inflammatory response syndrome)    PT Received On: 11/27/18  PT Start Time: 0910     PT Stop Time: 0935    PT Total Time (min): 25 min       Billable Minutes:  Gait Training 15 and Therapeutic Exercise 10    Treatment Type: Treatment  PT/PTA: PT             General Precautions: Standard, fall  Orthopedic Precautions: N/A   Braces: N/A         Subjective:  Communicated with NURSE QUIROS AND Epic CHART REVIEW prior to session.   PT AGREED TO TX     Pain/Comfort  Pain Rating 1: 0/10  Pain Rating Post-Intervention 1: 0/10    Objective:   Patient found with: peripheral IV, telemetry    Functional Mobility:  PT MET IN RM SUP>SIT EOB WITH CGA. PT STOOD WITH RW AND CGA FOR GT X 125' WITH RW AND CGA. PT RETURNED TO RM T/F TO CHAIR WITH RW AND CGA. PT COMPLETED BLE TE X 10 REPS WITH LIMITED ROM AP, TKE, AND MIP. PT LEFT SEATED IN CHAIR WITH CALL BELL IN REACH AND ALL NEEDS MET.     AM-PAC 6 CLICK MOBILITY  How much help from another person does this patient currently need?   1 = Unable, Total/Dependent Assistance  2 = A lot, Maximum/Moderate Assistance  3 = A little, Minimum/Contact Guard/Supervision  4 = None, Modified [place]/Independent    Turning over in bed (including adjusting bedclothes, sheets and blankets)?: 3  Sitting down on and standing up from a chair with arms (e.g., wheelchair, bedside commode, etc.): 3  Moving from lying on back to sitting on the side of the bed?: 3  Moving to and from a bed to a chair (including a wheelchair)?: 3  Need to walk in hospital room?: 3  Climbing 3-5 steps with a railing?: 1  Basic Mobility Total Score: 16    AM-PAC Raw Score CMS G-Code Modifier Level of Impairment Assistance   6 % Total / Unable   7 - 9 CM 80 - 100% Maximal Assist   10 - 14 CL 60 - 80% Moderate Assist   15 - 19 CK 40 - 60% Moderate Assist   20 - 22 CJ 20 - 40% Minimal Assist   23 CI 1-20% SBA /  CGA   24 CH 0% Independent/ Mod I     Patient left up in chair with call button in reach.    Assessment:  PT PROGRESSING WITH GT.     Rehab identified problem list/impairments: Rehab identified problem list/impairments: weakness, impaired endurance, impaired functional mobilty, gait instability, decreased lower extremity function, impaired balance, impaired self care skills, decreased ROM, pain    Rehab potential is good.    Activity tolerance: Fair    Discharge recommendations: Discharge Facility/Level Of Care Needs: home health PT     Barriers to discharge:      Equipment recommendations: Equipment Needed After Discharge: none     GOALS:   Multidisciplinary Problems     Physical Therapy Goals        Problem: Physical Therapy Goal    Goal Priority Disciplines Outcome Goal Variances Interventions   Physical Therapy Goal     PT, PT/OT Ongoing (interventions implemented as appropriate)     Description:  LTGs to be met by 12/3/18  1. Pt will perform bed mobility with SBA  2. Pt will perform t/fs with Supervision  3. Pt will ambulate using RW 150ft with SBA                    PLAN:    Patient to be seen    to address the above listed problems via gait training, therapeutic activities, therapeutic exercises  Plan of Care expires: 12/03/18  Plan of Care reviewed with: patient    PT G-Codes  Mobility: Walking and Moving Around Current Status (): CAYETANO  Mobility: Walking and Moving Around Goal Status (): EBENEZER Alexander, PT  11/27/2018

## 2018-11-29 ENCOUNTER — TELEPHONE (OUTPATIENT)
Dept: CARDIOLOGY | Facility: CLINIC | Age: 53
End: 2018-11-29

## 2018-11-29 ENCOUNTER — PATIENT OUTREACH (OUTPATIENT)
Dept: ADMINISTRATIVE | Facility: CLINIC | Age: 53
End: 2018-11-29

## 2018-11-29 LAB
BACTERIA BLD CULT: NORMAL
BACTERIA BLD CULT: NORMAL

## 2018-11-29 NOTE — PATIENT INSTRUCTIONS
Sepsis     To treat sepsis, antibiotics and fluids may by given through an intravenous (IV) line.     Sepsis happens when your body responds with widespread inflammation to a bad infection or bacteremia--the presence of bacteria in your bloodstream. Sepsis can be deadly. Blood pressure may drop and the lungs and kidneys may start to fail. Emergency care for sepsis is crucial.  Risk factors  Those most at risk for sepsis are:  · Infants or older adults  · People who have an illness that weakens their immune system, such as cancer, AIDS, or diabetes  · People being treated with chemotherapy medicines or radiation, which weakens the immune system  · People who have had a transplant  · People with a very severe infection such as pneumonia, meningitis, or a urinary tract infection  When to go to the emergency department (ED)  Sepsis is an emergency. Go to the nearest ED if you have a fever with any of these symptoms:  · Chills and shaking  · Rapid heartbeat and breathing  · Trouble breathing  · Severe nausea or uncontrolled vomiting  · Confusion, disorientation, drowsiness, or dizziness  · Decreased urination  · Severe pain, including in the back or joints   What to expect in the ED  · Blood and urine tests are done to look for bacteria. They also check for organ failure.  · Blood, urine, or sputum cultures may be taken. The samples are sent to a lab. They are placed in a special container. Any bacteria should grow in 24 hours.  · X-rays or other imaging tests may be done.  A person with sepsis will be admitted to the hospital and treated with antibiotics. Treatment may also include oxygen and intravenous fluids.  Date Last Reviewed: 10/1/2016  © 7523-2094 [employer]. [address] [place] [address], [place] [address]. All rights reserved. This information is not intended as a substitute for professional medical care. Always follow your healthcare professional's instructions.

## 2018-11-29 NOTE — TELEPHONE ENCOUNTER
----- Message from Patt Wheeler RN sent at 11/29/2018 11:16 AM CST -----  Hi,  I did a post d\c follow up with above pt. She needs clarifications on meds: Metoprolol and Imdur. According to med rec last noted dose of metoprolol was 12.5 mg daily. Mom states has bottle of 25 mg so I don't know if the dose was decreased and pt was not taking right dose or if it has been increased to 25 mg again. Also, on Imdur filled on 11\18 bottle says 60 mg daily. On 11\19 rx for bid. Please clarify correct dose and advise pt and update med rec where needed.  Thanks,  Patt Wheeler RN

## 2018-11-29 NOTE — PROGRESS NOTES
Pt has different doses of meds at home than what is on med rec. Metoprolol has both 12.5 mg and 25 mg on med rec with last dose being noted as 12.5 mg. Imdur was filled 11\18 and states 60 mg daily, med rec has ordered 11\19 to take bid. Pt instructed to call prescribing MD who she identifies as her cardiologist Dr. Weldon and verify which doses to take. Message routed also. Also, pt states she doesn't have Ditropan at home and want to know if she should continue. Historical provider. Instructed to call pcp and clarify since may have been d\c at some point.

## 2018-11-29 NOTE — TELEPHONE ENCOUNTER
Spoke with pt with instructions on Imdur and Metoprolol. Called pharmacy and clarified Imdur 60mg BID and Metoprolol 25mg daily.

## 2018-11-30 NOTE — DISCHARGE SUMMARY
[facility] Hospital Medicine  Discharge Summary      Patient Name: Tri Staton  MRN: [id number]  Admission Date: 11/23/2018  Hospital Length of Stay: 4 days  Discharge Date and Time: 11/27/2018  4:05 PM  Attending Physician:  Lucas Rincon MD  Discharging Provider: Lucas Rincon MD  Primary Care Provider: Provider Notinsystem      HPI:   54 y/o female with PMHx of stroke, HTN, HLD who presented to the ED with c/o SOB that onset gradually 3 days ago. associated symptoms include constipation, fever, cough, and generalized weakness. Symptoms are constant and moderate. No exacerbating or mitigating factors reported. Pt denies chills, CP, palpitations, ankle edema, LOC, and all other symptoms at this time. ED workup shows H/H 11.2/33.1, Na+ 135, pro calcitonin 0.46, CT chest shows Cardiomegaly and moderate pericardial effusion. Patient admitted to telemetry for SIRS under the care of [facility].  She is a full code and her surrogate decision maker is her mother, Stephie Rodriguez.    * No surgery found *      Hospital Course:   Admitted for evaluation and treatment of shortness of breath, weakness, and fatigue.  CT of the chest negative for pneumonia or pulmonary embolism but re-demonstrated moderate pericardial effusion.  Cardiac echo showed no evidence of tamponade and stable moderate affusion.  WBC count remained normal with a mildly elevated procalcitonin of 0.46 and fever up to 101.  She had no dysuria but some urge incontinence.  Developed vomiting after eating so changed her to a trial of clear liquids.  Mild tachycardia overnight with chest pain.  EKG showed sinus rhythm without ischemic changes and troponin 0.008.  Continued to monitor with caution.  She has severe coronary artery disease.  Remained afebrile and cultures negative thus far.  Stopped Vancomycin and Zosyn.  Repeat urinalysis revealed Trichomonas.  Treated with single dose Metronidazole 2 g.  Byron HIV,  RPR, GC, and Chlamydia - Pending results at discharge.  Discharge plan to return home and follow up with PCP.  Referral for outpatient PT/OT.     Consults:   Consults (From admission, onward)        Status Ordering Provider     Inpatient consult to Social Work/Case Management  Once     Provider:  (Not yet assigned)    Completed MARY CLINE     IP consult to case management  Once     Provider:  (Not yet assigned)    Completed MARY CLINE          No new Assessment & Plan notes have been filed under this hospital service since the last note was generated.  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    Why:  Outpatient Services- therapy  Contact information:  [address] [facility] [place]  [address]  [phone]               Patient Instructions:      Ambulatory Referral to Physical/Occupational Therapy   Referral Priority: Routine Referral Type: Physical Medicine   Referral Reason: Specialty Services Required   Requested Specialty: Physical Therapy   Number of Visits Requested: 1     Ambulatory Referral to Physical/Occupational Therapy   Referral Priority: Routine Referral Type: Physical Medicine   Referral  Reason: Specialty Services Required   Requested Specialty: Occupational Therapy   Number of Visits Requested: 1     Diet Cardiac     Activity as tolerated       Significant Diagnostic Studies: Labs: All labs within the past 24 hours have been reviewed    Pending Diagnostic Studies:     None         Medications:  Reconciled Home Medications:      Medication List      CONTINUE taking these medications    amitriptyline 50 MG tablet  Commonly known as:  ELAVIL  Take 50 mg by mouth every evening.     aspirin 81 MG EC tablet  Commonly known as:  ECOTRIN  Take 81 mg by mouth once daily.     atorvastatin 80 MG tablet  Commonly known as:  LIPITOR  Take 80 mg by mouth once daily.     docusate sodium 100 MG capsule  Commonly known as:  COLACE  Take 1 capsule (100 mg total) by mouth 2 (two) times daily as needed for Constipation.     ELIQUIS 5 mg Tab  Generic drug:  apixaban  Take 5 mg by mouth 2 (two) times daily.     gabapentin 600 MG tablet  Commonly known as:  NEURONTIN  Take 600 mg by mouth 3 (three) times daily.     hydrocortisone 2.5 % cream  Apply topically 2 (two) times daily. for 7 days     isosorbide mononitrate 60 MG 24 hr tablet  Commonly known as:  IMDUR  Take 1 tablet (60 mg total) by mouth 2 (two) times daily.     lisinopril 40 MG tablet  Commonly known as:  PRINIVIL,ZESTRIL  Take 40 mg by mouth once daily.     metoprolol succinate 25 MG 24 hr tablet  Commonly known as:  TOPROL-XL  Take 1 tablet (25 mg total) by mouth once daily.     NIFEdipine 30 MG Tbsr  Commonly known as:  ADALAT CC  Take 1 tablet (30 mg total) by mouth once daily.     nitroGLYCERIN 0.4 MG SL tablet  Commonly known as:  NITROSTAT  Place 1 tablet (0.4 mg total) under the tongue every 5 (five) minutes as needed for Chest pain.     oxybutynin 5 MG Tab  Commonly known as:  DITROPAN  Take 5 mg by mouth 2 (two) times daily.        STOP taking these medications    hydrALAZINE 25 MG tablet  Commonly known as:  APRESOLINE     levoFLOXacin 750 MG  tablet  Commonly known as:  LEVAQUIN            Indwelling Lines/Drains at time of discharge:   Lines/Drains/Airways          None          Time spent on the discharge of patient: 30 minutes  Patient was seen and examined on the date of discharge and determined to be suitable for discharge.         Lucas Rincon MD  Department of Hospital Medicine  [facility]

## 2018-12-05 ENCOUNTER — SURGICAL CONSULT (OUTPATIENT)
Dept: CARDIOTHORACIC SURGERY | Facility: CLINIC | Age: 53
End: 2018-12-05
Payer: MEDICAID

## 2018-12-05 VITALS
SYSTOLIC BLOOD PRESSURE: 106 MMHG | TEMPERATURE: 98 F | BODY MASS INDEX: 37.26 KG/M2 | WEIGHT: 210.31 LBS | HEART RATE: 96 BPM | DIASTOLIC BLOOD PRESSURE: 80 MMHG

## 2018-12-05 DIAGNOSIS — I25.10 CORONARY ARTERY DISEASE INVOLVING NATIVE CORONARY ARTERY OF NATIVE HEART WITHOUT ANGINA PECTORIS: Primary | ICD-10-CM

## 2018-12-05 PROCEDURE — 99205 OFFICE O/P NEW HI 60 MIN: CPT | Mod: S$PBB,,, | Performed by: THORACIC SURGERY (CARDIOTHORACIC VASCULAR SURGERY)

## 2018-12-05 NOTE — H&P
[facility] - Cardiothoracic Surgery  Cardiothoracic Surgery  History & Physical    Patient Name: Tri Staton  MRN: [id number]  Admission Date: (Not on file)  Attending Physician: No att. providers found  Referring Provider: No ref. provider found    Patient information was obtained from patient, relative(s) and past medical records.     Subjective:     Chief Complaint/Reason for Admission:  Patient with a history of coronary artery disease presents to the clinic for assessment regarding possibility of coronary artery bypass grafting.  History of Present Illness:  The patient is a  53-year-old female with complex past medical history including DVT, pericardial effusion, acute kidney injury, sirs, CVA and status post myocardial infarction (as per the patient) in August of this year while she was in [place].  She has since moved back to the [facility] and now presents for evaluation for possible coronary artery bypass grafting.  The patient denies any symptoms.  She denies any chest pain, chest pressure, shoulder pain, jaw pain, shortness of breath, dyspnea on exertion, indigestion, palpitations, paroxysmal nocturnal dyspnea, orthopnea, or dizziness or ankle swelling. In addition she denies any fever or chills or cough productive of sputum.    Current Outpatient Medications on File Prior to Visit   Medication Sig    amitriptyline (ELAVIL) 50 MG tablet Take 50 mg by mouth every evening.    apixaban (ELIQUIS) 5 mg Tab Take 5 mg by mouth 2 (two) times daily.    aspirin (ECOTRIN) 81 MG EC tablet Take 81 mg by mouth once daily.    atorvastatin (LIPITOR) 80 MG tablet Take 80 mg by mouth once daily.    docusate sodium (COLACE) 100 MG capsule Take 1 capsule (100 mg total) by mouth 2 (two) times daily as needed for Constipation.    gabapentin (NEURONTIN) 600 MG tablet Take 600 mg by mouth 3 (three) times daily.    isosorbide mononitrate (IMDUR) 60 MG 24 hr tablet Take 1 tablet (60 mg total) by mouth 2 (two)  times daily.    lisinopril (PRINIVIL,ZESTRIL) 40 MG tablet Take 40 mg by mouth once daily.    metoprolol succinate (TOPROL-XL) 25 MG 24 hr tablet Take 1 tablet (25 mg total) by mouth once daily. (Patient taking differently: Take 25 mg by mouth once daily. )    NIFEdipine (ADALAT CC) 30 MG TbSR Take 1 tablet (30 mg total) by mouth once daily.    nitroGLYCERIN (NITROSTAT) 0.4 MG SL tablet Place 1 tablet (0.4 mg total) under the tongue every 5 (five) minutes as needed for Chest pain.    oxybutynin (DITROPAN) 5 MG Tab Take 5 mg by mouth 2 (two) times daily.    hydrocortisone 2.5 % cream Apply topically 2 (two) times daily. for 7 days     No current facility-administered medications on file prior to visit.        Review of patient's allergies indicates:  No Known Allergies    Past Medical History:   Diagnosis Date    Coronary artery disease     Coronary artery disease of native artery of native heart with stable angina pectoris 2018    Hypercholesteremia     Hypertension     Neuropathy     Stroke      Past Surgical History:   Procedure Laterality Date     SECTION       Family History     Problem Relation (Age of Onset)    Hypertension Mother        Tobacco Use    Smoking status: Former Smoker    Smokeless tobacco: Never Used   Substance and Sexual Activity    Alcohol use: No     Frequency: Never    Drug use: No    Sexual activity: No     Partners: Male     Review of Systems   Constitutional: Positive for activity change and fatigue. Negative for appetite change, chills, diaphoresis, fever and unexpected weight change.   HENT: Negative for congestion, ear pain, postnasal drip and rhinorrhea.    Eyes: Negative for pain, discharge and visual disturbance.   Respiratory: Negative for cough, chest tightness, shortness of breath and wheezing.    Cardiovascular: Negative for chest pain, palpitations and leg swelling.   Gastrointestinal: Negative for abdominal distention, abdominal pain, anal  bleeding, blood in stool, constipation and diarrhea.   Endocrine: Negative for cold intolerance, heat intolerance and polyuria.   Genitourinary: Negative for difficulty urinating, dysuria and hematuria.   Musculoskeletal: Positive for gait problem. Negative for arthralgias, back pain and joint swelling.   Allergic/Immunologic: Negative for food allergies.   Neurological: Positive for facial asymmetry. Negative for dizziness, light-headedness and headaches.   Hematological: Does not bruise/bleed easily.   Psychiatric/Behavioral: Negative for agitation and behavioral problems.     Objective:     Vital Signs (Most Recent):  Temp: 98.1 °F (36.7 °C) (12/05/18 0915)  Pulse: 96 (12/05/18 0915)  BP: 106/80 (12/05/18 0915) Vital Signs (24h Range):  [unfilled]     Weight: 95.4 kg (210 lb 5.1 oz)  Body mass index is 37.26 kg/m².             [unfilled]     Lines/Drains/Airways          None           STS Risk Score:   Risk of Mortality: 1.418%   Renal Failure: 2.812%   Permanent Stroke: 3.928%   Prolonged Ventilation: 9.765%   DSW Infection: 0.519%   Reoperation: 1.231%   Morbidity or Mortality: 14.159%   Short Length of Stay: 25.921%   Long Length of Stay: 8.514%      Physical Exam   Constitutional: She is oriented to person, place, and time. She appears well-developed and well-nourished. No distress.   HENT:   Head: Normocephalic and atraumatic.   Mouth/Throat: Oropharynx is clear and moist.   Eyes: EOM are normal. Pupils are equal, round, and reactive to light.   Neck: Normal range of motion. Neck supple. No JVD present.   Cardiovascular: Normal rate, regular rhythm and normal heart sounds.   Pulmonary/Chest: Effort normal and breath sounds normal.   Abdominal: Soft. Bowel sounds are normal. She exhibits no distension. There is no tenderness.   Musculoskeletal: She exhibits no edema or deformity.   Neurological: She is alert and oriented to person, place, and time.   Lt sided weakness   Skin: Skin is warm and dry. She is  not diaphoretic.   Psychiatric: She has a normal mood and affect.       Significant Labs:  All pertinent labs from the last 24 hours have been reviewed.     Significant Diagnostics:  I have reviewed all pertinent imaging results/findings within the past 24 hours.    Assessment/Plan:     NYHA Score: NYHA II: slight limitation of physical activity, comfortable at rest    There are no hospital problems to display for this patient.   The patient is a 53-year-old female with multiple medical problems which include DVT, SIRS, acute kidney injury, CVA, hypertension, and previous MI who now presents for assessment for possible coronary artery bypass grafting.  The patient is asymptomatic and has an ejection fraction of 60-65% on echocardiogram.  Review of angiogram from [place],  show that her coronaries are diffusely diseased and are small in caliber especially the LAD.  It is my opinion that the small caliber of her coronaries (especially the LAD) make successful revascularization via CABG unlikely to be successful..    Fransico Steele MD  Cardiothoracic Surgery  '[facility] - Cardiothoracic Surgery

## 2018-12-08 ENCOUNTER — NURSE TRIAGE (OUTPATIENT)
Dept: ADMINISTRATIVE | Facility: CLINIC | Age: 53
End: 2018-12-08

## 2018-12-08 NOTE — TELEPHONE ENCOUNTER
Reason for Disposition   Caller has URGENT medication question about med that PCP prescribed and triager unable to answer question    Protocols used: ST MEDICATION QUESTION CALL-A-  Admit 11/23  Pt called re refill of Eliquis.   Pharm on file   Spoke with dr jess Dotson for one month supply for eliquis. Called in at 234pm. Mother notified. Call back with questions,

## 2018-12-10 DIAGNOSIS — I25.118 CORONARY ARTERY DISEASE OF NATIVE ARTERY OF NATIVE HEART WITH STABLE ANGINA PECTORIS: Primary | ICD-10-CM

## 2018-12-10 RX ORDER — ISOSORBIDE MONONITRATE 60 MG/1
60 TABLET, EXTENDED RELEASE ORAL 2 TIMES DAILY
Qty: 60 TABLET | Refills: 5 | Status: SHIPPED | OUTPATIENT
Start: 2018-12-10 | End: 2019-02-25 | Stop reason: SDUPTHER

## 2018-12-10 NOTE — TELEPHONE ENCOUNTER
----- Message from Sania Hernandez sent at 12/10/2018  1:35 PM CST -----  Contact: pt's mom  She's calling to discuss pt's medications, please advise [phone] or [phone]

## 2018-12-12 ENCOUNTER — HOSPITAL ENCOUNTER (EMERGENCY)
Facility: HOSPITAL | Age: 53
Discharge: HOME | End: 2018-12-12
Attending: EMERGENCY MEDICINE
Payer: MEDICAID

## 2018-12-12 VITALS
HEART RATE: 80 BPM | TEMPERATURE: 98 F | OXYGEN SATURATION: 100 % | HEIGHT: 63 IN | SYSTOLIC BLOOD PRESSURE: 111 MMHG | RESPIRATION RATE: 24 BRPM | BODY MASS INDEX: 37.26 KG/M2 | DIASTOLIC BLOOD PRESSURE: 68 MMHG

## 2018-12-12 DIAGNOSIS — M79.604 RIGHT LEG PAIN: ICD-10-CM

## 2018-12-12 DIAGNOSIS — R07.9 CHEST PAIN: ICD-10-CM

## 2018-12-12 LAB
ALBUMIN SERPL BCP-MCNC: 2.8 G/DL
ALP SERPL-CCNC: 73 U/L
ALT SERPL W/O P-5'-P-CCNC: 51 U/L
ANION GAP SERPL CALC-SCNC: 8 MMOL/L
AST SERPL-CCNC: 78 U/L
BASOPHILS # BLD AUTO: 0.03 K/UL
BASOPHILS NFR BLD: 0.8 %
BILIRUB SERPL-MCNC: 0.4 MG/DL
BNP SERPL-MCNC: 19 PG/ML
BUN SERPL-MCNC: 11 MG/DL
CALCIUM SERPL-MCNC: 9.1 MG/DL
CHLORIDE SERPL-SCNC: 111 MMOL/L
CO2 SERPL-SCNC: 22 MMOL/L
CREAT SERPL-MCNC: 1 MG/DL
DIFFERENTIAL METHOD: ABNORMAL
EOSINOPHIL # BLD AUTO: 0.3 K/UL
EOSINOPHIL NFR BLD: 8.4 %
ERYTHROCYTE [DISTWIDTH] IN BLOOD BY AUTOMATED COUNT: 15.3 %
EST. GFR  (AFRICAN AMERICAN): >60 ML/MIN/1.73 M^2
EST. GFR  (NON AFRICAN AMERICAN): >60 ML/MIN/1.73 M^2
GLUCOSE SERPL-MCNC: 104 MG/DL
HCT VFR BLD AUTO: 37.6 %
HGB BLD-MCNC: 12.1 G/DL
LYMPHOCYTES # BLD AUTO: 1.9 K/UL
LYMPHOCYTES NFR BLD: 51.1 %
MCH RBC QN AUTO: 29.7 PG
MCHC RBC AUTO-ENTMCNC: 32.2 G/DL
MCV RBC AUTO: 92 FL
MONOCYTES # BLD AUTO: 0.4 K/UL
MONOCYTES NFR BLD: 10.3 %
NEUTROPHILS # BLD AUTO: 1.1 K/UL
NEUTROPHILS NFR BLD: 29.4 %
PLATELET # BLD AUTO: 226 K/UL
PMV BLD AUTO: 10 FL
POTASSIUM SERPL-SCNC: 4.5 MMOL/L
PROT SERPL-MCNC: 7.3 G/DL
RBC # BLD AUTO: 4.08 M/UL
SODIUM SERPL-SCNC: 141 MMOL/L
TROPONIN I SERPL DL<=0.01 NG/ML-MCNC: <0.006 NG/ML
WBC # BLD AUTO: 3.68 K/UL

## 2018-12-12 PROCEDURE — 84484 ASSAY OF TROPONIN QUANT: CPT

## 2018-12-12 PROCEDURE — 93010 ELECTROCARDIOGRAM REPORT: CPT | Mod: ,,, | Performed by: INTERNAL MEDICINE

## 2018-12-12 PROCEDURE — 85025 COMPLETE CBC W/AUTO DIFF WBC: CPT

## 2018-12-12 PROCEDURE — 36415 COLL VENOUS BLD VENIPUNCTURE: CPT

## 2018-12-12 PROCEDURE — 99285 EMERGENCY DEPT VISIT HI MDM: CPT | Mod: 25

## 2018-12-12 PROCEDURE — 25000003 PHARM REV CODE 250: Performed by: EMERGENCY MEDICINE

## 2018-12-12 PROCEDURE — 80053 COMPREHEN METABOLIC PANEL: CPT

## 2018-12-12 PROCEDURE — 83880 ASSAY OF NATRIURETIC PEPTIDE: CPT

## 2018-12-12 RX ORDER — ASPIRIN 325 MG
325 TABLET ORAL
Status: COMPLETED | OUTPATIENT
Start: 2018-12-12 | End: 2018-12-12

## 2018-12-12 RX ADMIN — ASPIRIN 325 MG ORAL TABLET 325 MG: 325 PILL ORAL at 09:12

## 2018-12-13 NOTE — ED PROVIDER NOTES
SCRIBE #1 NOTE: I, Guido Navarrete, am scribing for, and in the presence of, Arian Walker MD. I have scribed the entire note.       History     Chief Complaint   Patient presents with    Shortness of Breath     patient reports SOB, slight chest pain, and a knot on the back of the right knee     Review of patient's allergies indicates:  No Known Allergies      History of Present Illness     HPI    2018, 9:50 PM  History obtained from the patient      History of Present Illness: Tri Staton is a 53 y.o. female patient with a PMHx of stroke, CAD, and HTN who presents to the Emergency Department for evaluation of SOB which onset gradually yesterday. Pt reports a hx of cardiac stents and states she was told any time she feels SOB or has chest pain to come in for further evaluation. Pt states she feel a lump to the back of her RLE and is concerned it may be a blood clot. Symptoms are constant and moderate in severity. Exacerbated by nothing and relieved by nothing. Associated sxs include mild chest pain and a lump to the back of her right knee. Patient denies any fever, chills, diaphoresis, leg swelling, recent travel, dysuria, hematuria, HA, weakness/numbness, and all other sxs at this time. Pt denies tx  PTA. Pt reports a hx of a DVT to her LLE and is on eliquis. No further complaints or concerns at this time.       Arrival mode: Personal vehicle    PCP: Dale Holland MD        Past Medical History:  Past Medical History:   Diagnosis Date    Coronary artery disease     Coronary artery disease of native artery of native heart with stable angina pectoris 2018    Hypercholesteremia     Hypertension     Neuropathy     Stroke        Past Surgical History:  Past Surgical History:   Procedure Laterality Date     SECTION           Family History:  Family History   Problem Relation Age of Onset    Hypertension Mother        Social History:  Social History     Tobacco Use    Smoking status:  Former Smoker    Smokeless tobacco: Never Used   Substance and Sexual Activity    Alcohol use: No     Frequency: Never    Drug use: No    Sexual activity: No     Partners: Male        Review of Systems     Review of Systems   Constitutional: Negative for chills, diaphoresis and fever.        (-) Recent travel  (-) Long car trips   HENT: Negative for congestion, sore throat and trouble swallowing.    Respiratory: Positive for shortness of breath. Negative for cough, chest tightness and wheezing.    Cardiovascular: Positive for chest pain. Negative for palpitations and leg swelling.   Gastrointestinal: Negative for abdominal pain, nausea and vomiting.   Genitourinary: Negative for dysuria, frequency, hematuria and urgency.   Musculoskeletal: Negative for back pain and neck pain.        (+) Lump to back of right knee   Skin: Negative for rash.   Neurological: Negative for dizziness, weakness, light-headedness and headaches.   Hematological: Does not bruise/bleed easily.   Psychiatric/Behavioral: Negative for confusion.   All other systems reviewed and are negative.     Physical Exam     Initial Vitals [12/12/18 2047]   BP Pulse Resp Temp SpO2   109/66 91 18 98 °F (36.7 °C) 97 %      MAP       --          Physical Exam  Nursing Notes and Vital Signs Reviewed.  Constitutional: Patient is in no acute distress. Well-developed and well-nourished.  Head: Atraumatic. Normocephalic.  Eyes: PERRL. EOM intact. Conjunctivae are not pale. No scleral icterus.  ENT: Mucous membranes are moist. Oropharynx is clear and symmetric.    Neck: Supple. Full ROM. No lymphadenopathy.  Cardiovascular: Regular rate. Regular rhythm. No murmurs, rubs, or gallops. Distal pulses are 2+ and symmetric.  Pulmonary/Chest: No respiratory distress. Clear to auscultation bilaterally. No wheezing or rales.  Abdominal: Soft and non-distended.  There is no tenderness.   Musculoskeletal: Moves all extremities. No obvious deformities. No edema. No calf  "tenderness. Cyst noted to the back f pt's right knee.   Skin: Warm and dry.  Neurological:  Alert, awake, and appropriate.  Normal speech.  4/5 strength to left upper and lower extremities secondary to past CVA. 5/5 strength to right upper and lower extremities.  Psychiatric: Normal affect. Good eye contact. Appropriate in content.     ED Course   Procedures  ED Vital Signs:  Vitals:    12/12/18 2047 12/12/18 2213 12/12/18 2221 12/12/18 2241   BP: 109/66  117/71 115/77   Pulse: 91 84 79 79   Resp: 18  (!) 26 (!) 24   Temp: 98 °F (36.7 °C)      TempSrc: Oral      SpO2: 97%  99% 100%   Height: 5' 3" (1.6 m)       12/12/18 2301 12/12/18 2326   BP: 114/71 111/68   Pulse: 77 80   Resp: (!) 22 (!) 24   Temp:     TempSrc:     SpO2: 100% 100%   Height:         Abnormal Lab Results:  Labs Reviewed   CBC W/ AUTO DIFFERENTIAL - Abnormal; Notable for the following components:       Result Value    WBC 3.68 (*)     RDW 15.3 (*)     Gran # (ANC) 1.1 (*)     Gran% 29.4 (*)     Lymph% 51.1 (*)     Eosinophil% 8.4 (*)     All other components within normal limits   COMPREHENSIVE METABOLIC PANEL - Abnormal; Notable for the following components:    Chloride 111 (*)     CO2 22 (*)     Albumin 2.8 (*)     AST 78 (*)     ALT 51 (*)     All other components within normal limits   TROPONIN I   B-TYPE NATRIURETIC PEPTIDE        All Lab Results:  Results for orders placed or performed during the hospital encounter of 12/12/18   CBC auto differential   Result Value Ref Range    WBC 3.68 (L) 3.90 - 12.70 K/uL    RBC 4.08 4.00 - 5.40 M/uL    Hemoglobin 12.1 12.0 - 16.0 g/dL    Hematocrit 37.6 37.0 - 48.5 %    MCV 92 82 - 98 fL    MCH 29.7 27.0 - 31.0 pg    MCHC 32.2 32.0 - 36.0 g/dL    RDW 15.3 (H) 11.5 - 14.5 %    Platelets 226 150 - 350 K/uL    MPV 10.0 9.2 - 12.9 fL    Gran # (ANC) 1.1 (L) 1.8 - 7.7 K/uL    Lymph # 1.9 1.0 - 4.8 K/uL    Mono # 0.4 0.3 - 1.0 K/uL    Eos # 0.3 0.0 - 0.5 K/uL    Baso # 0.03 0.00 - 0.20 K/uL    Gran% 29.4 (L) " 38.0 - 73.0 %    Lymph% 51.1 (H) 18.0 - 48.0 %    Mono% 10.3 4.0 - 15.0 %    Eosinophil% 8.4 (H) 0.0 - 8.0 %    Basophil% 0.8 0.0 - 1.9 %    Differential Method Automated    Comprehensive metabolic panel   Result Value Ref Range    Sodium 141 136 - 145 mmol/L    Potassium 4.5 3.5 - 5.1 mmol/L    Chloride 111 (H) 95 - 110 mmol/L    CO2 22 (L) 23 - 29 mmol/L    Glucose 104 70 - 110 mg/dL    BUN, Bld 11 6 - 20 mg/dL    Creatinine 1.0 0.5 - 1.4 mg/dL    Calcium 9.1 8.7 - 10.5 mg/dL    Total Protein 7.3 6.0 - 8.4 g/dL    Albumin 2.8 (L) 3.5 - 5.2 g/dL    Total Bilirubin 0.4 0.1 - 1.0 mg/dL    Alkaline Phosphatase 73 55 - 135 U/L    AST 78 (H) 10 - 40 U/L    ALT 51 (H) 10 - 44 U/L    Anion Gap 8 8 - 16 mmol/L    eGFR if African American >60 >60 mL/min/1.73 m^2    eGFR if non African American >60 >60 mL/min/1.73 m^2   Troponin I #1   Result Value Ref Range    Troponin I <0.006 0.000 - 0.026 ng/mL   B-Type natriuretic peptide (BNP)   Result Value Ref Range    BNP 19 0 - 99 pg/mL         Imaging Results:  Imaging Results          X-Ray Chest AP Portable (Final result)  Result time 12/12/18 23:05:24    Final result by Linwood Shepherd MD (Timothy) (12/12/18 23:05:24)                 Impression:      Increased density left retrocardiac area possibly related to technique.  However focal infiltrate or consolidation cannot be excluded.  Follow-up is recommended.      Electronically signed by: Linwood Shepherd MD  Date:    12/12/2018  Time:    23:05             Narrative:    EXAMINATION:  XR CHEST AP PORTABLE    CLINICAL HISTORY:  Chest pain,    COMPARISON:  Comparison 11/23/2018.    FINDINGS:  Stable cardiomegaly.  Apparent increased density left retrocardiac area could be partly related to technique.  However focal infiltrate or consolidation cannot be excluded.  Follow-up recommended.                               US Lower Extremity Veins Right (Final result)  Result time 12/12/18 22:55:37    Final result by Linwood Corbin)  MD Cora (12/12/18 22:55:37)                 Impression:      No evidence of deep venous thrombosis right lower extremity.      Electronically signed by: Linwood Shepherd MD  Date:    12/12/2018  Time:    22:55             Narrative:    EXAMINATION:  US LOWER EXTREMITY VEINS RIGHT    CLINICAL HISTORY:  Pain in right leg    FINDINGS:  Color flow and spectral doppler vascular ultrasound was performed. There is documented compressibility and color Doppler flow left lower extremity with normal venous waveforms and good calf augmentation response.                                    The EKG was ordered, reviewed, and independently interpreted by the ED provider.  Interpretation time: 21:08  Rate: 84 BPM  Rhythm: normal sinus rhythm  Interpretation: Left axis deviation. RBBB. Sepal infarct. No STEMI.    The Emergency Provider reviewed the vital signs and test results, which are outlined above.     ED Discussion     11:14 PM: Reassessed pt at this time. Pt is awake, alert, and in NAD. Pt states her condition has improved at this time. Discussed with pt all pertinent ED information and results. Discussed pt dx and plan of tx. Gave pt all f/u and return to the ED instructions. All questions and concerns were addressed at this time. Pt expresses understanding of information and instructions, and is comfortable with plan to discharge. Pt is stable for discharge.    I discussed with patient and/or family/caretaker that evaluation in the ED does not suggest any emergent or life threatening medical conditions requiring immediate intervention beyond what was provided in the ED, and I believe patient is safe for discharge.  Regardless, an unremarkable evaluation in the ED does not preclude the development or presence of a serious of life threatening condition. As such, patient was instructed to return immediately for any worsening or change in current symptoms.      ED Medication(s):  Medications   aspirin tablet 325 mg (325 mg Oral  Given 12/12/18 3142)       Follow-up Information     Daledayan Holland MD In 2 days.    Specialty:  Family Medicine  Contact information:  [address] [facility] [address] [phone]             [facility] - .    Specialty:  Emergency Medicine  Why:  As needed, If symptoms worsen  Contact information:  [address]  [facility] [address] [phone]                       Medical Decision Making:   Clinical Tests:   Lab Tests: Ordered and Reviewed  Radiological Study: Ordered and Reviewed  Medical Tests: Ordered and Reviewed             Scribe Attestation:   Scribe #1: I performed the above scribed service and the documentation accurately describes the services I performed. I attest to the accuracy of the note.     Attending:   Physician Attestation Statement for Scribe #1: I, Arian Walker MD, personally performed the services described in this documentation, as scribed by Guido Navarrete, in my presence, and it is both accurate and complete.           Clinical Impression       ICD-10-CM ICD-9-CM   1. Chest pain R07.9 786.50   2. Right leg pain M79.604 729.5       Disposition:   Disposition: Discharged  Condition: Stable         Arian Walker MD  12/13/18 0210

## 2018-12-18 RX ORDER — LISINOPRIL 40 MG/1
40 TABLET ORAL DAILY
Qty: 30 TABLET | Refills: 11 | Status: SHIPPED | OUTPATIENT
Start: 2018-12-18 | End: 2019-01-24 | Stop reason: SDUPTHER

## 2018-12-18 NOTE — TELEPHONE ENCOUNTER
----- Message from Elizabet Leong sent at 12/18/2018 10:10 AM CST -----  Contact: Stephie- mother  Refill Request. Pt is out of medication. Please call back at [phone].    1. What is the name of the medication you are requesting? Lisinopril   2. What is the dose? 40 mg  3. How do you take the medication? Orally, topically, etc? orally  4. How often do you take this medication? Once a day  5. Do you need a 30 day or 90 day supply? 30  6. How many refills are you requesting? n/a  7. What is your preferred pharmacy and location of the pharmacy?     Pt uses:    [facility]/pharmacy #[id number] - [place] - [address] [address] [address] AT [address]  [place] [address]  Phone: [phone] Fax: [phone]      8. Who can we contact with further questions? N/a      Thanks,   Elizabet Leong

## 2018-12-22 ENCOUNTER — HOSPITAL ENCOUNTER (OUTPATIENT)
Dept: SLEEP MEDICINE | Facility: HOSPITAL | Age: 53
Discharge: HOME | End: 2018-12-22
Attending: INTERNAL MEDICINE
Payer: MEDICAID

## 2018-12-22 DIAGNOSIS — E11.9 CONTROLLED TYPE 2 DIABETES MELLITUS WITHOUT COMPLICATION, WITHOUT LONG-TERM CURRENT USE OF INSULIN: ICD-10-CM

## 2018-12-22 DIAGNOSIS — G47.33 OSA (OBSTRUCTIVE SLEEP APNEA): ICD-10-CM

## 2018-12-22 DIAGNOSIS — I63.9 CEREBROVASCULAR ACCIDENT (CVA), UNSPECIFIED MECHANISM: ICD-10-CM

## 2018-12-22 DIAGNOSIS — R29.818 SUSPECTED SLEEP APNEA: ICD-10-CM

## 2018-12-22 DIAGNOSIS — I25.118 CORONARY ARTERY DISEASE OF NATIVE ARTERY OF NATIVE HEART WITH STABLE ANGINA PECTORIS: ICD-10-CM

## 2018-12-22 DIAGNOSIS — I10 ESSENTIAL HYPERTENSION: ICD-10-CM

## 2018-12-22 PROCEDURE — 95810 POLYSOM 6/> YRS 4/> PARAM: CPT

## 2018-12-22 PROCEDURE — 95810 POLYSOM 6/> YRS 4/> PARAM: CPT | Mod: 26,,, | Performed by: INTERNAL MEDICINE

## 2018-12-22 NOTE — Clinical Note
Impressions:l Sleep architecture revealed a normal sleep efficiency, normal primary sleep latency, short REMlatency, and normal slow wave latency.l Mild obstructive sleep apnea syndrome (HALIE). AHI: 11.4/hr.l Moderate snoringDiagnosis:l Obstructive Sleep Apnea (G47.33)Recommendations:l Therapeutic CPAP titration to determine optimal pressure required to alleviate sleep disorderedbreathing.l Sleep hygiene should be reviewed to assess factors that may improve sleep quality.l Weight management and regular exercise should be initiated or continued.

## 2018-12-27 DIAGNOSIS — G47.33 OSA (OBSTRUCTIVE SLEEP APNEA): Primary | ICD-10-CM

## 2018-12-27 NOTE — PROCEDURES
"Impressions:  l Sleep architecture revealed a normal sleep efficiency, normal primary sleep latency, short REM  latency, and normal slow wave latency.  l Mild obstructive sleep apnea syndrome (HALIE). AHI: 11.4/hr.  l Moderate snoring  Diagnosis:  l Obstructive Sleep Apnea (G47.33)  Recommendations:  l Therapeutic CPAP titration to determine optimal pressure required to alleviate sleep disordered  breathing.  l Sleep hygiene should be reviewed to assess factors that may improve sleep quality.  l Weight management and regular exercise should be initiated or continued.      See imported Sleep Study result in "Chart Review" under the   "Media tab".      (This Sleep Study was interpreted by a Board Certified Sleep   Specialist who conducted an epoch-by-epoch review of the entire   raw data recording.)     (The indication for this sleep study was reviewed and deemed   appropriate by AASM Practice Parameters or other reasons by a   Board Certified Sleep Specialist.)    Eladio Dougherty MD      "

## 2018-12-27 NOTE — PROGRESS NOTES
Impressions:  l Sleep architecture revealed a normal sleep efficiency, normal primary sleep latency, short REM  latency, and normal slow wave latency.  l Mild obstructive sleep apnea syndrome (HALIE). AHI: 11.4/hr.  l Moderate snoring  Diagnosis:  l Obstructive Sleep Apnea (G47.33)  Recommendations:  l Therapeutic CPAP titration to determine optimal pressure required to alleviate sleep disordered  breathing.  l Sleep hygiene should be reviewed to assess factors that may improve sleep quality.  l Weight management and regular exercise should be initiated or continued.

## 2019-01-07 ENCOUNTER — HOSPITAL ENCOUNTER (OUTPATIENT)
Dept: SLEEP MEDICINE | Facility: HOSPITAL | Age: 54
Discharge: HOME | End: 2019-01-07
Attending: INTERNAL MEDICINE
Payer: MEDICAID

## 2019-01-07 DIAGNOSIS — G47.33 OSA (OBSTRUCTIVE SLEEP APNEA): ICD-10-CM

## 2019-01-07 PROCEDURE — 95811 POLYSOM 6/>YRS CPAP 4/> PARM: CPT | Mod: 26,,, | Performed by: INTERNAL MEDICINE

## 2019-01-07 PROCEDURE — 95811 PR POLYSOMNOGRAPHY W/CPAP: ICD-10-PCS | Mod: 26,,, | Performed by: INTERNAL MEDICINE

## 2019-01-07 PROCEDURE — 95811 POLYSOM 6/>YRS CPAP 4/> PARM: CPT

## 2019-01-07 NOTE — Clinical Note
Diagnosis:l Obstructive Sleep Apnea (G47.33)l Mild Periodic limb movements causing arousal associated with sleep disorder breathingRecommendations:l Trial of CPAP therapy on 10 cm H2O with a Medium size Resmed Full Face Mask AirTouch maskand heated humidification.l Avoid alcohol, sedatives and other CNS depressants that may worsen sleep apnea and disruptnormal sleep architecture.l Sleep hygiene should be reviewed to assess factors that may improve sleep quality.l Weight management and regular exercise should be initiated or continued.l Return to Sleep Center for re-evaluation after 4 -6 weeks of therapy

## 2019-01-10 NOTE — TELEPHONE ENCOUNTER
----- Message from Giorgi Ma sent at 1/10/2019 10:12 AM CST -----  Contact: [facility] [place]  Requesting call back regarding questions on pt medication being switched to this company to fill but Ms. Easley wants to get approval that its okay for pt have due to Dr. Luna not filling rx. Please call back at [phone].      Thanks,  Giorgi Ma

## 2019-01-10 NOTE — PROCEDURES
"Diagnosis:  l Obstructive Sleep Apnea (G47.33)  l Mild Periodic limb movements causing arousal associated with sleep disorder breathing  Recommendations:  l Trial of CPAP therapy on 10 cm H2O with a Medium size Resmed Full Face Mask AirTouch mask  and heated humidification.  l Avoid alcohol, sedatives and other CNS depressants that may worsen sleep apnea and disrupt  normal sleep architecture.  l Sleep hygiene should be reviewed to assess factors that may improve sleep quality.  l Weight management and regular exercise should be initiated or continued.  l Return to Sleep Center for re-evaluation after 4 -6 weeks of therapy    See imported Sleep Study result in "Chart Review" under the   "Media tab".      (This Sleep Study was interpreted by a Board Certified Sleep   Specialist who conducted an epoch-by-epoch review of the entire   raw data recording.)     (The indication for this sleep study was reviewed and deemed   appropriate by AASM Practice Parameters or other reasons by a   Board Certified Sleep Specialist.)    Eladio Dougherty MD      "

## 2019-01-14 ENCOUNTER — TELEPHONE (OUTPATIENT)
Dept: PULMONOLOGY | Facility: CLINIC | Age: 54
End: 2019-01-14

## 2019-01-14 ENCOUNTER — OFFICE VISIT (OUTPATIENT)
Dept: PULMONOLOGY | Facility: CLINIC | Age: 54
End: 2019-01-14
Payer: MEDICAID

## 2019-01-14 VITALS
RESPIRATION RATE: 18 BRPM | HEIGHT: 63 IN | SYSTOLIC BLOOD PRESSURE: 126 MMHG | WEIGHT: 205.81 LBS | DIASTOLIC BLOOD PRESSURE: 80 MMHG | BODY MASS INDEX: 36.46 KG/M2 | OXYGEN SATURATION: 95 % | HEART RATE: 90 BPM

## 2019-01-14 DIAGNOSIS — G47.33 OSA (OBSTRUCTIVE SLEEP APNEA): Primary | ICD-10-CM

## 2019-01-14 DIAGNOSIS — I63.9 CEREBROVASCULAR ACCIDENT (CVA), UNSPECIFIED MECHANISM: ICD-10-CM

## 2019-01-14 DIAGNOSIS — I25.118 CORONARY ARTERY DISEASE OF NATIVE ARTERY OF NATIVE HEART WITH STABLE ANGINA PECTORIS: ICD-10-CM

## 2019-01-14 PROCEDURE — 99999 PR PBB SHADOW E&M-EST. PATIENT-LVL IV: ICD-10-PCS | Mod: PBBFAC,,, | Performed by: NURSE PRACTITIONER

## 2019-01-14 PROCEDURE — 99999 PR PBB SHADOW E&M-EST. PATIENT-LVL IV: CPT | Mod: PBBFAC,,, | Performed by: NURSE PRACTITIONER

## 2019-01-14 PROCEDURE — 99214 PR OFFICE/OUTPT VISIT, EST, LEVL IV, 30-39 MIN: ICD-10-PCS | Mod: S$PBB,,, | Performed by: NURSE PRACTITIONER

## 2019-01-14 PROCEDURE — 99214 OFFICE O/P EST MOD 30 MIN: CPT | Mod: PBBFAC | Performed by: NURSE PRACTITIONER

## 2019-01-14 PROCEDURE — 99214 OFFICE O/P EST MOD 30 MIN: CPT | Mod: S$PBB,,, | Performed by: NURSE PRACTITIONER

## 2019-01-14 NOTE — PROGRESS NOTES
"Subjective:      Patient ID: Tri Staton is a 53 y.o. female.    Chief Complaint: Sleep Apnea    HPI  Patient presents to the office today for evaluation of sleep apnea.  Patient with recent polysomnogram and CPAP titration.  Patient with snoring witnessed apneas.  History of CAG, CVA.  She does not feel refreshed when she wakes up in the morning.  Logan Sleepiness Scale score 12    Patient Active Problem List   Diagnosis    Coronary artery disease of native artery of native heart with stable angina pectoris    Pericardial effusion    CVA (cerebral vascular accident)    Essential hypertension    Controlled type 2 diabetes mellitus, without long-term current use of insulin    Mixed hyperlipidemia    Ex-cigar smoker    History of alcohol abuse    History of cocaine abuse    DVT (deep vein thrombosis) in pregnancy    VTE (venous thromboembolism)    Acute bronchitis    Mucoid impaction of bronchi    Discoid atelectasis    Suspected sleep apnea    Late effects of CVA (cerebrovascular accident)    SIRS (systemic inflammatory response syndrome)    KORI (acute kidney injury)    Hypercoagulable state    Trichomonal cystitis    HALIE (obstructive sleep apnea)       /80   Pulse 90   Resp 18   Ht 5' 3" (1.6 m)   Wt 93.3 kg (205 lb 12.8 oz)   LMP  (LMP Unknown)   SpO2 95%   BMI 36.46 kg/m²   Body mass index is 36.46 kg/m².    Review of Systems   Constitutional: Positive for fatigue.   Respiratory: Positive for dyspnea on extertion.    Musculoskeletal: Positive for gait problem.   Psychiatric/Behavioral: Positive for sleep disturbance.   All other systems reviewed and are negative.    Objective:      Physical Exam   Constitutional: She is oriented to person, place, and time. She appears well-developed and well-nourished.   obese   HENT:   Head: Normocephalic and atraumatic.   Neck: Normal range of motion. Neck supple.   Cardiovascular: Normal rate and regular rhythm.   Pulmonary/Chest: " Effort normal and breath sounds normal.   Abdominal: Soft.   Musculoskeletal:   Left sided weakness   Neurological: She is alert and oriented to person, place, and time.   Skin: Skin is warm and dry.   Psychiatric: She has a normal mood and affect.     Personal Diagnostic Review    Polysomnogram show sleep apnea with AHI 11/hr.     CPAP titrations reveals 10cm H2O pressure is adequate.     Assessment:       1. HALIE (obstructive sleep apnea)    2. Coronary artery disease of native artery of native heart with stable angina pectoris    3. Cerebrovascular accident (CVA), unspecified mechanism        Outpatient Encounter Medications as of 1/14/2019   Medication Sig Dispense Refill    amitriptyline (ELAVIL) 50 MG tablet Take 50 mg by mouth every evening.      apixaban (ELIQUIS) 5 mg Tab Take 1 tablet (5 mg total) by mouth 2 (two) times daily. 60 tablet 11    aspirin (ECOTRIN) 81 MG EC tablet Take 81 mg by mouth once daily.      atorvastatin (LIPITOR) 80 MG tablet Take 80 mg by mouth once daily.      docusate sodium (COLACE) 100 MG capsule Take 1 capsule (100 mg total) by mouth 2 (two) times daily as needed for Constipation. 40 capsule 0    gabapentin (NEURONTIN) 600 MG tablet Take 600 mg by mouth 3 (three) times daily.      hydrocortisone 2.5 % cream Apply topically 2 (two) times daily. for 7 days 1 Tube 0    isosorbide mononitrate (IMDUR) 60 MG 24 hr tablet Take 1 tablet (60 mg total) by mouth 2 (two) times daily. 60 tablet 5    lisinopril (PRINIVIL,ZESTRIL) 40 MG tablet Take 1 tablet (40 mg total) by mouth once daily. 30 tablet 11    metoprolol succinate (TOPROL-XL) 25 MG 24 hr tablet Take 1 tablet (25 mg total) by mouth once daily. (Patient taking differently: Take 25 mg by mouth once daily. ) 30 tablet 5    NIFEdipine (ADALAT CC) 30 MG TbSR Take 1 tablet (30 mg total) by mouth once daily. 30 tablet 3    nitroGLYCERIN (NITROSTAT) 0.4 MG SL tablet Place 1 tablet (0.4 mg total) under the tongue every 5 (five)  minutes as needed for Chest pain. 25 tablet 3    oxybutynin (DITROPAN) 5 MG Tab Take 5 mg by mouth 2 (two) times daily.       No facility-administered encounter medications on file as of 1/14/2019.      Orders Placed This Encounter   Procedures    CPAP FOR HOME USE     Order Specific Question:   Type:     Answer:   Auto CPAP     Order Specific Question:   Auto CPAP pressure setting range (cmH20):     Answer:   6-16     Order Specific Question:   Length of need (1-99 months):     Answer:   99     Order Specific Question:   Humidification:     Answer:   Heated     Order Specific Question:   Type of mask:     Answer:   Nasal     Order Specific Question:   Headgear?     Answer:   Yes     Order Specific Question:   Tubing?     Answer:   Yes     Order Specific Question:   Humidifier chamber?     Answer:   Yes     Order Specific Question:   Chin strap?     Answer:   Yes     Order Specific Question:   Filters?     Answer:   Yes    CPAP/BIPAP SUPPLIES     Order Specific Question:   Type of mask:     Answer:   Nasal     Order Specific Question:   Headgear?     Answer:   Yes     Order Specific Question:   Tubing?     Answer:   Yes     Order Specific Question:   Humidifier chamber?     Answer:   Yes     Order Specific Question:   Chin strap?     Answer:   Yes     Order Specific Question:   Filters?     Answer:   Yes     Order Specific Question:   Length of need (1-99 months):     Answer:   99    CPAP/BIPAP SUPPLIES     Order Specific Question:   Type of mask:     Answer:   FFM     Order Specific Question:   Headgear?     Answer:   Yes     Order Specific Question:   Tubing?     Answer:   Yes     Order Specific Question:   Humidifier chamber?     Answer:   Yes     Order Specific Question:   Chin strap?     Answer:   Yes     Order Specific Question:   Filters?     Answer:   Yes     Order Specific Question:   Length of need (1-99 months):     Answer:   99     Plan:      CPAP 10 cm H2O  follow up in 8 weeks with download of data  card and review of symptoms.

## 2019-01-14 NOTE — PATIENT INSTRUCTIONS
What Are Snoring and Obstructive Sleep Apnea?  If youve ever had a stuffed-up nose, you know the feeling of trying to breathe through a very narrow passageway. This is what happens in your throat when you snore. While you sleep, structures in your throat partially block your air passage, making the passage narrow and hard to breathe through. If the entire passage becomes blocked and you cant breathe at all, you have sleep apnea.      Snoring Obstructive sleep apnea   Snoring  If your throat structures are too large or the muscles relax too much during sleep, the air passage may be partially blocked. As air from the nose or mouth passes around this blockage, the throat structures vibrate, causing the familiar sound of snoring. At times, this sound can be so loud that snorers wake up others, or even themselves, during the night. Snoring gets worse as more and more of the air passage is blocked.  Obstructive sleep apnea  If the structures completely block the throat, air cant flow to the lungs at all. This is called apnea (meaning no breathing). Since the lungs arent getting fresh air, the brain tells the body to wake up just enough to tighten the muscles and unblock the air passage. With a loud gasp, breathing begins again. This process may be repeated over and over again throughout the night, making your sleep fragmented with a lighter stage of sleep. Even though you do not remember waking up many times during the night to a lighter sleep, you feel tired the next day. The lack of sleep and fresh air can also strain your lungs, heart, and other organs, leading to problems such as high blood pressure, heart attack, or stroke.  Problems in the nose and jaw  Problems in the structure of the nose may obstruct breathing. A crooked (deviated) septum or swollen turbinates can make snoring worse or lead to apnea. Also, a receding jaw may make the tongue sit too far back, so its more likely to block the airway when  youre asleep.        Date Last Reviewed: 7/18/2015  © 4712-1690 [employer]. [address] [place] [address]. All rights reserved. This information is not intended as a substitute for professional medical care. Always follow your healthcare professional's instructions.        Continuous Positive Air Pressure (CPAP)     A mask over the nose gently directs air into the throat to keep the airway open.   Continuous positive air pressure (CPAP) uses gentle air pressure to hold the airway open. CPAP is often the most effective treatment for sleep apnea and severe snoring. It works very well for many people. But keep in mind that it can take several adjustments before the setup is right for you.  How CPAP works  The CPAP machine  is a small portable pump beside the bed. The pump sends air through a hose, which is held over your nose and mouth by a mask. Mild air pressure is gently pushed through your airway. The air pressure nudges sagging tissues aside. This widens the airway so you can breathe better. CPAP may be combined with other kinds of therapy for sleep apnea.  Types of air pressure treatments  There are different types of CPAP. Your doctor or CPAP technician will help you decide which type is best for you:  · Basic CPAP keeps the pressure constant all night long.  · A bilevel device (BiPAP) provides more pressure when you breathe in and less when you breathe out. A BiPAP machine also may be set to provide automatic breaths to maintain breathing if you stop breathing while sleeping.  · An autoCPAP device automatically adjusts pressure throughout the night and in response to changes such as body position, sleep stage, and snoring.  Date Last Reviewed: 8/10/2015  © 6869-1900 [employer]. [address] [place], [place] [address]. All rights reserved. This information is not intended as a substitute for professional medical care. Always follow your healthcare professional's  instructions.

## 2019-01-14 NOTE — TELEPHONE ENCOUNTER
Called and rescheduled patients appt for 1/14. They confirmed time and date ----- Message from Sofiya Rosas sent at 1/14/2019 10:12 AM CST -----  Contact: Stephie/mother  States she needs to reschedule her appt, nothing coming up on the schedule. Please call Stephie at [phone]. Thank you

## 2019-01-24 ENCOUNTER — OFFICE VISIT (OUTPATIENT)
Dept: CARDIOLOGY | Facility: CLINIC | Age: 54
End: 2019-01-24
Payer: MEDICAID

## 2019-01-24 VITALS — SYSTOLIC BLOOD PRESSURE: 94 MMHG | HEART RATE: 96 BPM | DIASTOLIC BLOOD PRESSURE: 70 MMHG

## 2019-01-24 DIAGNOSIS — I25.118 CORONARY ARTERY DISEASE OF NATIVE ARTERY OF NATIVE HEART WITH STABLE ANGINA PECTORIS: Primary | ICD-10-CM

## 2019-01-24 DIAGNOSIS — I63.9 CEREBROVASCULAR ACCIDENT (CVA), UNSPECIFIED MECHANISM: ICD-10-CM

## 2019-01-24 DIAGNOSIS — I31.39 PERICARDIAL EFFUSION: ICD-10-CM

## 2019-01-24 DIAGNOSIS — I10 ESSENTIAL HYPERTENSION: ICD-10-CM

## 2019-01-24 DIAGNOSIS — F19.10 DRUG ABUSE: ICD-10-CM

## 2019-01-24 DIAGNOSIS — E78.2 MIXED HYPERLIPIDEMIA: ICD-10-CM

## 2019-01-24 PROCEDURE — 99999 PR PBB SHADOW E&M-EST. PATIENT-LVL III: ICD-10-PCS | Mod: PBBFAC,,, | Performed by: INTERNAL MEDICINE

## 2019-01-24 PROCEDURE — 99999 PR PBB SHADOW E&M-EST. PATIENT-LVL III: CPT | Mod: PBBFAC,,, | Performed by: INTERNAL MEDICINE

## 2019-01-24 PROCEDURE — 99215 OFFICE O/P EST HI 40 MIN: CPT | Mod: S$PBB,,, | Performed by: INTERNAL MEDICINE

## 2019-01-24 PROCEDURE — 99215 PR OFFICE/OUTPT VISIT, EST, LEVL V, 40-54 MIN: ICD-10-PCS | Mod: S$PBB,,, | Performed by: INTERNAL MEDICINE

## 2019-01-24 PROCEDURE — 99213 OFFICE O/P EST LOW 20 MIN: CPT | Mod: PBBFAC,PN | Performed by: INTERNAL MEDICINE

## 2019-01-24 RX ORDER — LISINOPRIL 10 MG/1
10 TABLET ORAL DAILY
Qty: 30 TABLET | Refills: 5 | Status: SHIPPED | OUTPATIENT
Start: 2019-01-24 | End: 2019-04-12 | Stop reason: SDUPTHER

## 2019-01-24 NOTE — PROGRESS NOTES
Subjective:   Patient ID:  Tri Staton is a 53 y.o. female who presents for follow up of Follow-up and Coronary Artery Disease      52 yo female, f/u for severe multi-vessel CAD. Wheelchair bound after CVA.  PMH recent Dx of CAD and CVA with residual left side weakness in , DVT, HTN, MI, hypercholesteremia, and neuropathy.  , had chest pain, dyspnea and could not move. Went to [facility]. Dx of CVA. D/c'ed. Few days later, felt worse and fever. Went to hospital again. Had cath done and CABG was advised. Pt decided to go back to  for medical care.   Evaluated by Dr. Steele CVT at [facility] in  and due to diffuse coronary disease and high risk, she is not candidate for CABG.  Went to ER [facility] on  for LLE swelling and pain.  US revealed positive DVT and started eliquis. Chest CTA moderate pericardial effusion and no PE. D/c'ed from ER.  10/04, c/o chest pain and weakness. With hemarroids. Sent to ER and troponin < 0.006 and EKG did not show acute stt change. BNP normal  : ECHo showed normal EF and moderate pericardial effusion. No tamponade.   Quit smoking 3 months, 1 cigar a day.x 10 yrs  Quit drinking 6 months, 1 pint of liquor a day x10 yrs.  Smoked marijuana  Smoked cocaines one yr ago for 10 yrs, remote IV cocaine 25 yrs ago.    Feels fatigue and weakness. Feels dizziness.   VARGAS with 20 steps. No orthopnea.   PT pending.,   No chest pain,             Past Medical History:   Diagnosis Date    Coronary artery disease     Coronary artery disease of native artery of native heart with stable angina pectoris 2018    Hypercholesteremia     Hypertension     Neuropathy     Stroke        Past Surgical History:   Procedure Laterality Date     SECTION         Social History     Tobacco Use    Smoking status: Former Smoker    Smokeless tobacco: Never Used   Substance Use Topics    Alcohol use: No     Frequency: Never    Drug use:  No       Family History   Problem Relation Age of Onset    Hypertension Mother          Review of Systems   Constitution: Negative for decreased appetite, diaphoresis, fever, weakness, malaise/fatigue and night sweats.   HENT: Negative for nosebleeds.    Eyes: Negative for blurred vision and double vision.   Cardiovascular: Positive for dyspnea on exertion and leg swelling. Negative for chest pain, claudication, irregular heartbeat, near-syncope, orthopnea, palpitations, paroxysmal nocturnal dyspnea and syncope.   Respiratory: Negative for cough, shortness of breath, sleep disturbances due to breathing, snoring, sputum production and wheezing.    Endocrine: Negative for cold intolerance and polyuria.   Hematologic/Lymphatic: Does not bruise/bleed easily.   Skin: Negative for rash.   Musculoskeletal: Positive for muscle weakness. Negative for back pain, falls, joint pain, joint swelling and neck pain.   Gastrointestinal: Negative for abdominal pain, heartburn, nausea and vomiting.   Genitourinary: Negative for dysuria, frequency and hematuria.   Neurological: Positive for loss of balance. Negative for difficulty with concentration, dizziness, focal weakness, headaches, light-headedness, numbness and seizures.   Psychiatric/Behavioral: Negative for depression, memory loss and substance abuse. The patient does not have insomnia.    Allergic/Immunologic: Negative for HIV exposure and hives.       Objective:   Physical Exam   Constitutional: She is oriented to person, place, and time. She appears well-nourished.   HENT:   Head: Normocephalic.   Eyes: Pupils are equal, round, and reactive to light.   Neck: Normal carotid pulses and no JVD present. Carotid bruit is not present. No thyromegaly present.   Cardiovascular: Normal rate, regular rhythm, normal heart sounds and normal pulses.  No extrasystoles are present. PMI is not displaced. Exam reveals no gallop and no S3.   No murmur heard.  Pulmonary/Chest: Breath sounds  normal. No stridor. No respiratory distress.   Abdominal: Soft. Bowel sounds are normal. There is no tenderness. There is no rebound.   Musculoskeletal: Normal range of motion. She exhibits no edema.   Trace edema   Neurological: She is alert and oriented to person, place, and time.   Skin: Skin is intact. No rash noted.   Psychiatric: Her behavior is normal.       Lab Results   Component Value Date    CHOL 80 (L) 11/24/2018     Lab Results   Component Value Date    HDL 22 (L) 11/24/2018     Lab Results   Component Value Date    LDLCALC 42.8 (L) 11/24/2018     Lab Results   Component Value Date    TRIG 76 11/24/2018     Lab Results   Component Value Date    CHOLHDL 27.5 11/24/2018       Chemistry        Component Value Date/Time     12/12/2018 2200    K 4.5 12/12/2018 2200     (H) 12/12/2018 2200    CO2 22 (L) 12/12/2018 2200    BUN 11 12/12/2018 2200    CREATININE 1.0 12/12/2018 2200     12/12/2018 2200        Component Value Date/Time    CALCIUM 9.1 12/12/2018 2200    ALKPHOS 73 12/12/2018 2200    AST 78 (H) 12/12/2018 2200    ALT 51 (H) 12/12/2018 2200    BILITOT 0.4 12/12/2018 2200    ESTGFRAFRICA >60 12/12/2018 2200    EGFRNONAA >60 12/12/2018 2200          Lab Results   Component Value Date    HGBA1C 5.0 11/24/2018     Lab Results   Component Value Date    TSH 0.605 11/24/2018     Lab Results   Component Value Date    INR 1.2 11/03/2018    INR 1.2 10/04/2018    INR 1.2 09/27/2018     Lab Results   Component Value Date    WBC 3.68 (L) 12/12/2018    HGB 12.1 12/12/2018    HCT 37.6 12/12/2018    MCV 92 12/12/2018     12/12/2018     BMP  Sodium   Date Value Ref Range Status   12/12/2018 141 136 - 145 mmol/L Final     Potassium   Date Value Ref Range Status   12/12/2018 4.5 3.5 - 5.1 mmol/L Final     Chloride   Date Value Ref Range Status   12/12/2018 111 (H) 95 - 110 mmol/L Final     CO2   Date Value Ref Range Status   12/12/2018 22 (L) 23 - 29 mmol/L Final     BUN, Bld   Date Value Ref  Range Status   12/12/2018 11 6 - 20 mg/dL Final     Creatinine   Date Value Ref Range Status   12/12/2018 1.0 0.5 - 1.4 mg/dL Final     Calcium   Date Value Ref Range Status   12/12/2018 9.1 8.7 - 10.5 mg/dL Final     Anion Gap   Date Value Ref Range Status   12/12/2018 8 8 - 16 mmol/L Final     eGFR if    Date Value Ref Range Status   12/12/2018 >60 >60 mL/min/1.73 m^2 Final     eGFR if non    Date Value Ref Range Status   12/12/2018 >60 >60 mL/min/1.73 m^2 Final     Comment:     Calculation used to obtain the estimated glomerular filtration  rate (eGFR) is the CKD-EPI equation.        BNP  @LABRCNTIP(BNP,BNPTRIAGEBLO)@  @LABRCNTIP(troponini)@  CrCl cannot be calculated (Patient's most recent lab result is older than the maximum 7 days allowed.).  No results found in the last 24 hours.  No results found in the last 24 hours.  No results found in the last 24 hours.    Assessment:      1. Coronary artery disease of native artery of native heart with stable angina pectoris    2. Pericardial effusion    3. Mixed hyperlipidemia    4. Essential hypertension    5. Cerebrovascular accident (CVA), unspecified mechanism    6. Drug abuse      Low BP and VARGAS. BNP wnl  No CP and EF normal  Fatigue after CVA  Plan:   Decrease Lisinopril fro 40 mg to 10 mg   Repeat echo to r/o large pericardial effusion and tamponade  Obtain the cath copy from Dr. Mondragon's office, and pt and her mother wants 2nd opnion of cabg.  Continue ASA, Eliquis for DVT, Lipitor.  Ok to hold Nifedipine and Imdur  Check BP at home  RTC after echo

## 2019-01-25 PROBLEM — F19.10 DRUG ABUSE: Status: ACTIVE | Noted: 2019-01-25

## 2019-01-29 ENCOUNTER — CLINICAL SUPPORT (OUTPATIENT)
Dept: CARDIOLOGY | Facility: CLINIC | Age: 54
End: 2019-01-29
Attending: INTERNAL MEDICINE
Payer: MEDICAID

## 2019-01-29 DIAGNOSIS — I31.39 PERICARDIAL EFFUSION: ICD-10-CM

## 2019-01-29 DIAGNOSIS — I25.118 CORONARY ARTERY DISEASE OF NATIVE ARTERY OF NATIVE HEART WITH STABLE ANGINA PECTORIS: ICD-10-CM

## 2019-01-29 PROCEDURE — 93306 TTE W/DOPPLER COMPLETE: CPT | Mod: PBBFAC,PN | Performed by: INTERNAL MEDICINE

## 2019-01-29 PROCEDURE — 93306 2D ECHO WITH COLOR FLOW DOPPLER: ICD-10-PCS | Mod: 26,S$PBB,, | Performed by: INTERNAL MEDICINE

## 2019-02-04 ENCOUNTER — TELEPHONE (OUTPATIENT)
Dept: CARDIOLOGY | Facility: CLINIC | Age: 54
End: 2019-02-04

## 2019-02-04 LAB
DIASTOLIC DYSFUNCTION: YES
ESTIMATED PA SYSTOLIC PRESSURE: 12.11
RETIRED EF AND QEF - SEE NOTES: 60 (ref 55–65)

## 2019-02-04 NOTE — TELEPHONE ENCOUNTER
Sent message to staff to see if Dr Winston still has disk.  Waiting for reply.     ----- Message from Christopher Weldon MD sent at 2/4/2019 11:07 AM CST -----  Did you contact Dr. Winston's office for the disc of cath?

## 2019-02-05 NOTE — TELEPHONE ENCOUNTER
----- Message from Lucy Dill sent at 2/5/2019  2:17 PM CST -----  Contact: mother(Luma)[phone]  Would like to consult with nurse regarding medication refill, please call back at [phone]. Thanks/ar

## 2019-02-05 NOTE — TELEPHONE ENCOUNTER
Spoke with pt who wanted to send Rx to [facility].  [facility] was called and new Rx was sent to [facility].

## 2019-02-05 NOTE — TELEPHONE ENCOUNTER
----- Message from Lucy Dill sent at 2/5/2019  2:17 PM CST -----  Contact: mother(Luma)[phone]  Would like to consult with nurse regarding medication refill, please call back at [phone]. Thanks/ar

## 2019-02-13 ENCOUNTER — TELEPHONE (OUTPATIENT)
Dept: CARDIOLOGY | Facility: CLINIC | Age: 54
End: 2019-02-13

## 2019-02-13 ENCOUNTER — TELEPHONE (OUTPATIENT)
Dept: PULMONOLOGY | Facility: CLINIC | Age: 54
End: 2019-02-13

## 2019-02-13 NOTE — TELEPHONE ENCOUNTER
Returned patient call reschedule 02/14/19 appointment, appointment reschedule for 02/21/19, patient agreed with date and time.

## 2019-02-13 NOTE — TELEPHONE ENCOUNTER
Need to track down the disk for her and get the  to bring the disk    ------------------------------------    Dr Charlie Pollard (cardiologist) would like to get a copy of pt's information--pt's mother will call back with their phone number or try to get in touch with that office to get an information release form sent to us.    Also, Dr HARVEY prescribed pressure medication (lisinopril 10 mg)--should she only give this if needed or all of the time?      ----- Message from Leno Carranza sent at 2/13/2019  1:03 PM CST -----  Contact: Mother Gregor Card-[phone]  .Type:  Needs Medical Advice    Who Called: Rachelle  Symptoms (please be specific): n/a   How long has patient had these symptoms:  n/a  Pharmacy name and phone #:  n/a  Would the patient rather a call back or a response via [facility]? Call back  Best Call Back Number: [phone]  Additional Information: Pt's mother would like call back from nurse, did not state reason for call

## 2019-02-18 ENCOUNTER — TELEPHONE (OUTPATIENT)
Dept: CARDIOLOGY | Facility: CLINIC | Age: 54
End: 2019-02-18

## 2019-02-18 NOTE — TELEPHONE ENCOUNTER
Attempted without success to contact pts mother at [phone] to let her know we have the disk.  Left VM for mother to return to tell me if she wants to pick it up today at [facility] or tomorrow at [facility].

## 2019-02-25 DIAGNOSIS — I25.118 CORONARY ARTERY DISEASE OF NATIVE ARTERY OF NATIVE HEART WITH STABLE ANGINA PECTORIS: ICD-10-CM

## 2019-02-25 RX ORDER — ISOSORBIDE MONONITRATE 60 MG/1
60 TABLET, EXTENDED RELEASE ORAL 2 TIMES DAILY
Qty: 60 TABLET | Refills: 5 | Status: SHIPPED | OUTPATIENT
Start: 2019-02-25 | End: 2020-03-30

## 2019-02-25 NOTE — TELEPHONE ENCOUNTER
----- Message from Tessa Montemayor sent at 2/25/2019  2:42 PM CST -----  Contact: Patients sitter,  1. What is the name of the medication you are requesting? Isosorbride Mononiter  2. What is the dose? 60mg  3. How do you take the medication? Orally, topically, etc? orally  4. How often do you take this medication? Twice daily  5. Do you need a 30 day or 90 day supply? 30  6. How many refills are you requesting?   7. What is your preferred pharmacy and [facility]  8. Who can we contact with further questions? Patient

## 2019-03-18 ENCOUNTER — TELEPHONE (OUTPATIENT)
Dept: CARDIOLOGY | Facility: CLINIC | Age: 54
End: 2019-03-18

## 2019-03-18 NOTE — TELEPHONE ENCOUNTER
----- Message from Julia Milian sent at 3/18/2019 11:17 AM CDT -----  Contact: Kimberley  Caller request call back from nurse regarding pts prescription (fluid pill ) has not been sent to [facility] [facility] [phone] (home)

## 2019-03-18 NOTE — TELEPHONE ENCOUNTER
Pt states primary care doctor wanted Dr. Weldon to prescribe fluid pill for swelling.pt will contact primary care doctor to discuss prescription.

## 2019-03-20 DIAGNOSIS — I25.10 CORONARY ARTERY DISEASE, ANGINA PRESENCE UNSPECIFIED, UNSPECIFIED VESSEL OR LESION TYPE, UNSPECIFIED WHETHER NATIVE OR TRANSPLANTED HEART: Primary | ICD-10-CM

## 2019-03-21 ENCOUNTER — OFFICE VISIT (OUTPATIENT)
Dept: CARDIOLOGY | Facility: CLINIC | Age: 54
End: 2019-03-21
Payer: MEDICAID

## 2019-03-21 ENCOUNTER — CLINICAL SUPPORT (OUTPATIENT)
Dept: CARDIOLOGY | Facility: CLINIC | Age: 54
End: 2019-03-21
Payer: MEDICAID

## 2019-03-21 ENCOUNTER — TELEPHONE (OUTPATIENT)
Dept: CARDIOLOGY | Facility: CLINIC | Age: 54
End: 2019-03-21

## 2019-03-21 VITALS
BODY MASS INDEX: 36.46 KG/M2 | HEART RATE: 91 BPM | SYSTOLIC BLOOD PRESSURE: 104 MMHG | HEIGHT: 63 IN | DIASTOLIC BLOOD PRESSURE: 66 MMHG

## 2019-03-21 DIAGNOSIS — O22.30 DVT (DEEP VEIN THROMBOSIS) IN PREGNANCY: ICD-10-CM

## 2019-03-21 DIAGNOSIS — I25.10 CORONARY ARTERY DISEASE, ANGINA PRESENCE UNSPECIFIED, UNSPECIFIED VESSEL OR LESION TYPE, UNSPECIFIED WHETHER NATIVE OR TRANSPLANTED HEART: ICD-10-CM

## 2019-03-21 DIAGNOSIS — I10 ESSENTIAL HYPERTENSION: ICD-10-CM

## 2019-03-21 DIAGNOSIS — F19.10 DRUG ABUSE: ICD-10-CM

## 2019-03-21 DIAGNOSIS — I31.39 PERICARDIAL EFFUSION: ICD-10-CM

## 2019-03-21 DIAGNOSIS — I25.118 CORONARY ARTERY DISEASE OF NATIVE ARTERY OF NATIVE HEART WITH STABLE ANGINA PECTORIS: Primary | ICD-10-CM

## 2019-03-21 DIAGNOSIS — I69.90 LATE EFFECTS OF CVA (CEREBROVASCULAR ACCIDENT): ICD-10-CM

## 2019-03-21 DIAGNOSIS — E78.2 MIXED HYPERLIPIDEMIA: ICD-10-CM

## 2019-03-21 PROCEDURE — 99999 PR PBB SHADOW E&M-EST. PATIENT-LVL III: ICD-10-PCS | Mod: PBBFAC,,, | Performed by: INTERNAL MEDICINE

## 2019-03-21 PROCEDURE — 99214 OFFICE O/P EST MOD 30 MIN: CPT | Mod: S$PBB,,, | Performed by: INTERNAL MEDICINE

## 2019-03-21 PROCEDURE — 99214 PR OFFICE/OUTPT VISIT, EST, LEVL IV, 30-39 MIN: ICD-10-PCS | Mod: S$PBB,,, | Performed by: INTERNAL MEDICINE

## 2019-03-21 PROCEDURE — 93010 EKG 12-LEAD: ICD-10-PCS | Mod: S$PBB,,, | Performed by: INTERNAL MEDICINE

## 2019-03-21 PROCEDURE — 99213 OFFICE O/P EST LOW 20 MIN: CPT | Mod: PBBFAC,PN,25 | Performed by: INTERNAL MEDICINE

## 2019-03-21 PROCEDURE — 93010 ELECTROCARDIOGRAM REPORT: CPT | Mod: S$PBB,,, | Performed by: INTERNAL MEDICINE

## 2019-03-21 PROCEDURE — 93005 ELECTROCARDIOGRAM TRACING: CPT | Mod: PBBFAC,PN | Performed by: INTERNAL MEDICINE

## 2019-03-21 PROCEDURE — 99999 PR PBB SHADOW E&M-EST. PATIENT-LVL III: CPT | Mod: PBBFAC,,, | Performed by: INTERNAL MEDICINE

## 2019-03-21 RX ORDER — FUROSEMIDE 20 MG/1
20 TABLET ORAL
Qty: 16 TABLET | Refills: 3 | Status: SHIPPED | OUTPATIENT
Start: 2019-03-22 | End: 2019-06-25 | Stop reason: SDUPTHER

## 2019-03-21 RX ORDER — FAMOTIDINE 20 MG/1
20 TABLET, FILM COATED ORAL 2 TIMES DAILY PRN
Refills: 2 | COMMUNITY
Start: 2019-03-06

## 2019-03-21 RX ORDER — MULTIVITAMIN
1 TABLET ORAL DAILY
Refills: 3 | COMMUNITY
Start: 2019-01-31

## 2019-03-21 RX ORDER — GLECAPREVIR AND PIBRENTASVIR 40; 100 MG/1; MG/1
TABLET, FILM COATED ORAL
Refills: 1 | COMMUNITY
Start: 2019-03-08

## 2019-03-21 RX ORDER — CETIRIZINE HYDROCHLORIDE 10 MG/1
10 TABLET ORAL DAILY PRN
Refills: 2 | COMMUNITY
Start: 2019-02-21

## 2019-03-21 RX ORDER — METOPROLOL SUCCINATE 25 MG/1
25 TABLET, EXTENDED RELEASE ORAL 2 TIMES DAILY
Qty: 60 TABLET | Refills: 5 | Status: SHIPPED | OUTPATIENT
Start: 2019-03-21 | End: 2020-04-08

## 2019-03-21 RX ORDER — METOPROLOL SUCCINATE 25 MG/1
25 TABLET, EXTENDED RELEASE ORAL 2 TIMES DAILY
Qty: 30 TABLET | Refills: 5 | Status: SHIPPED | OUTPATIENT
Start: 2019-03-21 | End: 2019-03-21 | Stop reason: SDUPTHER

## 2019-03-21 NOTE — TELEPHONE ENCOUNTER
Received call from Pharmacist needing clarification in amount of Metoprolol XL to be dispensed 60 tablets versus 30 tablets.

## 2019-03-21 NOTE — PROGRESS NOTES
Subjective:   Patient ID:  Tri Staton is a 53 y.o. female who presents for follow up of Coronary Artery Disease; Hypertension; and Hyperlipidemia      54 yo female, f/u for severe multi-vessel CAD. Wheelchair bound after CVA.  PMH recent Dx of CAD and CVA with residual left side weakness in , DVT, HTN, MI, hypercholesteremia, and neuropathy. H/o drug abuse.  Severe multivessel Dz. Not candidate for CABG or PCI. Continue on medical Rx  , had chest pain, dyspnea and could not move. Went to [facility]. Dx of CVA. D/c'ed. Few days later, felt worse and fever. Went to hospital again. Had cath done and CABG was advised. Pt decided to go back to  for medical care.   Evaluated by Dr. Steele CVT at [facility] in  and due to diffuse coronary disease and high risk, she is not candidate for CABG. Subsequently she had cardiac evaluation at [facility] cardiology and thought was not candidate for either PCI or CABG. Also went through cath imaging with Dr. Torres and no PCI indicated.   Went to ER [facility] on 09/16 for LLE swelling and pain.  US revealed positive DVT and started eliquis. Chest CTA moderate pericardial effusion and no PE. D/c'ed from ER.  10/04, c/o chest pain and weakness. With hemarroids. Sent to ER and troponin < 0.006 and EKG did not show acute stt change. BNP normal  : ECHo showed normal EF and moderate pericardial effusion. No tamponade.   BNP 17 in   Quit smoking 3 months, 1 cigar a day.x 10 yrs  Quit drinking 6 months, 1 pint of liquor a day x10 yrs.  Smoked marijuana  Smoked cocaines one yr ago for 10 yrs, remote IV cocaine 25 yrs ago.    Feels fatigue and weakness. Feels dizziness.   Limited walking due to back pain. No orthopnea.   No chest pain,           Past Medical History:   Diagnosis Date    Coronary artery disease     Coronary artery disease of native artery of native heart with stable angina pectoris 9/20/2018     Hypercholesteremia     Hypertension     Neuropathy     Stroke        Past Surgical History:   Procedure Laterality Date     SECTION         Social History     Tobacco Use    Smoking status: Former Smoker    Smokeless tobacco: Never Used   Substance Use Topics    Alcohol use: No     Frequency: Never    Drug use: No       Family History   Problem Relation Age of Onset    Hypertension Mother          Review of Systems   Constitution: Negative for decreased appetite, diaphoresis, fever, weakness, malaise/fatigue and night sweats.   HENT: Negative for nosebleeds.    Eyes: Negative for blurred vision and double vision.   Cardiovascular: Positive for dyspnea on exertion and leg swelling. Negative for chest pain, claudication, irregular heartbeat, near-syncope, orthopnea, palpitations, paroxysmal nocturnal dyspnea and syncope.   Respiratory: Negative for cough, shortness of breath, sleep disturbances due to breathing, snoring, sputum production and wheezing.    Endocrine: Negative for cold intolerance and polyuria.   Hematologic/Lymphatic: Does not bruise/bleed easily.   Skin: Negative for rash.   Musculoskeletal: Positive for muscle weakness. Negative for back pain, falls, joint pain, joint swelling and neck pain.   Gastrointestinal: Negative for abdominal pain, heartburn, nausea and vomiting.   Genitourinary: Negative for dysuria, frequency and hematuria.   Neurological: Positive for loss of balance. Negative for difficulty with concentration, dizziness, focal weakness, headaches, light-headedness, numbness and seizures.   Psychiatric/Behavioral: Negative for depression, memory loss and substance abuse. The patient does not have insomnia.    Allergic/Immunologic: Negative for HIV exposure and hives.       Objective:   Physical Exam   Constitutional: She is oriented to person, place, and time. She appears well-nourished.   HENT:   Head: Normocephalic.   Eyes: Pupils are equal, round, and reactive to light.    Neck: Normal carotid pulses and no JVD present. Carotid bruit is not present. No thyromegaly present.   Cardiovascular: Normal rate, regular rhythm, normal heart sounds and normal pulses.  No extrasystoles are present. PMI is not displaced. Exam reveals no gallop and no S3.   No murmur heard.  Pulmonary/Chest: Breath sounds normal. No stridor. No respiratory distress.   Abdominal: Soft. Bowel sounds are normal. There is no tenderness. There is no rebound.   Musculoskeletal: Normal range of motion. She exhibits edema.   Trace edema on LLE   Neurological: She is alert and oriented to person, place, and time.   Skin: Skin is intact. No rash noted.   Psychiatric: Her behavior is normal.       Lab Results   Component Value Date    CHOL 80 (L) 11/24/2018     Lab Results   Component Value Date    HDL 22 (L) 11/24/2018     Lab Results   Component Value Date    LDLCALC 42.8 (L) 11/24/2018     Lab Results   Component Value Date    TRIG 76 11/24/2018     Lab Results   Component Value Date    CHOLHDL 27.5 11/24/2018       Chemistry        Component Value Date/Time     12/12/2018 2200    K 4.5 12/12/2018 2200     (H) 12/12/2018 2200    CO2 22 (L) 12/12/2018 2200    BUN 11 12/12/2018 2200    CREATININE 1.0 12/12/2018 2200     12/12/2018 2200        Component Value Date/Time    CALCIUM 9.1 12/12/2018 2200    ALKPHOS 73 12/12/2018 2200    AST 78 (H) 12/12/2018 2200    ALT 51 (H) 12/12/2018 2200    BILITOT 0.4 12/12/2018 2200    ESTGFRAFRICA >60 12/12/2018 2200    EGFRNONAA >60 12/12/2018 2200          Lab Results   Component Value Date    HGBA1C 5.0 11/24/2018     Lab Results   Component Value Date    TSH 0.605 11/24/2018     Lab Results   Component Value Date    INR 1.2 11/03/2018    INR 1.2 10/04/2018    INR 1.2 09/27/2018     Lab Results   Component Value Date    WBC 3.68 (L) 12/12/2018    HGB 12.1 12/12/2018    HCT 37.6 12/12/2018    MCV 92 12/12/2018     12/12/2018     BMP  Sodium   Date Value Ref  Range Status   12/12/2018 141 136 - 145 mmol/L Final     Potassium   Date Value Ref Range Status   12/12/2018 4.5 3.5 - 5.1 mmol/L Final     Chloride   Date Value Ref Range Status   12/12/2018 111 (H) 95 - 110 mmol/L Final     CO2   Date Value Ref Range Status   12/12/2018 22 (L) 23 - 29 mmol/L Final     BUN, Bld   Date Value Ref Range Status   12/12/2018 11 6 - 20 mg/dL Final     Creatinine   Date Value Ref Range Status   12/12/2018 1.0 0.5 - 1.4 mg/dL Final     Calcium   Date Value Ref Range Status   12/12/2018 9.1 8.7 - 10.5 mg/dL Final     Anion Gap   Date Value Ref Range Status   12/12/2018 8 8 - 16 mmol/L Final     eGFR if    Date Value Ref Range Status   12/12/2018 >60 >60 mL/min/1.73 m^2 Final     eGFR if non    Date Value Ref Range Status   12/12/2018 >60 >60 mL/min/1.73 m^2 Final     Comment:     Calculation used to obtain the estimated glomerular filtration  rate (eGFR) is the CKD-EPI equation.        BNP  @LABRCNTIP(BNP,BNPTRIAGEBLO)@  @LABRCNTIP(troponini)@  CrCl cannot be calculated (Patient's most recent lab result is older than the maximum 7 days allowed.).  No results found in the last 24 hours.  No results found in the last 24 hours.  No results found in the last 24 hours.    Assessment:      1. Coronary artery disease of native artery of native heart with stable angina pectoris    2. Essential hypertension    3. Mixed hyperlipidemia    4. Pericardial effusion    5. Late effects of CVA (cerebrovascular accident)    6. Drug abuse    7. DVT (deep vein thrombosis) in pregnancy      Left leg swelling after DVT  CHFpEF commentated,limited exercise due to back pain and weakness  Severe multi-vessel CAD no CP. Not candidate for PCi and cabg. Had extensive evaluations by interventional cardiologists and cardiac surgeon at [facility] and [facility].   BP, A1c and LDL wnl    Plan:   D/c Nifedipine  Increase ToprolXl from 25 mg qd to 25 mg bid  Continue ASA, Eliquis lipitor,  Lisinopril 10mg and Imdur 60 mg bid  Daily weight. Please call the office if gain 3 pounds in 1 day or 5 pounds in 1 week.  Fluid restriction 1.5 liters a day  Na< 2 gm  Lasix 20 mg MWFS for left leg swelling prn  RTC in 3 monttnh

## 2019-03-22 ENCOUNTER — TELEPHONE (OUTPATIENT)
Dept: CARDIOLOGY | Facility: CLINIC | Age: 54
End: 2019-03-22

## 2019-03-22 NOTE — TELEPHONE ENCOUNTER
Pt called. Her rx for metoprolol was sent to the wrong pharmacy.[facility] and [facility] be [facility] to . I called and cancelled rx called in to [facility] and [facility]. Pt notified.

## 2019-04-10 ENCOUNTER — DOCUMENTATION ONLY (OUTPATIENT)
Dept: CARDIOTHORACIC SURGERY | Facility: CLINIC | Age: 54
End: 2019-04-10

## 2019-04-10 NOTE — PROGRESS NOTES
The following records have been received by [facility]:    * Cardiac Stress Test (9-8-18): report  * [facility] (9-10-18): report    Patient states she will bring [facility] disc to appt.

## 2019-04-12 ENCOUNTER — TELEPHONE (OUTPATIENT)
Dept: CARDIOLOGY | Facility: CLINIC | Age: 54
End: 2019-04-12

## 2019-04-12 RX ORDER — LISINOPRIL 20 MG/1
20 TABLET ORAL DAILY
Qty: 30 TABLET | Refills: 11 | Status: SHIPPED | OUTPATIENT
Start: 2019-04-12 | End: 2019-04-18 | Stop reason: SDUPTHER

## 2019-04-12 NOTE — TELEPHONE ENCOUNTER
Spoke with Tracee at [phone] [facility] who stated pts bp was 162/103.  Pt stated she felt fine, just a mild headache from time to time.  Spoke with pt who stated she is taking all her BP medications.  Pt does have a cardio appt in [facility] on Tuesday.  Will let Dr Weldon know to see if he wants to change BP meds.     ----- Message from Leno Carranza sent at 4/12/2019  2:35 PM CDT -----  Contact: [phone]  Would like to consult with nurse regarding question about pt's blood pressure medication.  Please call back at [phone].  Md Diaz

## 2019-04-16 ENCOUNTER — OFFICE VISIT (OUTPATIENT)
Dept: CARDIOTHORACIC SURGERY | Facility: CLINIC | Age: 54
End: 2019-04-16
Payer: MEDICAID

## 2019-04-16 VITALS
BODY MASS INDEX: 37.98 KG/M2 | SYSTOLIC BLOOD PRESSURE: 156 MMHG | WEIGHT: 214.38 LBS | OXYGEN SATURATION: 98 % | HEIGHT: 63 IN | HEART RATE: 85 BPM | TEMPERATURE: 98 F | DIASTOLIC BLOOD PRESSURE: 96 MMHG

## 2019-04-16 DIAGNOSIS — I25.118 CORONARY ARTERY DISEASE OF NATIVE ARTERY OF NATIVE HEART WITH STABLE ANGINA PECTORIS: Primary | ICD-10-CM

## 2019-04-16 PROCEDURE — 99213 OFFICE O/P EST LOW 20 MIN: CPT | Mod: PBBFAC | Performed by: THORACIC SURGERY (CARDIOTHORACIC VASCULAR SURGERY)

## 2019-04-16 PROCEDURE — 99213 OFFICE O/P EST LOW 20 MIN: CPT | Mod: S$PBB,,, | Performed by: THORACIC SURGERY (CARDIOTHORACIC VASCULAR SURGERY)

## 2019-04-16 PROCEDURE — 99999 PR PBB SHADOW E&M-EST. PATIENT-LVL III: CPT | Mod: PBBFAC,,, | Performed by: THORACIC SURGERY (CARDIOTHORACIC VASCULAR SURGERY)

## 2019-04-16 PROCEDURE — 99213 PR OFFICE/OUTPT VISIT, EST, LEVL III, 20-29 MIN: ICD-10-PCS | Mod: S$PBB,,, | Performed by: THORACIC SURGERY (CARDIOTHORACIC VASCULAR SURGERY)

## 2019-04-16 PROCEDURE — 99999 PR PBB SHADOW E&M-EST. PATIENT-LVL III: ICD-10-PCS | Mod: PBBFAC,,, | Performed by: THORACIC SURGERY (CARDIOTHORACIC VASCULAR SURGERY)

## 2019-04-16 NOTE — PROGRESS NOTES
Consult  Cardiothoracic Surgery      SUBJECTIVE:     Reason for Consult: CABG workup    History of Present Illness: Tri Staton is a53 y.o. female alcohol, cigar and cocaine user with hypercholesterolemia, CAD stent placement 08/19 on aspirin, CVA 09/18 post-eloquis, NYHA II HFpEF (60-65%) right bundle branch block presented as self referral for surgical intervention consult of extensive coronary disease. Was evaluated by CT in [facility] and was not deemed an operative surgical candidate for CABG.   No chest pain, palpitations, or worsening leg pain. Increased SOB with minimal exertion (putting on clothes or brushing hair) and orthopnea.  Post stroke left sided weakness, paresthesias, and pain. No dizziness, LOC, worsening weakness/numbness, or vision changes.  No fevers, chills, or weight changes.    MHx:  CAD and CVA with residual left side weakness in , DVT 09/2016 on Eliquis, HTN, MI, hypercholesteremia, and neuropathy  SxHx: 2 c-sections (1985, 1986), PCI 08/2019  Social History:  Quit smoking 3 months, 1 cigar a day x 10 yrs  Quit drinking 6 months, 1 pint of liquor a day x 10 yrs  Smoked marijuana. Smoked cocaine x 10 yrs, stopped 1yr ago  remote IVDU 25 yrs ago  Anticoagulation: Eliquis, ASA      Current Outpatient Medications on File Prior to Visit   Medication Sig    amitriptyline (ELAVIL) 50 MG tablet Take 50 mg by mouth every evening.    apixaban (ELIQUIS) 5 mg Tab Take 1 tablet (5 mg total) by mouth 2 (two) times daily.    aspirin (ECOTRIN) 81 MG EC tablet Take 81 mg by mouth once daily.    atorvastatin (LIPITOR) 80 MG tablet Take 80 mg by mouth once daily.    cetirizine (ZYRTEC) 10 MG tablet Take 10 mg by mouth daily as needed.    docusate sodium (COLACE) 100 MG capsule Take 1 capsule (100 mg total) by mouth 2 (two) times daily as needed for Constipation.    famotidine (PEPCID) 20 MG tablet Take 20 mg by mouth 2 (two) times daily as needed.    furosemide (LASIX) 20 MG tablet  Take 1 tablet (20 mg total) by mouth every Mon, Wed, Fri, Sun.    gabapentin (NEURONTIN) 600 MG tablet Take 600 mg by mouth 3 (three) times daily.    hydrocortisone 2.5 % cream Apply topically 2 (two) times daily. for 7 days    isosorbide mononitrate (IMDUR) 60 MG 24 hr tablet Take 1 tablet (60 mg total) by mouth 2 (two) times daily.    lisinopril (PRINIVIL,ZESTRIL) 20 MG tablet Take 1 tablet (20 mg total) by mouth once daily.    MAVYRET 100-40 mg Tab TK 3 TS PO ONCE DAILY    metoprolol succinate (TOPROL-XL) 25 MG 24 hr tablet Take 1 tablet (25 mg total) by mouth 2 (two) times daily.    multivitamin (THERAGRAN) per tablet Take 1 tablet by mouth once daily.    nitroGLYCERIN (NITROSTAT) 0.4 MG SL tablet Place 1 tablet (0.4 mg total) under the tongue every 5 (five) minutes as needed for Chest pain.    oxybutynin (DITROPAN) 5 MG Tab Take 5 mg by mouth 2 (two) times daily.     No current facility-administered medications on file prior to visit.        Review of patient's allergies indicates:  No Known Allergies    Past Medical History:   Diagnosis Date    Coronary artery disease     Coronary artery disease of native artery of native heart with stable angina pectoris 2018    Hypercholesteremia     Hypertension     Neuropathy     Stroke      Past Surgical History:   Procedure Laterality Date     SECTION       Family History   Problem Relation Age of Onset    Hypertension Mother      Social History     Tobacco Use    Smoking status: Former Smoker    Smokeless tobacco: Never Used   Substance Use Topics    Alcohol use: No     Frequency: Never    Drug use: No        Review of Systems  Otherwise negative except as listed above    OBJECTIVE:     Vital Signs (Most Recent)  Temp: 97.8 °F (36.6 °C) (19 1032)  Pulse: 85 (19 1032)  BP: (!) 156/96 (19 1032)  SpO2: 98 % (19 1032)    Physical Exam  A&O, NAD  RRR  No Respiratory Distress    Diagnostic Results:    CTA  11/24/18  Heart/pericardium: Cardiomegaly and moderate pericardial effusion.    Pulmonary vasculature: Pulmonary arteries distribute normally.  No gross pulmonary embolism over evaluation is limited without contrast bolus.  There are four pulmonary veins.    Aorta: Left-sided aortic arch with 3 arterial branches.  The aorta maintains normal caliber, contour and course. There is moderate calcification of the thoracic aorta.  There is  severe coronary artery calcification.    TTE 1/29  CONCLUSIONS     1 - Mild left atrial enlargement.     2 - Concentric hypertrophy.     3 - No wall motion abnormalities.     4 - Normal left ventricular systolic function (EF 60-65%).     5 - Impaired LV relaxation, normal LAP (grade 1 diastolic dysfunction).     6 - Normal right ventricular systolic function .     7 - The estimated PA systolic pressure is greater than 12 mmHg.    ASSESSMENT/PLAN:     A/P:  Tri Staton is a 53 y.o. female with severe CAD s/p PCI 2019 presenting for CABG evaluation    CTS Attending Note:    I have personally taken the history and examined this patient and agree with the resident's note as stated above. 53-year-old woman with multiple medical problems including previous stroke.  She has significant dyspnea, but no angina.  She underwent a fall full and thorough evaluation which included coronary angiography.  This study demonstrated diffuse atherosclerotic disease.  Given her diffuse coronary disease, and her functional status, I think the risk of surgical revascularization far outweighs any potential benefit.  Recommend medical management +/-PCI.

## 2019-04-16 NOTE — LETTER
April 16, 2019      Christopher Weldon MD  [address] [facility] [place] [address]           [facility] Cardi[facility] S[facility]  [address]  [place] [address]  Phone: [phone]          Patient: Tri Staton   MR Number: [id number]   YOB: 1965   Date of Visit: 4/16/2019       Dear Dr. Christopher Weldon:    Thank you for referring Tri Staton to me for evaluation. Attached you will find relevant portions of my assessment and plan of care.    If you have questions, please do not hesitate to call me. I look forward to following Tri Staton along with you.    Sincerely,    Rachid Yanes MD    Enclosure  CC:  No Recipients    If you would like to receive this communication electronically, please contact [email or web address] or [phone] to request more information on [employer] access.    For providers and/or their staff who would like to refer a patient to [facility], please contact us through our one-stop-shop provider referral line, Jossy Kearns, at [phone].    If you feel you have received this communication in error or would no longer like to receive these types of communications, please e-mail [email or web address]

## 2019-04-18 ENCOUNTER — TELEPHONE (OUTPATIENT)
Dept: PULMONOLOGY | Facility: CLINIC | Age: 54
End: 2019-04-18

## 2019-04-18 RX ORDER — LISINOPRIL 20 MG/1
20 TABLET ORAL DAILY
Qty: 30 TABLET | Refills: 11 | Status: SHIPPED | OUTPATIENT
Start: 2019-04-18 | End: 2022-03-07 | Stop reason: SDUPTHER

## 2019-05-10 ENCOUNTER — TELEPHONE (OUTPATIENT)
Dept: VASCULAR SURGERY | Facility: CLINIC | Age: 54
End: 2019-05-10

## 2019-05-16 ENCOUNTER — OFFICE VISIT (OUTPATIENT)
Dept: PULMONOLOGY | Facility: CLINIC | Age: 54
End: 2019-05-16
Payer: MEDICAID

## 2019-05-16 VITALS
DIASTOLIC BLOOD PRESSURE: 80 MMHG | SYSTOLIC BLOOD PRESSURE: 118 MMHG | HEIGHT: 63 IN | HEART RATE: 96 BPM | OXYGEN SATURATION: 95 % | BODY MASS INDEX: 40.71 KG/M2 | WEIGHT: 229.75 LBS

## 2019-05-16 DIAGNOSIS — G47.33 OSA (OBSTRUCTIVE SLEEP APNEA): ICD-10-CM

## 2019-05-16 DIAGNOSIS — I63.9 CEREBROVASCULAR ACCIDENT (CVA), UNSPECIFIED MECHANISM: ICD-10-CM

## 2019-05-16 DIAGNOSIS — I25.118 CORONARY ARTERY DISEASE OF NATIVE ARTERY OF NATIVE HEART WITH STABLE ANGINA PECTORIS: Primary | ICD-10-CM

## 2019-05-16 PROCEDURE — 99214 OFFICE O/P EST MOD 30 MIN: CPT | Mod: PBBFAC | Performed by: NURSE PRACTITIONER

## 2019-05-16 PROCEDURE — 99999 PR PBB SHADOW E&M-EST. PATIENT-LVL IV: ICD-10-PCS | Mod: PBBFAC,,, | Performed by: NURSE PRACTITIONER

## 2019-05-16 PROCEDURE — 99214 PR OFFICE/OUTPT VISIT, EST, LEVL IV, 30-39 MIN: ICD-10-PCS | Mod: S$PBB,,, | Performed by: NURSE PRACTITIONER

## 2019-05-16 PROCEDURE — 99214 OFFICE O/P EST MOD 30 MIN: CPT | Mod: S$PBB,,, | Performed by: NURSE PRACTITIONER

## 2019-05-16 PROCEDURE — 99999 PR PBB SHADOW E&M-EST. PATIENT-LVL IV: CPT | Mod: PBBFAC,,, | Performed by: NURSE PRACTITIONER

## 2019-05-16 RX ORDER — HYDRALAZINE HYDROCHLORIDE 25 MG/1
25 TABLET, FILM COATED ORAL 3 TIMES DAILY
Refills: 11 | COMMUNITY
Start: 2019-05-08 | End: 2019-06-25 | Stop reason: SDUPTHER

## 2019-05-16 NOTE — ASSESSMENT & PLAN NOTE
Discussed importance of treatment for sleep apnea. Continued treatment with PAP is still recommended and to improve compliance. Medical recommendation is not based on insurance guidelines for payment. Discuss payment plan for PAP with DME company.  Untreated sleep apnea can be detrimental to health.

## 2019-05-16 NOTE — PROGRESS NOTES
"Subjective:      Patient ID: Tri Staton is a 53 y.o. female.    Chief Complaint: Sleep Apnea    HPI  Patient presents to the office today for evaluation of sleep apnea.  Patient with recent polysomnogram and CPAP titration.  Patient with snoring witnessed apneas.  History of CAG, CVA.  She does not feel refreshed when she wakes up in the morning.  [place] Sleepiness Scale score 19. She was given CPAP on 2/21/19. She did not turn on device b/c there was not a plug close enough to her bed.     DX AHI 11/hr      Patient Active Problem List   Diagnosis    Coronary artery disease of native artery of native heart with stable angina pectoris    Pericardial effusion    CVA (cerebral vascular accident)    Essential hypertension    Controlled type 2 diabetes mellitus, without long-term current use of insulin    Mixed hyperlipidemia    Ex-cigar smoker    History of alcohol abuse    History of cocaine abuse    DVT (deep vein thrombosis) in pregnancy    VTE (venous thromboembolism)    Acute bronchitis    Mucoid impaction of bronchi    Discoid atelectasis    Suspected sleep apnea    Late effects of CVA (cerebrovascular accident)    SIRS (systemic inflammatory response syndrome)    KORI (acute kidney injury)    Hypercoagulable state    Trichomonal cystitis    HALIE (obstructive sleep apnea)    Drug abuse       /80   Pulse 96   Ht 5' 3" (1.6 m)   Wt 104.2 kg (229 lb 11.5 oz)   LMP  (LMP Unknown)   SpO2 95%   BMI 40.69 kg/m²   Body mass index is 40.69 kg/m².    Review of Systems   Constitutional: Positive for fatigue.   Respiratory: Positive for dyspnea on extertion.    Musculoskeletal: Positive for gait problem.   Psychiatric/Behavioral: Positive for sleep disturbance.   All other systems reviewed and are negative.    Objective:      Physical Exam   Constitutional: She is oriented to person, place, and time. She appears well-developed and well-nourished.   obese   HENT:   Head: " Normocephalic and atraumatic.   Neck: Normal range of motion. Neck supple.   Cardiovascular: Normal rate and regular rhythm.   Pulmonary/Chest: Effort normal and breath sounds normal.   Abdominal: Soft.   Musculoskeletal: Normal range of motion.   Left sided weakness   Neurological: She is alert and oriented to person, place, and time.   Skin: Skin is warm and dry.   Psychiatric: She has a normal mood and affect.     Personal Diagnostic Review  No available data on pap. She did not use      Assessment:       1. Coronary artery disease of native artery of native heart with stable angina pectoris    2. HALIE (obstructive sleep apnea)    3. Cerebrovascular accident (CVA), unspecified mechanism        Outpatient Encounter Medications as of 5/16/2019   Medication Sig Dispense Refill    amitriptyline (ELAVIL) 50 MG tablet Take 50 mg by mouth every evening.      apixaban (ELIQUIS) 5 mg Tab Take 1 tablet (5 mg total) by mouth 2 (two) times daily. 180 tablet 3    aspirin (ECOTRIN) 81 MG EC tablet Take 81 mg by mouth once daily.      atorvastatin (LIPITOR) 80 MG tablet Take 80 mg by mouth once daily.      cetirizine (ZYRTEC) 10 MG tablet Take 10 mg by mouth daily as needed.  2    docusate sodium (COLACE) 100 MG capsule Take 1 capsule (100 mg total) by mouth 2 (two) times daily as needed for Constipation. 40 capsule 0    famotidine (PEPCID) 20 MG tablet Take 20 mg by mouth 2 (two) times daily as needed.  2    furosemide (LASIX) 20 MG tablet Take 1 tablet (20 mg total) by mouth every Mon, Wed, Fri, Sun. 16 tablet 3    gabapentin (NEURONTIN) 600 MG tablet Take 600 mg by mouth 3 (three) times daily.      hydrALAZINE (APRESOLINE) 25 MG tablet Take 25 mg by mouth 3 (three) times daily.  11    hydrocortisone 2.5 % cream Apply topically 2 (two) times daily. for 7 days 1 Tube 0    isosorbide mononitrate (IMDUR) 60 MG 24 hr tablet Take 1 tablet (60 mg total) by mouth 2 (two) times daily. 60 tablet 5    lisinopril  (PRINIVIL,ZESTRIL) 20 MG tablet Take 1 tablet (20 mg total) by mouth once daily. 30 tablet 11    MAVYRET 100-40 mg Tab TK 3 TS PO ONCE DAILY  1    metoprolol succinate (TOPROL-XL) 25 MG 24 hr tablet Take 1 tablet (25 mg total) by mouth 2 (two) times daily. 60 tablet 5    multivitamin (THERAGRAN) per tablet Take 1 tablet by mouth once daily.  3    nitroGLYCERIN (NITROSTAT) 0.4 MG SL tablet Place 1 tablet (0.4 mg total) under the tongue every 5 (five) minutes as needed for Chest pain. 25 tablet 3    oxybutynin (DITROPAN) 5 MG Tab Take 5 mg by mouth 2 (two) times daily.       No facility-administered encounter medications on file as of 5/16/2019.      No orders of the defined types were placed in this encounter.    Plan:        Problem List Items Addressed This Visit        Neuro    CVA (cerebral vascular accident)       Cardiac/Vascular    Coronary artery disease of native artery of native heart with stable angina pectoris - Primary       Other    HALIE (obstructive sleep apnea)    Overview     Returned PAP due to lack of payment from insurance because of noncompliance.         Current Assessment & Plan     Discussed importance of treatment for sleep apnea. Continued treatment with PAP is still recommended and to improve compliance. Medical recommendation is not based on insurance guidelines for payment. Discuss payment plan for PAP with [facility] [employer] company.  Untreated sleep apnea can be detrimental to health.

## 2019-06-25 ENCOUNTER — OFFICE VISIT (OUTPATIENT)
Dept: CARDIOLOGY | Facility: CLINIC | Age: 54
End: 2019-06-25
Payer: MEDICAID

## 2019-06-25 VITALS — DIASTOLIC BLOOD PRESSURE: 80 MMHG | HEART RATE: 88 BPM | SYSTOLIC BLOOD PRESSURE: 118 MMHG

## 2019-06-25 DIAGNOSIS — I10 ESSENTIAL HYPERTENSION: ICD-10-CM

## 2019-06-25 DIAGNOSIS — I25.118 CORONARY ARTERY DISEASE OF NATIVE ARTERY OF NATIVE HEART WITH STABLE ANGINA PECTORIS: Primary | ICD-10-CM

## 2019-06-25 DIAGNOSIS — I63.9 CEREBROVASCULAR ACCIDENT (CVA), UNSPECIFIED MECHANISM: ICD-10-CM

## 2019-06-25 DIAGNOSIS — E78.2 MIXED HYPERLIPIDEMIA: ICD-10-CM

## 2019-06-25 DIAGNOSIS — I50.32 CHRONIC DIASTOLIC CONGESTIVE HEART FAILURE: ICD-10-CM

## 2019-06-25 DIAGNOSIS — G47.33 OSA (OBSTRUCTIVE SLEEP APNEA): ICD-10-CM

## 2019-06-25 DIAGNOSIS — O22.30 DVT (DEEP VEIN THROMBOSIS) IN PREGNANCY: ICD-10-CM

## 2019-06-25 DIAGNOSIS — E11.9 CONTROLLED TYPE 2 DIABETES MELLITUS WITHOUT COMPLICATION, WITHOUT LONG-TERM CURRENT USE OF INSULIN: ICD-10-CM

## 2019-06-25 PROCEDURE — 99213 OFFICE O/P EST LOW 20 MIN: CPT | Mod: PBBFAC | Performed by: INTERNAL MEDICINE

## 2019-06-25 PROCEDURE — 99214 PR OFFICE/OUTPT VISIT, EST, LEVL IV, 30-39 MIN: ICD-10-PCS | Mod: S$PBB,,, | Performed by: INTERNAL MEDICINE

## 2019-06-25 PROCEDURE — 99999 PR PBB SHADOW E&M-EST. PATIENT-LVL III: CPT | Mod: PBBFAC,,, | Performed by: INTERNAL MEDICINE

## 2019-06-25 PROCEDURE — 99214 OFFICE O/P EST MOD 30 MIN: CPT | Mod: S$PBB,,, | Performed by: INTERNAL MEDICINE

## 2019-06-25 PROCEDURE — 99999 PR PBB SHADOW E&M-EST. PATIENT-LVL III: ICD-10-PCS | Mod: PBBFAC,,, | Performed by: INTERNAL MEDICINE

## 2019-06-25 RX ORDER — FUROSEMIDE 40 MG/1
40 TABLET ORAL DAILY
Qty: 30 TABLET | Refills: 5 | Status: SHIPPED | OUTPATIENT
Start: 2019-06-25 | End: 2019-12-11 | Stop reason: SDUPTHER

## 2019-06-25 RX ORDER — HYDROCORTISONE 25 MG/G
CREAM TOPICAL 2 TIMES DAILY
Qty: 1 TUBE | Refills: 5 | Status: SHIPPED | OUTPATIENT
Start: 2019-06-25 | End: 2020-07-28

## 2019-06-25 RX ORDER — HYDRALAZINE HYDROCHLORIDE 25 MG/1
25 TABLET, FILM COATED ORAL EVERY 8 HOURS
Qty: 90 TABLET | Refills: 11 | Status: SHIPPED | OUTPATIENT
Start: 2019-06-25 | End: 2020-02-13 | Stop reason: SDUPTHER

## 2019-06-25 NOTE — PROGRESS NOTES
Subjective:   Patient ID:  Tri Staton is a 53 y.o. female who presents for follow up of Follow-up; Leg Swelling; Shortness of Breath; Cough; and Coronary Artery Disease      54 yo female, f/u for severe multi-vessel CAD. Wheelchair bound after CVA.  PMH recent Dx of CAD and CVA with residual left side weakness in , DVT, HTN, MI, hypercholesteremia, and neuropathy. H/o drug abuse. HALIE CPAP Rx pending  Severe multivessel Dz. Not candidate for CABG or PCI. Continue on medical Rx  , had chest pain, dyspnea and could not move. Went to [facility]. Dx of CVA. D/c'ed. Few days later, felt worse and fever. Went to hospital again. Had cath done and CABG was advised. Pt decided to go back to  for medical care.   Evaluated by Dr. Steele CVT at [facility] in  and due to diffuse coronary disease and high risk, she is not candidate for CABG. Subsequently she had cardiac evaluation at [facility] cardiology and thought was not candidate for either PCI or CABG. Also went through cath imaging with Dr. Torres and no PCI indicated.   Went to ER [facility] on 09/16 for LLE swelling and pain.  US revealed positive DVT and started eliquis. Chest CTA moderate pericardial effusion and no PE. D/c'ed from ER.  10/04, c/o chest pain and weakness. With hemarroids. Sent to ER and troponin < 0.006 and EKG did not show acute stt change. BNP normal  : [facility] showed normal EF and moderate pericardial effusion. No tamponade.   BNP 17 in   Quit smoking 3 months, 1 cigar a day.x 10 yrs  Quit drinking 6 months, 1 pint of liquor a day x10 yrs.  Smoked marijuana  Smoked cocaines one yr ago for 10 yrs, remote IV cocaine 25 yrs ago.    C/o SOB and leg swelling recently. Sleeps with two pillows. No PND, palpitation and dizziness.   Limited walking due to back pain. No orthopnea.   No chest pain,           Past Medical History:   Diagnosis Date    Coronary artery disease     Coronary artery  disease of native artery of native heart with stable angina pectoris 2018    Hypercholesteremia     Hypertension     Neuropathy     Stroke        Past Surgical History:   Procedure Laterality Date     SECTION         Social History     Tobacco Use    Smoking status: Former Smoker    Smokeless tobacco: Never Used   Substance Use Topics    Alcohol use: No     Frequency: Never    Drug use: No       Family History   Problem Relation Age of Onset    Hypertension Mother          Review of Systems   Constitution: Negative for decreased appetite, diaphoresis, fever, malaise/fatigue and night sweats.   HENT: Negative for nosebleeds.    Eyes: Negative for blurred vision and double vision.   Cardiovascular: Positive for dyspnea on exertion and leg swelling. Negative for chest pain, claudication, irregular heartbeat, near-syncope, orthopnea, palpitations, paroxysmal nocturnal dyspnea and syncope.   Respiratory: Negative for cough, shortness of breath, sleep disturbances due to breathing, snoring, sputum production and wheezing.    Endocrine: Negative for cold intolerance and polyuria.   Hematologic/Lymphatic: Does not bruise/bleed easily.   Skin: Negative for rash.   Musculoskeletal: Positive for muscle weakness. Negative for back pain, falls, joint pain, joint swelling and neck pain.   Gastrointestinal: Negative for abdominal pain, heartburn, nausea and vomiting.   Genitourinary: Negative for dysuria, frequency and hematuria.   Neurological: Positive for loss of balance. Negative for difficulty with concentration, dizziness, focal weakness, headaches, light-headedness, numbness, seizures and weakness.   Psychiatric/Behavioral: Negative for depression, memory loss and substance abuse. The patient does not have insomnia.    Allergic/Immunologic: Negative for HIV exposure and hives.       Objective:   Physical Exam   Constitutional: She is oriented to person, place, and time. She appears well-nourished.    HENT:   Head: Normocephalic.   Eyes: Pupils are equal, round, and reactive to light.   Neck: Normal carotid pulses and no JVD present. Carotid bruit is not present. No thyromegaly present.   Cardiovascular: Normal rate, regular rhythm, normal heart sounds and normal pulses.  No extrasystoles are present. PMI is not displaced. Exam reveals no gallop and no S3.   No murmur heard.  Pulmonary/Chest: Breath sounds normal. No stridor. No respiratory distress.   Abdominal: Soft. Bowel sounds are normal. There is no tenderness. There is no rebound.   Musculoskeletal: Normal range of motion. She exhibits edema.   1+ edema on LLE   Neurological: She is alert and oriented to person, place, and time.   Skin: Skin is intact. No rash noted.   Psychiatric: Her behavior is normal.       Lab Results   Component Value Date    CHOL 80 (L) 11/24/2018     Lab Results   Component Value Date    HDL 22 (L) 11/24/2018     Lab Results   Component Value Date    LDLCALC 42.8 (L) 11/24/2018     Lab Results   Component Value Date    TRIG 76 11/24/2018     Lab Results   Component Value Date    CHOLHDL 27.5 11/24/2018       Chemistry        Component Value Date/Time     12/12/2018 2200    K 4.5 12/12/2018 2200     (H) 12/12/2018 2200    CO2 22 (L) 12/12/2018 2200    BUN 11 12/12/2018 2200    CREATININE 1.0 12/12/2018 2200     12/12/2018 2200        Component Value Date/Time    CALCIUM 9.1 12/12/2018 2200    ALKPHOS 73 12/12/2018 2200    AST 78 (H) 12/12/2018 2200    ALT 51 (H) 12/12/2018 2200    BILITOT 0.4 12/12/2018 2200    ESTGFRAFRICA >60 12/12/2018 2200    EGFRNONAA >60 12/12/2018 2200          Lab Results   Component Value Date    HGBA1C 5.0 11/24/2018     Lab Results   Component Value Date    TSH 0.605 11/24/2018     Lab Results   Component Value Date    INR 1.2 11/03/2018    INR 1.2 10/04/2018    INR 1.2 09/27/2018     Lab Results   Component Value Date    WBC 3.68 (L) 12/12/2018    HGB 12.1 12/12/2018    HCT 37.6  12/12/2018    MCV 92 12/12/2018     12/12/2018     BMP  Sodium   Date Value Ref Range Status   12/12/2018 141 136 - 145 mmol/L Final     Potassium   Date Value Ref Range Status   12/12/2018 4.5 3.5 - 5.1 mmol/L Final     Chloride   Date Value Ref Range Status   12/12/2018 111 (H) 95 - 110 mmol/L Final     CO2   Date Value Ref Range Status   12/12/2018 22 (L) 23 - 29 mmol/L Final     BUN, Bld   Date Value Ref Range Status   12/12/2018 11 6 - 20 mg/dL Final     Creatinine   Date Value Ref Range Status   12/12/2018 1.0 0.5 - 1.4 mg/dL Final     Calcium   Date Value Ref Range Status   12/12/2018 9.1 8.7 - 10.5 mg/dL Final     Anion Gap   Date Value Ref Range Status   12/12/2018 8 8 - 16 mmol/L Final     eGFR if    Date Value Ref Range Status   12/12/2018 >60 >60 mL/min/1.73 m^2 Final     eGFR if non    Date Value Ref Range Status   12/12/2018 >60 >60 mL/min/1.73 m^2 Final     Comment:     Calculation used to obtain the estimated glomerular filtration  rate (eGFR) is the CKD-EPI equation.        BNP  @LABRCNTIP(BNP,BNPTRIAGEBLO)@  @LABRCNTIP(troponini)@  CrCl cannot be calculated (Patient's most recent lab result is older than the maximum 7 days allowed.).  No results found in the last 24 hours.  No results found in the last 24 hours.  No results found in the last 24 hours.    Assessment:      1. Coronary artery disease of native artery of native heart with stable angina pectoris    2. Chronic diastolic congestive heart failure    3. Essential hypertension    4. Mixed hyperlipidemia    5. Cerebrovascular accident (CVA), unspecified mechanism    6. DVT (deep vein thrombosis) in pregnancy    7. Controlled type 2 diabetes mellitus without complication, without long-term current use of insulin    8. HALIE (obstructive sleep apnea)      CHfpEF fluid overloaded.  No CP  BP, LDL and BS wnl  Plan:   Increase Lasix to 40 mg daily and repeat BMP in 2 weeks  Refer to PT  Continue ASA 81mg,  Eliquis 5 mg bid for DVT, ToprolXL, Lisinopril, hydralazine, Imdur, and Lipitor  DASH  Continue current meds.  Recommend heart-healthy diet, weight control and regular exercise.  Sukhi. Risk modification.   RTC in 2 months    I have reviewed all pertinent labs and cardiac studies. Plans and recommendations have been formulated under my direct supervision. All questions answered and patient voiced understanding. Patient to continue current medications.

## 2019-08-16 RX ORDER — FUROSEMIDE 20 MG/1
TABLET ORAL
Qty: 16 TABLET | Refills: 11 | Status: SHIPPED | OUTPATIENT
Start: 2019-08-16 | End: 2021-02-15

## 2019-09-16 ENCOUNTER — LAB VISIT (OUTPATIENT)
Dept: LAB | Facility: HOSPITAL | Age: 54
End: 2019-09-16
Attending: INTERNAL MEDICINE
Payer: MEDICAID

## 2019-09-16 DIAGNOSIS — I50.32 CHRONIC DIASTOLIC CONGESTIVE HEART FAILURE: ICD-10-CM

## 2019-09-16 LAB
ANION GAP SERPL CALC-SCNC: 11 MMOL/L (ref 8–16)
BUN SERPL-MCNC: 22 MG/DL (ref 6–20)
CALCIUM SERPL-MCNC: 9.3 MG/DL (ref 8.7–10.5)
CHLORIDE SERPL-SCNC: 105 MMOL/L (ref 95–110)
CO2 SERPL-SCNC: 23 MMOL/L (ref 23–29)
CREAT SERPL-MCNC: 1.3 MG/DL (ref 0.5–1.4)
EST. GFR  (AFRICAN AMERICAN): 53 ML/MIN/1.73 M^2
EST. GFR  (NON AFRICAN AMERICAN): 46 ML/MIN/1.73 M^2
GLUCOSE SERPL-MCNC: 92 MG/DL (ref 70–110)
POTASSIUM SERPL-SCNC: 4.9 MMOL/L (ref 3.5–5.1)
SODIUM SERPL-SCNC: 139 MMOL/L (ref 136–145)

## 2019-09-16 PROCEDURE — 36415 COLL VENOUS BLD VENIPUNCTURE: CPT

## 2019-09-16 PROCEDURE — 80048 BASIC METABOLIC PNL TOTAL CA: CPT

## 2019-09-19 ENCOUNTER — TELEPHONE (OUTPATIENT)
Dept: CARDIOLOGY | Facility: CLINIC | Age: 54
End: 2019-09-19

## 2019-09-19 DIAGNOSIS — I50.32 CHRONIC DIASTOLIC CONGESTIVE HEART FAILURE: Primary | ICD-10-CM

## 2019-09-19 NOTE — TELEPHONE ENCOUNTER
----- Message from Christopher Weldon MD sent at 9/19/2019  9:16 AM CDT -----  Lab showed slight elevation of Cr   Repeat BMP in 4 weeks

## 2019-10-23 ENCOUNTER — TELEPHONE (OUTPATIENT)
Dept: CARDIOLOGY | Facility: CLINIC | Age: 54
End: 2019-10-23

## 2019-10-23 NOTE — TELEPHONE ENCOUNTER
Christopher Weldon MD  You 2 minutes ago (2:07 PM)      Advise to f/u with pcp first       The patient has been notified of this information and all questions answered.

## 2019-10-23 NOTE — TELEPHONE ENCOUNTER
----- Message from Edgar Vásquez sent at 10/23/2019 11:32 AM CDT -----  Contact: PT   Type:  Needs Medical Advice    Who Called: Pt   Symptoms (please be specific): severe coughing    How long has patient had these symptoms: last few days  Pharmacy name and phone #:  [facility].   Would the patient rather a call back or a response via [facility]? Call back   Best Call Back Number: [phone] (home) or [phone]   Additional Information: n/a

## 2019-11-21 DIAGNOSIS — I25.118 CORONARY ARTERY DISEASE OF NATIVE ARTERY OF NATIVE HEART WITH STABLE ANGINA PECTORIS: ICD-10-CM

## 2019-11-21 RX ORDER — NITROGLYCERIN 0.4 MG/1
0.4 TABLET SUBLINGUAL EVERY 5 MIN PRN
Qty: 25 TABLET | Refills: 6 | Status: SHIPPED | OUTPATIENT
Start: 2019-11-21 | End: 2019-11-21 | Stop reason: SDUPTHER

## 2019-11-21 RX ORDER — NITROGLYCERIN 0.4 MG/1
0.4 TABLET SUBLINGUAL EVERY 5 MIN PRN
Qty: 25 TABLET | Refills: 6 | Status: SHIPPED | OUTPATIENT
Start: 2019-11-21

## 2019-11-21 NOTE — TELEPHONE ENCOUNTER
Approved Medications      nitroGLYCERIN (NITROSTAT) 0.4 MG SL tablet         Sig: Place 1 tablet (0.4 mg total) under the tongue every 5 (five) minutes as needed for Chest pain.    Disp:  25 tablet    Refills:  6    Start: 11/21/2019    Class: Normal    Authorized by: Jean-Paul Rodriguez MD    For: Coronary artery disease of native artery of native heart with stable angina pectoris        To be filled at: [facility] [place]        Sent to [facility] as requested.

## 2019-11-21 NOTE — TELEPHONE ENCOUNTER
----- Message from Leno Dillon sent at 11/21/2019 11:37 AM CST -----  Contact: self [phone]  .Type:  RX Refill Request    Who Called: Tri Staton  Refill or New Rx:refill  RX Name and Strength:Nitroglycerin  How is the patient currently taking it? (ex. 1XDay):Daily  Is this a 30 day or 90 day RX:30  Preferred Pharmacy with phone number:.  [facility] [address] [place] [address]  Phone: [phone] Fax: [phone]    [id number] [place] [id number] [facility] [address] [place] [address]  Phone: [phone] Fax: [phone]      Local or Mail Order:local  Ordering Provider:Dr. Weldon  Would the patient rather a call back or a response via [facility]? Call back  Best Call Back Number:[phone]  Additional Information:

## 2019-12-11 RX ORDER — FUROSEMIDE 40 MG/1
TABLET ORAL
Qty: 30 TABLET | Refills: 5 | Status: SHIPPED | OUTPATIENT
Start: 2019-12-11 | End: 2020-02-13 | Stop reason: SDUPTHER

## 2019-12-11 RX ORDER — LISINOPRIL 40 MG/1
TABLET ORAL
Qty: 30 TABLET | Refills: 11 | Status: SHIPPED | OUTPATIENT
Start: 2019-12-11 | End: 2020-01-06

## 2020-01-06 RX ORDER — LISINOPRIL 40 MG/1
TABLET ORAL
Qty: 30 TABLET | Refills: 11 | Status: SHIPPED | OUTPATIENT
Start: 2020-01-06 | End: 2020-01-08

## 2020-01-08 RX ORDER — LISINOPRIL 40 MG/1
TABLET ORAL
Qty: 30 TABLET | Refills: 11 | Status: SHIPPED | OUTPATIENT
Start: 2020-01-08 | End: 2020-01-10

## 2020-01-10 RX ORDER — LISINOPRIL 40 MG/1
TABLET ORAL
Qty: 30 TABLET | Refills: 11 | Status: SHIPPED | OUTPATIENT
Start: 2020-01-10 | End: 2021-11-08

## 2020-02-12 ENCOUNTER — TELEPHONE (OUTPATIENT)
Dept: CARDIOLOGY | Facility: CLINIC | Age: 55
End: 2020-02-12

## 2020-02-12 NOTE — TELEPHONE ENCOUNTER
----- Message from Armando Stephenson sent at 2/12/2020 12:55 PM CST -----  Contact: Patient  Patient called in and wanted to speak with someone from the office regarding several prescriptions. She would like a call back from the office and can be reached at    [phone]

## 2020-02-13 RX ORDER — HYDRALAZINE HYDROCHLORIDE 25 MG/1
25 TABLET, FILM COATED ORAL EVERY 8 HOURS
Qty: 90 TABLET | Refills: 6 | Status: SHIPPED | OUTPATIENT
Start: 2020-02-13 | End: 2022-01-12 | Stop reason: SDUPTHER

## 2020-02-13 RX ORDER — FUROSEMIDE 40 MG/1
40 TABLET ORAL DAILY
Qty: 30 TABLET | Refills: 5 | Status: SHIPPED | OUTPATIENT
Start: 2020-02-13 | End: 2020-07-08 | Stop reason: SDUPTHER

## 2020-02-13 NOTE — TELEPHONE ENCOUNTER
----- Message from Marika Sandoval sent at 2/13/2020  1:58 PM CST -----  Contact: Pt   Pt is calling .Type:  RX Refill Request    Who Called: Pt   Refill or New Rx: Refill   RX Name and Strength: cetirizine (ZYRTEC) 10 MG tablet and Asprin   How is the patient currently taking it? (ex. 1XDay):   Is this a 30 day or 90 day RX:   Preferred Pharmacy with phone number: ..  [facility]/pharmacy #[id number] - [place] - [address] AT [facility] [address] [facility]  [address] [address] [place] [address]  Phone: [phone] Fax: [phone]  Local or Mail Order: Local   Ordering Provider: DR. Weldon   Would the patient rather a call back or a response via [facility]?  Call Back   Best Call Back Number: [phone]           .Thank You  Marika Sandoval

## 2020-03-27 DIAGNOSIS — I25.118 CORONARY ARTERY DISEASE OF NATIVE ARTERY OF NATIVE HEART WITH STABLE ANGINA PECTORIS: ICD-10-CM

## 2020-03-30 RX ORDER — ISOSORBIDE MONONITRATE 60 MG/1
60 TABLET, EXTENDED RELEASE ORAL 2 TIMES DAILY
Qty: 60 TABLET | Refills: 11 | Status: SHIPPED | OUTPATIENT
Start: 2020-03-30 | End: 2021-04-12

## 2020-04-08 RX ORDER — METOPROLOL SUCCINATE 25 MG/1
TABLET, EXTENDED RELEASE ORAL
Qty: 60 TABLET | Refills: 11 | Status: SHIPPED | OUTPATIENT
Start: 2020-04-08 | End: 2021-09-30 | Stop reason: SDUPTHER

## 2020-04-22 ENCOUNTER — TELEPHONE (OUTPATIENT)
Dept: CARDIOLOGY | Facility: CLINIC | Age: 55
End: 2020-04-22

## 2020-04-22 DIAGNOSIS — I25.118 CORONARY ARTERY DISEASE OF NATIVE ARTERY OF NATIVE HEART WITH STABLE ANGINA PECTORIS: Primary | ICD-10-CM

## 2020-04-22 NOTE — TELEPHONE ENCOUNTER
----- Message from Joey Cervantes LPN sent at 4/22/2020  3:49 PM CDT -----  [employer] called to see if you want labs on this patient.  Please advise.    [phone]

## 2020-05-12 ENCOUNTER — TELEPHONE (OUTPATIENT)
Dept: CARDIOLOGY | Facility: CLINIC | Age: 55
End: 2020-05-12

## 2020-05-12 ENCOUNTER — NURSE TRIAGE (OUTPATIENT)
Dept: ADMINISTRATIVE | Facility: CLINIC | Age: 55
End: 2020-05-12

## 2020-05-12 RX ORDER — RANOLAZINE 500 MG/1
500 TABLET, EXTENDED RELEASE ORAL 2 TIMES DAILY
Qty: 60 TABLET | Refills: 11 | Status: SHIPPED | OUTPATIENT
Start: 2020-05-12 | End: 2020-10-01 | Stop reason: SDUPTHER

## 2020-05-12 NOTE — TELEPHONE ENCOUNTER
I gave a verbal order for Ranexa 500 mg BID # 60 with 11 refills to [facility] at [facility] pharmacy per Dr Weldon

## 2020-05-12 NOTE — TELEPHONE ENCOUNTER
Flor, nurse with [employer] .  Pt having chest pain around 0930 Rated it 5/10, radiated to the left neck and jaw, lasted about 10 minutes, described as heaviness relieved with nitroglycerine tab.  CP returned approximately 1030 4/10, lasted about 10 minutes, constant heaviness, went away on it's own, did not take nitroglycerine.  Advised ED, pt refused.    Reason for Disposition   Pain also present in shoulder(s) or arm(s) or jaw    Additional Information   Negative: Severe difficulty breathing (e.g., struggling for each breath, speaks in single words)   Negative: Passed out (i.e., fainted, collapsed and was not responding)   Negative: Chest pain lasting longer than 5 minutes and ANY of the following:* Over 50 years old* Over 30 years old and at least one cardiac risk factor (i.e., high blood pressure, diabetes, high cholesterol, obesity, smoker or strong family history of heart disease)* Pain is crushing, pressure-like, or heavy * Took nitroglycerin and chest pain was not relieved* History of heart disease (i.e., angina, heart attack, bypass surgery, angioplasty, CHF)   Negative: Visible sweat on face or sweat dripping down face   Negative: Sounds like a life-threatening emergency to the triager   Negative: Followed an injury to chest   Negative: SEVERE chest pain    Protocols used: CHEST PAIN-A-OH

## 2020-05-12 NOTE — TELEPHONE ENCOUNTER
----- Message from Marry Wang sent at 5/12/2020  1:35 PM CDT -----  Contact: KAYLEN @ [facility]  CALLING TO GET AN E-MAIL OR E-SCRIBE RX RANEXA FOR PATIENT. STATES PATIENT IS HANDICAPPED. PLEASE CALL PHARMACY AND CALL PATIENT WHEN SENT.       [facility]/pharmacy #[id number] - [place] - [address] AT [facility]  [address] [address]  [place] [address]  Phone: [phone] Fax: [phone]

## 2020-05-12 NOTE — TELEPHONE ENCOUNTER
The patient has been notified of this information and all questions answered.    Add Ranexa 500 mg bid  Go to er if the cp worse

## 2020-06-18 ENCOUNTER — TELEPHONE (OUTPATIENT)
Dept: CARDIOLOGY | Facility: CLINIC | Age: 55
End: 2020-06-18

## 2020-06-18 NOTE — TELEPHONE ENCOUNTER
Patient contacted and was asked to reschedule her appointment due to transportation issues. The appointment has been set for 06/25/2020. The patient has confirmed the appointment.      ----- Message from Rose Jimenez sent at 6/18/2020 12:11 PM CDT -----  Regarding: appt  Contact: pt  The pt request a call to rescheduled today's cancelled appt, the pt can be reached at [phone]

## 2020-06-25 DIAGNOSIS — I10 ESSENTIAL HYPERTENSION: Primary | ICD-10-CM

## 2020-07-06 ENCOUNTER — EXTERNAL HOME HEALTH (OUTPATIENT)
Dept: HOME HEALTH SERVICES | Facility: HOSPITAL | Age: 55
End: 2020-07-06
Payer: MEDICAID

## 2020-07-08 DIAGNOSIS — I10 ESSENTIAL HYPERTENSION: ICD-10-CM

## 2020-07-08 DIAGNOSIS — O22.30 DVT (DEEP VEIN THROMBOSIS) IN PREGNANCY: Primary | ICD-10-CM

## 2020-07-08 DIAGNOSIS — I25.118 CORONARY ARTERY DISEASE OF NATIVE ARTERY OF NATIVE HEART WITH STABLE ANGINA PECTORIS: ICD-10-CM

## 2020-07-08 RX ORDER — FUROSEMIDE 40 MG/1
40 TABLET ORAL DAILY
Qty: 30 TABLET | Refills: 5 | Status: SHIPPED | OUTPATIENT
Start: 2020-07-08 | End: 2021-02-15

## 2020-07-08 RX ORDER — ASPIRIN 81 MG/1
81 TABLET ORAL DAILY
Qty: 30 TABLET | Refills: 6 | Status: SHIPPED | OUTPATIENT
Start: 2020-07-08

## 2020-07-08 NOTE — TELEPHONE ENCOUNTER
Needs Lasix and aspirin refilled       ----- Message from Lucy Dill sent at 7/8/2020  9:44 AM CDT -----  Contact: self/[phone]  Would like to consult with nurse regarding refill on medication. Please call patient back at [phone]. Thanks/ar

## 2020-07-09 ENCOUNTER — DOCUMENT SCAN (OUTPATIENT)
Dept: HOME HEALTH SERVICES | Facility: HOSPITAL | Age: 55
End: 2020-07-09
Payer: MEDICAID

## 2020-08-06 ENCOUNTER — TELEPHONE (OUTPATIENT)
Dept: CARDIOLOGY | Facility: CLINIC | Age: 55
End: 2020-08-06

## 2020-08-06 NOTE — TELEPHONE ENCOUNTER
Pt contacted [facility] for pt to call back to reschedule appt.              ----- Message from Anca Weaver sent at 8/6/2020 11:22 AM CDT -----  Type:  Needs Medical Advice    Who Called:  Pt Tri   Symptoms (please be specific):  Pt states she has an appt scheduled for this afternoon/8/6/20 but is needing to reschedule//she had broken her foot in May and the cast was put on too tight which caused a blister//she is having a lot of pain//she is wanting to reschedule for the week of 8-16-20//pt has Medicaid     How long has patient had these symptoms:     Pharmacy name and phone #:   Would the patient rather a call back or a response via [facility]?   Call back   Best Call Back Number:    [phone]  Additional Information:  please call//thanks/carly

## 2020-08-11 ENCOUNTER — TELEPHONE (OUTPATIENT)
Dept: CARDIOLOGY | Facility: CLINIC | Age: 55
End: 2020-08-11

## 2020-08-11 NOTE — TELEPHONE ENCOUNTER
Called patient, regarding messages she left. Patient needs follow up with Dr. Weldon on a Monday to discuss her medications. Scheduled for 8/24/20 at [facility] at 1:40. Patient agreed to date, time, and location.    ----- Message from Shireen Bryan sent at 8/11/2020  9:35 AM CDT -----  Please call pt @ [phone] , pt have questions for nurse.

## 2020-08-24 ENCOUNTER — TELEPHONE (OUTPATIENT)
Dept: CARDIOLOGY | Facility: CLINIC | Age: 55
End: 2020-08-24

## 2020-08-24 DIAGNOSIS — I31.39 PERICARDIAL EFFUSION: Primary | ICD-10-CM

## 2020-08-24 NOTE — TELEPHONE ENCOUNTER
Patient's mother was contacted and rescheduled an appointment for September. The patient is in rehab facility at the moment      ----- Message from Janina Thorne sent at 8/24/2020  9:41 AM CDT -----  Regarding: Appt  Contact: mother, Ms fonseca-[phone]  Would like to consult with the nurse, Patient is in the hospital, mother would like to get another Appt schedule for the patient, please call back thanks sj

## 2020-10-01 DIAGNOSIS — I50.32 CHRONIC DIASTOLIC CONGESTIVE HEART FAILURE: ICD-10-CM

## 2020-10-01 DIAGNOSIS — I25.118 CORONARY ARTERY DISEASE OF NATIVE ARTERY OF NATIVE HEART WITH STABLE ANGINA PECTORIS: ICD-10-CM

## 2020-10-01 DIAGNOSIS — I10 ESSENTIAL HYPERTENSION: Primary | ICD-10-CM

## 2020-10-01 DIAGNOSIS — E78.2 MIXED HYPERLIPIDEMIA: ICD-10-CM

## 2020-10-01 NOTE — TELEPHONE ENCOUNTER
Patient wanted a refill on Ranexa. The patient was advised that the the request has been sent to the provider for approval. The patient stated understanding with no questions or concerns.      ----- Message from Tess Randall sent at 10/1/2020 12:10 PM CDT -----  Regarding: Advice  Contact: Patient  Please give patient a call back at  [phone] (home), she has questions for the nurse.

## 2020-10-02 RX ORDER — RANOLAZINE 500 MG/1
500 TABLET, EXTENDED RELEASE ORAL 2 TIMES DAILY
Qty: 60 TABLET | Refills: 11 | Status: SHIPPED | OUTPATIENT
Start: 2020-10-02 | End: 2022-03-07 | Stop reason: SDUPTHER

## 2021-01-21 ENCOUNTER — TELEPHONE (OUTPATIENT)
Dept: CARDIOLOGY | Facility: CLINIC | Age: 56
End: 2021-01-21

## 2021-02-11 ENCOUNTER — TELEPHONE (OUTPATIENT)
Dept: CARDIOLOGY | Facility: CLINIC | Age: 56
End: 2021-02-11

## 2021-04-29 ENCOUNTER — PATIENT MESSAGE (OUTPATIENT)
Dept: RESEARCH | Facility: HOSPITAL | Age: 56
End: 2021-04-29

## 2021-05-10 ENCOUNTER — TELEPHONE (OUTPATIENT)
Dept: CARDIOLOGY | Facility: CLINIC | Age: 56
End: 2021-05-10

## 2021-06-21 ENCOUNTER — TELEPHONE (OUTPATIENT)
Dept: CARDIOLOGY | Facility: CLINIC | Age: 56
End: 2021-06-21

## 2021-06-22 ENCOUNTER — TELEPHONE (OUTPATIENT)
Dept: CARDIOLOGY | Facility: CLINIC | Age: 56
End: 2021-06-22

## 2021-07-01 ENCOUNTER — PATIENT MESSAGE (OUTPATIENT)
Dept: ADMINISTRATIVE | Facility: OTHER | Age: 56
End: 2021-07-01

## 2021-07-15 DIAGNOSIS — I10 ESSENTIAL HYPERTENSION: Primary | ICD-10-CM

## 2021-07-24 NOTE — ED PROVIDER NOTES
SCRIBE #1 NOTE: IElif, am scribing for, and in the presence of,  Beena Veras Do, MD. I have scribed the following portions of the note - Other sections scribed: EKG, parts of ED Discussion.   SCRIBE #2 NOTE: ILibertad, am scribing for, and in the presence of,  Sagar Lopez MD. I have scribed the remaining portions of the note not scribed by Scribe #1.     Encounter Date: 2018  History     Chief Complaint   Patient presents with    Weakness     x 3days, decreased appetite      Pt here for weakness and back pain.  She had a stroke in the past and uses a walker and wheelchair.  She reports she pulled her back a few days ago when she went from  The walker to her bed and now has bilateral lower back pain and spasm.  It has made her weak and today she slide off her wheelchair to the ground.  No fever, chills, nausea or vomiting, no cp or sob or abd pain.  Just back pain, NO numbness or paresthesias and no incontinence.  Patient also reports that she has been dealing with a cough.  She states that she was admitted few weeks ago and diagnosed with bronchitis.  She states she was not discharged home on any antibiotics, but chart review does show that she was prescribed levofloxacin.  Patient states that she has never fully recovered from her bronchitis and is still coughing regularly.        Review of patient's allergies indicates:  No Known Allergies  Past Medical History:   Diagnosis Date    Coronary artery disease     Coronary artery disease of native artery of native heart with stable angina pectoris 2018    Hypercholesteremia     Hypertension     Neuropathy     Stroke      Past Surgical History:   Procedure Laterality Date     SECTION       Family History   Problem Relation Age of Onset    Hypertension Mother      Social History     Tobacco Use    Smoking status: Former Smoker    Smokeless tobacco: Never Used   Substance Use Topics    Alcohol use: No     Frequency: Never     Drug use: No     Review of Systems   Constitutional: Positive for fever (in ED). Negative for chills.   HENT: Negative for ear pain, sinus pain and sore throat.    Eyes: Negative for pain.   Respiratory: Positive for cough. Negative for chest tightness, shortness of breath, wheezing and stridor.    Cardiovascular: Negative for chest pain and palpitations.   Gastrointestinal: Negative for abdominal pain, diarrhea, nausea and vomiting.   Genitourinary: Negative for dysuria, hematuria and pelvic pain.   Musculoskeletal: Positive for back pain and myalgias. Negative for joint swelling.   Skin: Negative for color change and rash.   Neurological: Positive for weakness and light-headedness. Negative for syncope, numbness and headaches.   Hematological: Does not bruise/bleed easily.   Psychiatric/Behavioral: Negative for agitation.       Physical Exam     Initial Vitals [11/23/18 1752]   BP Pulse Resp Temp SpO2   100/60 110 18 (!) 101.3 °F (38.5 °C) 100 %      MAP       --         Physical Exam  Vital signs and nursing notes reviewed.  Constitutional: Patient is in no acute distress. Awake and alert. Well-developed and well-nourished.  Head: Atraumatic. Normocephalic.  Eyes: PERRL. EOM intact. Conjunctivae nl. No scleral icterus.  ENT: Mucous membranes are moist. Oropharynx is clear.  Neck: Supple. Full ROM. No C spine TTP.  Cardiovascular: Regular rate and rhythm. No murmurs, rubs, or gallops. Distal pulses are 2+ and symmetric .  Pulmonary/Chest: No respiratory distress. Clear to auscultation bilaterally. No wheezing, rales, or rhonchi.  Abdominal: Soft. Non-distended. No TTP. No rebound, guarding, or rigidity. Good bowel sounds.  Back: Lumbar muscle spasms and tenderness. No midline bony tenderness, deformities, or step-offs of the T-spine or L-spine. Skin appears normal without abrasions or bruising. No erythema, induration, or fluctuance.   Genitourinary: No CVA tenderness  Musculoskeletal: Moves all extremities.  No edema. No calf tenderness.  Skin: Very warm to touch.  Neurological: Awake and alert. Able to lift the RLE 45 degrees. Lifts the LLE 30 degrees. No acute focal neurological deficits are appreciated.  Psychiatric: Normal affect. Good eye contact. Appropriate in content.    ED Course   Critical Care  Date/Time: 11/24/2018 5:38 AM  Performed by: Sgaar Lopez MD  Authorized by: Sagar Lopez MD   Direct patient critical care time: 15 minutes  Additional history critical care time: 5 minutes  Ordering / reviewing critical care time: 10 minutes  Documentation critical care time: 5 minutes  Consulting other physicians critical care time: 5 minutes  Total critical care time (exclusive of procedural time) : 40 minutes  Critical care time was exclusive of separately billable procedures and treating other patients and teaching time.  Critical care was necessary to treat or prevent imminent or life-threatening deterioration of the following conditions: dehydration and sepsis.  Critical care was time spent personally by me on the following activities: blood draw for specimens, development of treatment plan with patient or surrogate, discussions with consultants, interpretation of cardiac output measurements, evaluation of patient's response to treatment, examination of patient, obtaining history from patient or surrogate, ordering and performing treatments and interventions, ordering and review of laboratory studies, ordering and review of radiographic studies, pulse oximetry, re-evaluation of patient's condition and review of old charts.           Abnormal labs:  Labs Reviewed   CBC W/ AUTO DIFFERENTIAL - Abnormal; Notable for the following components:       Result Value    RBC 3.68 (*)     Hemoglobin 11.2 (*)     Hematocrit 33.1 (*)     RDW 15.1 (*)     All other components within normal limits   BASIC METABOLIC PANEL - Abnormal; Notable for the following components:    Sodium 135 (*)     CO2 21 (*)     eGFR if   American 49 (*)     eGFR if non  42 (*)     All other components within normal limits   PROCALCITONIN - Abnormal; Notable for the following components:    Procalcitonin 0.46 (*)     All other components within normal limits   URINALYSIS, REFLEX TO URINE CULTURE - Abnormal; Notable for the following components:    Appearance, UA Hazy (*)     Protein, UA Trace (*)     Ketones, UA Trace (*)     Occult Blood UA Trace (*)     Leukocytes, UA Trace (*)     All other components within normal limits    Narrative:     Preferred Collection Type->Urine, Catheterized   TROPONIN I - Abnormal; Notable for the following components:    Troponin I 0.060 (*)     All other components within normal limits   INFLUENZA A & B BY MOLECULAR   CULTURE, BLOOD   CULTURE, BLOOD   LACTIC ACID, PLASMA   TROPONIN I   URINALYSIS MICROSCOPIC    Narrative:     Preferred Collection Type->Urine, Catheterized       Lab Results:  Labs Reviewed   CBC W/ AUTO DIFFERENTIAL - Abnormal; Notable for the following components:       Result Value    RBC 3.68 (*)     Hemoglobin 11.2 (*)     Hematocrit 33.1 (*)     RDW 15.1 (*)     All other components within normal limits   BASIC METABOLIC PANEL - Abnormal; Notable for the following components:    Sodium 135 (*)     CO2 21 (*)     eGFR if  49 (*)     eGFR if non  42 (*)     All other components within normal limits   PROCALCITONIN - Abnormal; Notable for the following components:    Procalcitonin 0.46 (*)     All other components within normal limits   URINALYSIS, REFLEX TO URINE CULTURE - Abnormal; Notable for the following components:    Appearance, UA Hazy (*)     Protein, UA Trace (*)     Ketones, UA Trace (*)     Occult Blood UA Trace (*)     Leukocytes, UA Trace (*)     All other components within normal limits    Narrative:     Preferred Collection Type->Urine, Catheterized   TROPONIN I - Abnormal; Notable for the following components:    Troponin I 0.060 (*)      All other components within normal limits   INFLUENZA A & B BY MOLECULAR   CULTURE, BLOOD   CULTURE, BLOOD   LACTIC ACID, PLASMA   TROPONIN I   URINALYSIS MICROSCOPIC    Narrative:     Preferred Collection Type->Urine, Catheterized        Imaging:  Imaging Results          CT Chest With Contrast (In process)                X-Ray Chest AP Portable (Final result)  Result time 11/23/18 19:19:52    Final result by Naveen Sepulveda III, MD (11/23/18 19:19:52)                 Impression:      Cardiomegaly and mild vascular congestion without evidence of pulmonary edema or other acute disease.      Electronically signed by: Naveen Sepulveda MD  Date:    11/23/2018  Time:    19:19             Narrative:    EXAMINATION:  XR CHEST AP PORTABLE    CLINICAL HISTORY:  cough;    COMPARISON:  11/03/2018    FINDINGS:  There is cardiomegaly and mild prominence of the pulmonary vasculature without pulmonary edema identified.  The lungs appear clear of active disease. No acute appearing infiltrate, pleural effusion or pneumothorax identified.                               X-Ray Lumbar Spine Ap And Lateral (Final result)  Result time 11/23/18 19:18:46    Final result by Naveen Sepuvleda III, MD (11/23/18 19:18:46)                 Impression:      No acute abnormality identified in the lumbar spine.      Electronically signed by: Naveen Sepulveda MD  Date:    11/23/2018  Time:    19:18             Narrative:    EXAMINATION:  XR LUMBAR SPINE AP AND LATERAL    CLINICAL HISTORY:  T/L-spine trauma, minor-mod, low back pain;Unspecified fall, initial encounter    TECHNIQUE:  AP, lateral and spot images were performed of the lumbar spine.    COMPARISON:  None    FINDINGS:  There is no evidence of fracture. Vertebral body alignment is within normal limits.  There are disc type changes with thick bridging syndesmophytes in the thoracic spine and small anterior syndesmophytes in the lumbar spine.  There are small anterior endplate osteophytes  scattered within the lumbar spine without significant disc height loss.  The visualized sacrum and sacroiliac joints appear intact.                              Per STAT radiology, pt's CT results no substantial change from prior study. Moderate amount of pericardial effusion, measuring 2.2 cm in maximal thickness posteriorly. Cardiac tamponade should be considered. Please correlate with clinical and EKG findings.    Echo pending at time of admission. Do not have high suspicion of tamponade. Normotensive at the moment. Resting in NAD    The EKG was ordered, reviewed, and independently interpreted by the ED provider.  Interpretation time: 19:12  Rate: 101 BPM  Rhythm: sinus tachycardia  Interpretation: Possible left atrial enlargement. RBBB. Anterolateral infarct. No STEMI.    ED Discussion:      8:00 PM: Dr. Gatica transfers care of pt to Dr. Lopez, pending lab results.    10:00 AM: Dr. Lopez evaluated pt. Pt is resting comfortably and is in no acute distress. Answered all questions.    1:26 AM: CT scan shows moderate amount of pericardial effusion. Suggest echo to r/o tamponade. Will order echo.     6:00 AM: Discussed case with Dr. Almanza ([facility]). Dr. Almanza agrees with current care and management of pt and accepts admission.   Admitting Service: Hospital medicine   Admitting Physician: Dr. Almanza  Admit to: Inpatient Tele    6:03 AM: Re-evaluated pt. I have discussed test results, shared treatment plan, and the need for admission with patient and family at bedside. Pt and family express understanding at this time and agree with all information. All questions answered. Pt and family have no further questions or concerns at this time. Pt is ready for admit.         Medical Decision Making:   Clinical Tests:   Lab Tests: Ordered and Reviewed  Radiological Study: Ordered and Reviewed  Medical Tests: Ordered and Reviewed  Patient presented with SIRS criteria with fever and tachycardia; blood pressure borderline;  patient given IV fluids and broad-spectrum antibiotics; no definitive source identified yet in the emergency department; CT scan did note pericardial effusion; stat echo was ordered to rule out tamponade as the cause for her borderline blood pressure; Medicine consulted; Medicine to follow up echocardiogram results; the time of admission, patient's blood pressure had stabilized with fluids            Scribe Attestation:   Scribe #1: I performed the above scribed service and the documentation accurately describes the services I performed. I attest to the accuracy of the note.    Attending Attestation:           Physician Attestation for Scribe:  Physician Attestation Statement for Scribe #1: I, Beena Veras Do, MD, reviewed documentation, as scribed by Elif Banegas in my presence, and it is both accurate and complete.   Physician Attestation Statement for Scribe #2: I, Sagar Lopez MD, reviewed documentation, as scribed by Libertad Holly in my presence, and it is both accurate and complete. I also acknowledge and confirm the content of the note done by Scribe #1.                 Clinical Impression:     1. Generalized weakness  2. Sepsis  3. Pericardial Effusion  4. Cough    Disposition:   Disposition: Admitted  Condition: Fair                        Sagar Lopez MD  11/24/18 2015     swelling of lower extremities

## 2021-10-01 RX ORDER — METOPROLOL SUCCINATE 25 MG/1
25 TABLET, EXTENDED RELEASE ORAL 2 TIMES DAILY
Qty: 60 TABLET | Refills: 2 | Status: SHIPPED | OUTPATIENT
Start: 2021-10-01 | End: 2021-10-04 | Stop reason: SDUPTHER

## 2021-10-05 RX ORDER — METOPROLOL SUCCINATE 25 MG/1
25 TABLET, EXTENDED RELEASE ORAL 2 TIMES DAILY
Qty: 60 TABLET | Refills: 2 | Status: SHIPPED | OUTPATIENT
Start: 2021-10-05 | End: 2022-02-17

## 2022-01-12 NOTE — TELEPHONE ENCOUNTER
Refill request for Hydralazine 25mg TID sent for approval      ----- Message from Kym Lazar sent at 1/12/2022  1:55 PM CST -----  Pt is calling in regards to the pharmacy is not able to fill pt's medication without  speaking to nurse first. Please call her back at [phone].          Thanks  DD

## 2022-01-13 RX ORDER — HYDRALAZINE HYDROCHLORIDE 25 MG/1
25 TABLET, FILM COATED ORAL EVERY 8 HOURS
Qty: 90 TABLET | Refills: 3 | Status: SHIPPED | OUTPATIENT
Start: 2022-01-13 | End: 2022-03-01

## 2022-02-14 ENCOUNTER — TELEPHONE (OUTPATIENT)
Dept: CARDIOLOGY | Facility: CLINIC | Age: 57
End: 2022-02-14
Payer: MEDICAID

## 2022-02-14 NOTE — TELEPHONE ENCOUNTER
Patient contacted and was advised on the voice mail to return the call to the clinic to get refills.    ----- Message from Shireen Bryan sent at 2/14/2022  2:47 PM CST -----  Type:  RX Refill Request    Who Called: patient  Refill or New Rx:refill  RX Name and Strength:Metoprolol   How is the patient currently taking it? (ex. 1XDay):1xday  Is this a 30 day or 90 day RX:90day  Preferred Pharmacy with phone number:-[phone]  Local or Mail Order:local  Ordering Provider:Dr Weldon  Would the patient rather a call back or a response via [facility]? Call back  Best Call Back Number [phone]  Additional Information: pt is out of meds

## 2022-03-01 ENCOUNTER — HOSPITAL ENCOUNTER (OUTPATIENT)
Dept: CARDIOLOGY | Facility: HOSPITAL | Age: 57
Discharge: HOME | End: 2022-03-01
Attending: INTERNAL MEDICINE
Payer: MEDICAID

## 2022-03-01 ENCOUNTER — OFFICE VISIT (OUTPATIENT)
Dept: CARDIOLOGY | Facility: CLINIC | Age: 57
End: 2022-03-01
Payer: MEDICAID

## 2022-03-01 VITALS
HEIGHT: 63 IN | DIASTOLIC BLOOD PRESSURE: 86 MMHG | BODY MASS INDEX: 51.91 KG/M2 | SYSTOLIC BLOOD PRESSURE: 104 MMHG | WEIGHT: 293 LBS | RESPIRATION RATE: 16 BRPM

## 2022-03-01 DIAGNOSIS — I31.39 PERICARDIAL EFFUSION: ICD-10-CM

## 2022-03-01 DIAGNOSIS — E11.9 CONTROLLED TYPE 2 DIABETES MELLITUS WITHOUT COMPLICATION, WITHOUT LONG-TERM CURRENT USE OF INSULIN: ICD-10-CM

## 2022-03-01 DIAGNOSIS — O22.30 DVT (DEEP VEIN THROMBOSIS) IN PREGNANCY: Primary | ICD-10-CM

## 2022-03-01 DIAGNOSIS — I45.2 BIFASCICULAR BLOCK: ICD-10-CM

## 2022-03-01 DIAGNOSIS — I10 ESSENTIAL HYPERTENSION: ICD-10-CM

## 2022-03-01 DIAGNOSIS — I25.118 CORONARY ARTERY DISEASE OF NATIVE ARTERY OF NATIVE HEART WITH STABLE ANGINA PECTORIS: ICD-10-CM

## 2022-03-01 DIAGNOSIS — I50.32 CHRONIC DIASTOLIC CONGESTIVE HEART FAILURE: ICD-10-CM

## 2022-03-01 DIAGNOSIS — E78.2 MIXED HYPERLIPIDEMIA: ICD-10-CM

## 2022-03-01 DIAGNOSIS — I63.9 CEREBROVASCULAR ACCIDENT (CVA), UNSPECIFIED MECHANISM: ICD-10-CM

## 2022-03-01 PROCEDURE — 3008F PR BODY MASS INDEX (BMI) DOCUMENTED: ICD-10-PCS | Mod: CPTII,,, | Performed by: INTERNAL MEDICINE

## 2022-03-01 PROCEDURE — 1159F PR MEDICATION LIST DOCUMENTED IN MEDICAL RECORD: ICD-10-PCS | Mod: CPTII,,, | Performed by: INTERNAL MEDICINE

## 2022-03-01 PROCEDURE — 1159F MED LIST DOCD IN RCRD: CPT | Mod: CPTII,,, | Performed by: INTERNAL MEDICINE

## 2022-03-01 PROCEDURE — 99999 PR PBB SHADOW E&M-EST. PATIENT-LVL IV: CPT | Mod: PBBFAC,,, | Performed by: INTERNAL MEDICINE

## 2022-03-01 PROCEDURE — 1160F PR REVIEW ALL MEDS BY PRESCRIBER/CLIN PHARMACIST DOCUMENTED: ICD-10-PCS | Mod: CPTII,,, | Performed by: INTERNAL MEDICINE

## 2022-03-01 PROCEDURE — 99215 PR OFFICE/OUTPT VISIT, EST, LEVL V, 40-54 MIN: ICD-10-PCS | Mod: S$PBB,,, | Performed by: INTERNAL MEDICINE

## 2022-03-01 PROCEDURE — 99214 OFFICE O/P EST MOD 30 MIN: CPT | Mod: PBBFAC | Performed by: INTERNAL MEDICINE

## 2022-03-01 PROCEDURE — 1160F RVW MEDS BY RX/DR IN RCRD: CPT | Mod: CPTII,,, | Performed by: INTERNAL MEDICINE

## 2022-03-01 PROCEDURE — 3074F SYST BP LT 130 MM HG: CPT | Mod: CPTII,,, | Performed by: INTERNAL MEDICINE

## 2022-03-01 PROCEDURE — 3079F PR MOST RECENT DIASTOLIC BLOOD PRESSURE 80-89 MM HG: ICD-10-PCS | Mod: CPTII,,, | Performed by: INTERNAL MEDICINE

## 2022-03-01 PROCEDURE — 93005 ELECTROCARDIOGRAM TRACING: CPT

## 2022-03-01 PROCEDURE — 4010F PR ACE/ARB THEARPY RXD/TAKEN: ICD-10-PCS | Mod: CPTII,,, | Performed by: INTERNAL MEDICINE

## 2022-03-01 PROCEDURE — 99999 PR PBB SHADOW E&M-EST. PATIENT-LVL IV: ICD-10-PCS | Mod: PBBFAC,,, | Performed by: INTERNAL MEDICINE

## 2022-03-01 PROCEDURE — 3079F DIAST BP 80-89 MM HG: CPT | Mod: CPTII,,, | Performed by: INTERNAL MEDICINE

## 2022-03-01 PROCEDURE — 4010F ACE/ARB THERAPY RXD/TAKEN: CPT | Mod: CPTII,,, | Performed by: INTERNAL MEDICINE

## 2022-03-01 PROCEDURE — 93010 ELECTROCARDIOGRAM REPORT: CPT | Mod: ,,, | Performed by: INTERNAL MEDICINE

## 2022-03-01 PROCEDURE — 3074F PR MOST RECENT SYSTOLIC BLOOD PRESSURE < 130 MM HG: ICD-10-PCS | Mod: CPTII,,, | Performed by: INTERNAL MEDICINE

## 2022-03-01 PROCEDURE — 93010 EKG 12-LEAD: ICD-10-PCS | Mod: ,,, | Performed by: INTERNAL MEDICINE

## 2022-03-01 PROCEDURE — 99215 OFFICE O/P EST HI 40 MIN: CPT | Mod: S$PBB,,, | Performed by: INTERNAL MEDICINE

## 2022-03-01 PROCEDURE — 3008F BODY MASS INDEX DOCD: CPT | Mod: CPTII,,, | Performed by: INTERNAL MEDICINE

## 2022-03-01 RX ORDER — AMLODIPINE BESYLATE 5 MG/1
5 TABLET ORAL DAILY
Qty: 30 TABLET | Refills: 11 | Status: SHIPPED | OUTPATIENT
Start: 2022-03-01 | End: 2023-02-28

## 2022-03-01 NOTE — PROGRESS NOTES
Subjective:   Patient ID:  Tri Staton is a 56 y.o. female who presents for follow up of No chief complaint on file.      57 yo female, f/u for severe multi-vessel CAD. Wheelchair bound after CVA.  PMH recent Dx of CAD and CVA with residual left side weakness in , h/o DVT, HTN, MI, hypercholesteremia, and neuropathy. H/o drug abuse. HALIE CPAP Rx pending  Severe multivessel Dz. Not candidate for CABG or PCI. Continue on medical Rx  , had chest pain, dyspnea and could not move. Went to [facility]. Dx of CVA. D/c'ed. Few days later, felt worse and fever. Went to hospital again. Had cath done and CABG was advised. Pt decided to go back to  for medical care.   Evaluated by Dr. Steele CVT at [facility] in  and due to diffuse coronary disease and high risk, she is not candidate for CABG. Subsequently she had cardiac evaluation at [facility] cardiology and thought was not candidate for either PCI or CABG. Also went through cath imaging with Dr. Torres and no PCI indicated.   Went to ER [facility] on 09/16 for LLE swelling and pain.  US revealed positive DVT and started eliquis. Chest CTA moderate pericardial effusion and no PE. D/c'ed from ER.  10/04, c/o chest pain and weakness. With hemarroids. Sent to ER and troponin < 0.006 and EKG did not show acute stt change. BNP normal  : ECHo showed normal EF and moderate pericardial effusion. No tamponade.   BNP 17 in   Quit smoking 3 months, 1 cigar a day.x 10 yrs  Quit drinking 6 months, 1 pint of liquor a day x10 yrs.  Smoked marijuana  Smoked cocaines one yr ago for 10 yrs, remote IV cocaine 25 yrs ago.    C/o SOB and leg swelling recently. Sleeps with two pillows. No PND, palpitation and dizziness.   Limited walking due to back pain. No orthopnea.   No chest pain,     Interval history  Last seen was in .  S/p right carotid CEA in  at [facility] by Dr. Shay. Alsohas large thyroid  Weight gain. No walking  and bed bound. Episode of syncope in doc's office and question of CVA. F/u neurologist at [facility]  No chest pain, dyspnea orthopnea.   Occasional dizziness and anxiety  EKG today NSR and RBBB LAFB   echo at [facility] normal EF and no pericardial effusion  Her DVT occurred after CVA  LDL 67 in               Past Medical History:   Diagnosis Date    Coronary artery disease     Coronary artery disease of native artery of native heart with stable angina pectoris 2018    Hypercholesteremia     Hypertension     Neuropathy     Stroke        Past Surgical History:   Procedure Laterality Date     SECTION         Social History     Tobacco Use    Smoking status: Former Smoker    Smokeless tobacco: Never Used   Substance Use Topics    Alcohol use: No    Drug use: No       Family History   Problem Relation Age of Onset    Hypertension Mother          Review of Systems   Constitutional: Positive for weight gain. Negative for decreased appetite, diaphoresis, fever, malaise/fatigue and night sweats.   HENT: Negative for nosebleeds.    Eyes: Negative for blurred vision and double vision.   Cardiovascular: Positive for leg swelling. Negative for chest pain, claudication, irregular heartbeat, near-syncope, orthopnea, palpitations, paroxysmal nocturnal dyspnea and syncope.   Respiratory: Negative for cough, shortness of breath, sleep disturbances due to breathing, snoring, sputum production and wheezing.    Endocrine: Negative for cold intolerance and polyuria.   Hematologic/Lymphatic: Does not bruise/bleed easily.   Skin: Negative for rash.   Musculoskeletal: Positive for muscle weakness. Negative for back pain, falls, joint pain, joint swelling and neck pain.   Gastrointestinal: Negative for abdominal pain, heartburn, nausea and vomiting.   Genitourinary: Negative for dysuria, frequency and hematuria.   Neurological: Positive for loss of balance. Negative for difficulty with concentration, dizziness,  focal weakness, headaches, light-headedness, numbness, seizures and weakness.   Psychiatric/Behavioral: Negative for depression, memory loss and substance abuse. The patient does not have insomnia.    Allergic/Immunologic: Negative for HIV exposure and hives.       Objective:   Physical Exam  HENT:      Head: Normocephalic.   Eyes:      Pupils: Pupils are equal, round, and reactive to light.   Neck:      Thyroid: No thyromegaly.      Vascular: Normal carotid pulses. No carotid bruit or JVD.   Cardiovascular:      Rate and Rhythm: Normal rate and regular rhythm.  No extrasystoles are present.     Chest Wall: PMI is not displaced.      Pulses: Normal pulses.      Heart sounds: Normal heart sounds. No murmur heard.    No gallop. No S3 sounds.   Pulmonary:      Effort: No respiratory distress.      Breath sounds: Normal breath sounds. No stridor.   Abdominal:      General: Bowel sounds are normal.      Palpations: Abdomen is soft.      Tenderness: There is no abdominal tenderness. There is no rebound.   Musculoskeletal:      Comments: 1+ edema on LLE   Skin:     Findings: No rash.   Neurological:      Mental Status: She is alert and oriented to person, place, and time.   Psychiatric:         Behavior: Behavior normal.         Lab Results   Component Value Date    CHOL 80 (L) 11/24/2018     Lab Results   Component Value Date    HDL 22 (L) 11/24/2018     Lab Results   Component Value Date    LDLCALC 42.8 (L) 11/24/2018     Lab Results   Component Value Date    TRIG 76 11/24/2018     Lab Results   Component Value Date    CHOLHDL 27.5 11/24/2018       Chemistry        Component Value Date/Time     09/16/2019 1447    K 4.9 09/16/2019 1447     09/16/2019 1447    CO2 23 09/16/2019 1447    BUN 22 (H) 09/16/2019 1447    CREATININE 1.3 09/16/2019 1447    GLU 92 09/16/2019 1447        Component Value Date/Time    CALCIUM 9.3 09/16/2019 1447    ALKPHOS 73 12/12/2018 2200    AST 78 (H) 12/12/2018 2200    ALT 51 (H)  12/12/2018 2200    BILITOT 0.4 12/12/2018 2200    ESTGFRAFRICA 53.0 (A) 09/16/2019 1447    EGFRNONAA 46.0 (A) 09/16/2019 1447          Lab Results   Component Value Date    HGBA1C 14.4 (H) 01/24/2022     Lab Results   Component Value Date    TSH 0.605 11/24/2018     Lab Results   Component Value Date    INR 1.2 11/03/2018    INR 1.2 10/04/2018    INR 1.2 09/27/2018     Lab Results   Component Value Date    WBC 3.68 (L) 12/12/2018    HGB 12.1 12/12/2018    HCT 37.6 12/12/2018    MCV 92 12/12/2018     12/12/2018     BMP  Sodium   Date Value Ref Range Status   09/16/2019 139 136 - 145 mmol/L Final     Potassium   Date Value Ref Range Status   09/16/2019 4.9 3.5 - 5.1 mmol/L Final     Chloride   Date Value Ref Range Status   09/16/2019 105 95 - 110 mmol/L Final     CO2   Date Value Ref Range Status   09/16/2019 23 23 - 29 mmol/L Final     BUN   Date Value Ref Range Status   09/16/2019 22 (H) 6 - 20 mg/dL Final     Creatinine   Date Value Ref Range Status   09/16/2019 1.3 0.5 - 1.4 mg/dL Final     Calcium   Date Value Ref Range Status   09/16/2019 9.3 8.7 - 10.5 mg/dL Final     Anion Gap   Date Value Ref Range Status   09/16/2019 11 8 - 16 mmol/L Final     eGFR if    Date Value Ref Range Status   09/16/2019 53.0 (A) >60 mL/min/1.73 m^2 Final     eGFR if non    Date Value Ref Range Status   09/16/2019 46.0 (A) >60 mL/min/1.73 m^2 Final     Comment:     Calculation used to obtain the estimated glomerular filtration  rate (eGFR) is the CKD-EPI equation.        BNP  @LABRCNTIP(BNP,BNPTRIAGEBLO)@  @LABRCNTIP(troponini)@  CrCl cannot be calculated (Patient's most recent lab result is older than the maximum 7 days allowed.).  No results found in the last 24 hours.  No results found in the last 24 hours.  No results found in the last 24 hours.    Assessment:      1. DVT (deep vein thrombosis) in pregnancy    2. Chronic diastolic congestive heart failure    3. Coronary artery disease of  native artery of native heart with stable angina pectoris    4. Essential hypertension    5. Mixed hyperlipidemia    6. Pericardial effusion    7. Controlled type 2 diabetes mellitus without complication, without long-term current use of insulin    8. Bifascicular block    9. Cerebrovascular accident (CVA), unspecified mechanism      Severe CAD not candidate for revascularization  H/o CVA can not walk  DV after the stroke  Recent right carotid CEA  Low BP   Plan:   D/c hydralazine   Decrease Lisinopril from 20 mg in am and 40 mg in PM to 20 mg daily  Add amlodipine 5 mg daily for HTN and CAD  Continue toprolXL 25 mg bid  COntiune ASA Eliquis 5mg bid life long for DVT and Lipitor  DM Rx per PCP  Counseled DASH  Check Lipid profile in 6 months  Recommend heart-healthy diet, weight control   Sukhi. Risk modification.   I have reviewed all pertinent labs and cardiac studies independently. Plans and recommendations have been formulated under my direct supervision. All questions answered and patient voiced understanding.   If symptoms persist go to the ED  RTC in 6 months

## 2022-03-07 DIAGNOSIS — E78.2 MIXED HYPERLIPIDEMIA: ICD-10-CM

## 2022-03-07 DIAGNOSIS — I50.32 CHRONIC DIASTOLIC CONGESTIVE HEART FAILURE: ICD-10-CM

## 2022-03-07 DIAGNOSIS — I25.118 CORONARY ARTERY DISEASE OF NATIVE ARTERY OF NATIVE HEART WITH STABLE ANGINA PECTORIS: ICD-10-CM

## 2022-03-07 DIAGNOSIS — I10 ESSENTIAL HYPERTENSION: ICD-10-CM

## 2022-03-07 RX ORDER — RANOLAZINE 500 MG/1
500 TABLET, EXTENDED RELEASE ORAL 2 TIMES DAILY
Qty: 60 TABLET | Refills: 11 | Status: SHIPPED | OUTPATIENT
Start: 2022-03-07 | End: 2023-06-23

## 2022-03-07 RX ORDER — LISINOPRIL 20 MG/1
20 TABLET ORAL DAILY
Qty: 30 TABLET | Refills: 11 | Status: SHIPPED | OUTPATIENT
Start: 2022-03-07 | End: 2023-02-28

## 2022-04-13 NOTE — PT/OT/SLP EVAL
Physical Therapy Evaluation    Patient Name:  Tri Staton   MRN:  [id number]    Recommendations:     Discharge Recommendations:  home with home health   Discharge Equipment Recommendations: none   Barriers to discharge: none    Assessment:     Tri Staton is a 53 y.o. female admitted with a medical diagnosis of SIRS (systemic inflammatory response syndrome).  She presents with the following impairments/functional limitations:  weakness, impaired endurance, impaired functional mobilty, decreased coordination, impaired balance, gait instability, decreased lower extremity function, decreased upper extremity function, decreased safety awareness .    Rehab Prognosis:  Good; patient would benefit from acute skilled PT services to address these deficits and reach maximum level of function.      Recent Surgery: * No surgery found *      Plan:     During this hospitalization, patient to be seen 5 x/week(Pt to be seen a minimum of 5 out of 7 days a week for skilled PT) to address the above listed problems via gait training, therapeutic activities, therapeutic exercises  · Plan of Care Expires:  12/03/18   Plan of Care Reviewed with: patient    Subjective     Communicated with Nurse Maloney and Epic chart review prior to session.  Patient found Supine in bed upon PT entry to room, agreeable to evaluation.      Chief Complaint: weakness  Patient comments/goals:  To be as independent as possible  Pain/Comfort:  · Pain Rating 1: 0/10  · Pain Rating Post-Intervention 1: 0/10    Patients cultural, spiritual, Hoahaoism conflicts given the current situation:      Living Environment:  Pt lives with her mother in 1 story house with no steps.  Prior to admission, patients level of function was assistance with ADLs and ambulates short distances with RW and SPC at times.  Patient has the following equipment: walker, rolling, shower chair.  DME owned (not currently used): none.  Upon discharge, patient will have  assistance from mother.    Objective:     Patient found with: telemetry, peripheral IV     General Precautions: Standard, fall   Orthopedic Precautions:N/A   Braces: N/A     Exams:  · RLE ROM: WFL  · RLE Strength: 4/5 grossly  · LLE ROM: WFL  · LLE Strength: 4/5 grossly    Functional Mobility:  · Bed Mobility:     · Rolling Left:  minimum assistance  · Rolling Right: minimum assistance  · Scooting: minimum assistance  · Supine to Sit: moderate assistance  · Transfers:     · Sit to Stand:  minimum assistance with rolling walker  · Bed to Chair: minimum assistance with  rolling walker  using  Step Transfer  · Gait: ~75 ft using RW with Min A  · Balance: Poor+    AM-PAC 6 CLICK MOBILITY  Total Score:16       Therapeutic Activities and Exercises:   Pt educated in role of PT and POC.     Patient left up in chair with all lines intact, call button in reach and Nurse Amara notified.    GOALS:   Multidisciplinary Problems     Physical Therapy Goals        Problem: Physical Therapy Goal    Goal Priority Disciplines Outcome Goal Variances Interventions   Physical Therapy Goal     PT, PT/OT      Description:  LTGs to be met by 12/3/18  1. Pt will perform bed mobility with SBA  2. Pt will perform t/fs with Supervision  3. Pt will ambulate using RW 150ft with SBA                    History:     Past Medical History:   Diagnosis Date    Coronary artery disease     Coronary artery disease of native artery of native heart with stable angina pectoris 2018    Hypercholesteremia     Hypertension     Neuropathy     Stroke        Past Surgical History:   Procedure Laterality Date     SECTION         Clinical Decision Making:     History  Co-morbidities and personal factors that may impact the plan of care Examination  Body Structures and Functions, activity limitations and participation restrictions that may impact the plan of care Clinical Presentation   Decision Making/ Complexity Score   Co-morbidities:   [] Time  since onset of injury / illness / exacerbation  [] Status of current condition  []Patient's cognitive status and safety concerns    [] Multiple Medical Problems (see med hx)  Personal Factors:   [] Patient's age  [] Prior Level of function   [] Patient's home situation (environment and family support)  [] Patient's level of motivation  [] Expected progression of patient      HISTORY:(criteria)    [] 08145 - no personal factors/history    [] 34605 - has 1-2 personal factor/comorbidity     [] 87934 - has >3 personal factor/comorbidity     Body Regions:  [] Objective examination findings  [] Head     []  Neck  [] Trunk   [] Upper Extremity  [] Lower Extremity    Body Systems:  [] For communication ability, affect, cognition, language, and learning style: the assessment of the ability to make needs known, consciousness, orientation (person, place, and time), expected emotional /behavioral responses, and learning preferences (eg, learning barriers, education  needs)  [] For the neuromuscular system: a general assessment of gross coordinated movement (eg, balance, gait, locomotion, transfers, and transitions) and motor function  (motor control and motor learning)  [] For the musculoskeletal system: the assessment of gross symmetry, gross range of motion, gross strength, height, and weight  [] For the integumentary system: the assessment of pliability(texture), presence of scar formation, skin color, and skin integrity  [] For cardiovascular/pulmonary system: the assessment of heart rate, respiratory rate, blood pressure, and edema     Activity limitations:    [] Patient's cognitive status and saf ety concerns          [] Status of current condition      [] Weight bearing restriction  [] Cardiopulmunary Restriction    Participation Restrictions:   [] Goals and goal agreement with the patient     [] Rehab potential (prognosis) and probable outcome      Examination of Body System: (criteria)    [] 22857 - addressing 1-2  elements    [] 89413 - addressing a total of 3 or more elements     [] 92871 -  Addressing a total of 4 or more elements         Clinical Presentation: (criteria)  Choose one     On examination of body system using standardized tests and measures patient presents with (CHOOSE ONE) elements from any of the following: body structures and functions, activity limitations, and/or participation restrictions.  Leading to a clinical presentation that is considered (CHOOSE ONE)                              Clinical Decision Making  (Eval Complexity):  Choose One     Time Tracking:     PT Received On: 11/26/18  PT Start Time: 1015     PT Stop Time: 1045  PT Total Time (min): 30 min     Billable Minutes: Evaluation 15 min and Therapeutic Activity 10 min      Fadia Garcia, PT/OT  11/26/2018   Mohs Rapid Report Verbiage: The area of clinically evident tumor was marked with skin marking ink and appropriately hatched.  The initial incision was made following the Mohs approach through the skin.  The specimen was taken to the lab, divided into the necessary number of pieces, chromacoded and processed according to the Mohs protocol.  This was repeated in successive stages until a tumor free defect was achieved.

## 2022-10-11 DIAGNOSIS — I10 ESSENTIAL HYPERTENSION: Primary | ICD-10-CM

## 2022-10-11 RX ORDER — HYDRALAZINE HYDROCHLORIDE 25 MG/1
25 TABLET, FILM COATED ORAL EVERY 8 HOURS
Qty: 90 TABLET | Refills: 3 | Status: SHIPPED | OUTPATIENT
Start: 2022-10-11 | End: 2023-06-23

## 2022-12-15 ENCOUNTER — TELEPHONE (OUTPATIENT)
Dept: CARDIOLOGY | Facility: CLINIC | Age: 57
End: 2022-12-15
Payer: MEDICAID

## 2022-12-15 NOTE — TELEPHONE ENCOUNTER
Pt contacted [facility] for pt to call back.            ----- Message from Wiley Kruger sent at 12/15/2022  9:29 AM CST -----  Tri is calling in regards to getting her appointment on 12/20 schedule for another day. Please call her back at [phone]      Thanks  CF

## 2023-03-13 ENCOUNTER — TELEPHONE (OUTPATIENT)
Dept: CARDIOLOGY | Facility: CLINIC | Age: 58
End: 2023-03-13
Payer: MEDICAID

## 2023-03-13 NOTE — TELEPHONE ENCOUNTER
Pt contacted [facility] for pt to call back.            ----- Message from Calvin Dale sent at 3/13/2023  8:10 AM CDT -----  Contact: [phone]  Patient requesting a call back to be scheduled. Please call back at [phone]. Thanks KD

## 2023-04-03 ENCOUNTER — TELEPHONE (OUTPATIENT)
Dept: CARDIOLOGY | Facility: CLINIC | Age: 58
End: 2023-04-03
Payer: MEDICAID

## 2023-04-03 ENCOUNTER — TELEPHONE (OUTPATIENT)
Dept: CARDIOLOGY | Facility: HOSPITAL | Age: 58
End: 2023-04-03
Payer: MEDICAID

## 2023-04-03 NOTE — TELEPHONE ENCOUNTER
Patient requesting  a visit. States she has not seen Cardiologist in a while. Denies present concerns.    Patient stated any days are good except 04/12 and 04/17/2023 and [facility] [place] location only.     Likes appts afternoon, but not late in the day.

## 2023-04-03 NOTE — TELEPHONE ENCOUNTER
----- Message from Evelin Santiago sent at 4/3/2023 10:56 AM CDT -----  Contact: Tri Bartlett is needing a call back to get scheduled for an appointment for any day but Thursday in the late afternoon. Please call her at [phone].

## 2023-05-08 DIAGNOSIS — I10 ESSENTIAL HYPERTENSION: Primary | ICD-10-CM

## 2023-05-08 DIAGNOSIS — Z00.00 ROUTINE HEALTH MAINTENANCE: ICD-10-CM

## 2023-05-10 ENCOUNTER — TELEPHONE (OUTPATIENT)
Dept: CARDIOLOGY | Facility: CLINIC | Age: 58
End: 2023-05-10
Payer: MEDICAID

## 2023-05-10 NOTE — TELEPHONE ENCOUNTER
Spoke with pt in regards to getting f/u appt scheduled.              ----- Message from Manish Lopez sent at 5/10/2023 10:24 AM CDT -----  Regarding: pt call bk  Name of Who is Calling:DIMPLE SARAH [[id number]]        What is the request in detail: Pt requesting shanti please contact soon           Can the clinic reply by [facility]: no         What Number [facility] Telephone Information:  Mobile          [phone]

## 2023-06-02 ENCOUNTER — TELEPHONE (OUTPATIENT)
Dept: CARDIOLOGY | Facility: CLINIC | Age: 58
End: 2023-06-02
Payer: MEDICAID

## 2023-06-13 DIAGNOSIS — I10 ESSENTIAL HYPERTENSION: Primary | ICD-10-CM

## 2023-06-13 DIAGNOSIS — I31.39 PERICARDIAL EFFUSION: ICD-10-CM

## 2023-06-23 ENCOUNTER — OFFICE VISIT (OUTPATIENT)
Dept: CARDIOLOGY | Facility: CLINIC | Age: 58
End: 2023-06-23
Payer: MEDICAID

## 2023-06-23 ENCOUNTER — HOSPITAL ENCOUNTER (OUTPATIENT)
Dept: CARDIOLOGY | Facility: HOSPITAL | Age: 58
Discharge: HOME | End: 2023-06-23
Attending: INTERNAL MEDICINE
Payer: MEDICAID

## 2023-06-23 VITALS
SYSTOLIC BLOOD PRESSURE: 105 MMHG | HEART RATE: 105 BPM | WEIGHT: 260 LBS | OXYGEN SATURATION: 90 % | BODY MASS INDEX: 46.06 KG/M2 | DIASTOLIC BLOOD PRESSURE: 75 MMHG

## 2023-06-23 DIAGNOSIS — R00.0 SINUS TACHYCARDIA: ICD-10-CM

## 2023-06-23 DIAGNOSIS — I10 ESSENTIAL HYPERTENSION: ICD-10-CM

## 2023-06-23 DIAGNOSIS — O22.30 DVT (DEEP VEIN THROMBOSIS) IN PREGNANCY: ICD-10-CM

## 2023-06-23 DIAGNOSIS — I10 ESSENTIAL HYPERTENSION: Primary | ICD-10-CM

## 2023-06-23 DIAGNOSIS — I31.39 PERICARDIAL EFFUSION: ICD-10-CM

## 2023-06-23 DIAGNOSIS — I45.2 BIFASCICULAR BLOCK: ICD-10-CM

## 2023-06-23 DIAGNOSIS — I25.118 CORONARY ARTERY DISEASE OF NATIVE ARTERY OF NATIVE HEART WITH STABLE ANGINA PECTORIS: ICD-10-CM

## 2023-06-23 DIAGNOSIS — E78.2 MIXED HYPERLIPIDEMIA: ICD-10-CM

## 2023-06-23 DIAGNOSIS — I50.32 CHRONIC DIASTOLIC CONGESTIVE HEART FAILURE: ICD-10-CM

## 2023-06-23 PROCEDURE — 99214 OFFICE O/P EST MOD 30 MIN: CPT | Mod: PBBFAC | Performed by: INTERNAL MEDICINE

## 2023-06-23 PROCEDURE — 4010F PR ACE/ARB THEARPY RXD/TAKEN: ICD-10-PCS | Mod: CPTII,,, | Performed by: INTERNAL MEDICINE

## 2023-06-23 PROCEDURE — 3074F SYST BP LT 130 MM HG: CPT | Mod: CPTII,,, | Performed by: INTERNAL MEDICINE

## 2023-06-23 PROCEDURE — 93005 ELECTROCARDIOGRAM TRACING: CPT

## 2023-06-23 PROCEDURE — 93010 EKG 12-LEAD: ICD-10-PCS | Mod: ,,, | Performed by: INTERNAL MEDICINE

## 2023-06-23 PROCEDURE — 4010F ACE/ARB THERAPY RXD/TAKEN: CPT | Mod: CPTII,,, | Performed by: INTERNAL MEDICINE

## 2023-06-23 PROCEDURE — 3008F PR BODY MASS INDEX (BMI) DOCUMENTED: ICD-10-PCS | Mod: CPTII,,, | Performed by: INTERNAL MEDICINE

## 2023-06-23 PROCEDURE — 99214 OFFICE O/P EST MOD 30 MIN: CPT | Mod: S$PBB,,, | Performed by: INTERNAL MEDICINE

## 2023-06-23 PROCEDURE — 1159F MED LIST DOCD IN RCRD: CPT | Mod: CPTII,,, | Performed by: INTERNAL MEDICINE

## 2023-06-23 PROCEDURE — 99999 PR PBB SHADOW E&M-EST. PATIENT-LVL IV: CPT | Mod: PBBFAC,,, | Performed by: INTERNAL MEDICINE

## 2023-06-23 PROCEDURE — 99999 PR PBB SHADOW E&M-EST. PATIENT-LVL IV: ICD-10-PCS | Mod: PBBFAC,,, | Performed by: INTERNAL MEDICINE

## 2023-06-23 PROCEDURE — 99214 PR OFFICE/OUTPT VISIT, EST, LEVL IV, 30-39 MIN: ICD-10-PCS | Mod: S$PBB,,, | Performed by: INTERNAL MEDICINE

## 2023-06-23 PROCEDURE — 3078F PR MOST RECENT DIASTOLIC BLOOD PRESSURE < 80 MM HG: ICD-10-PCS | Mod: CPTII,,, | Performed by: INTERNAL MEDICINE

## 2023-06-23 PROCEDURE — 93010 ELECTROCARDIOGRAM REPORT: CPT | Mod: ,,, | Performed by: INTERNAL MEDICINE

## 2023-06-23 PROCEDURE — 1160F RVW MEDS BY RX/DR IN RCRD: CPT | Mod: CPTII,,, | Performed by: INTERNAL MEDICINE

## 2023-06-23 PROCEDURE — 1159F PR MEDICATION LIST DOCUMENTED IN MEDICAL RECORD: ICD-10-PCS | Mod: CPTII,,, | Performed by: INTERNAL MEDICINE

## 2023-06-23 PROCEDURE — 1160F PR REVIEW ALL MEDS BY PRESCRIBER/CLIN PHARMACIST DOCUMENTED: ICD-10-PCS | Mod: CPTII,,, | Performed by: INTERNAL MEDICINE

## 2023-06-23 PROCEDURE — 3008F BODY MASS INDEX DOCD: CPT | Mod: CPTII,,, | Performed by: INTERNAL MEDICINE

## 2023-06-23 PROCEDURE — 3078F DIAST BP <80 MM HG: CPT | Mod: CPTII,,, | Performed by: INTERNAL MEDICINE

## 2023-06-23 PROCEDURE — 3074F PR MOST RECENT SYSTOLIC BLOOD PRESSURE < 130 MM HG: ICD-10-PCS | Mod: CPTII,,, | Performed by: INTERNAL MEDICINE

## 2023-06-23 RX ORDER — METOPROLOL SUCCINATE 50 MG/1
50 TABLET, EXTENDED RELEASE ORAL DAILY
Qty: 30 TABLET | Refills: 11 | Status: SHIPPED | OUTPATIENT
Start: 2023-06-23 | End: 2024-06-22

## 2023-06-23 NOTE — PROGRESS NOTES
Subjective:   Patient ID:  Tri Staton is a 57 y.o. female who presents for follow up of No chief complaint on file.      57 yo female, f/u for severe multi-vessel CAD. Wheelchair bound after CVA.  PMH recent Dx of CAD and CVA with residual left side weakness in , h/o DVT, HTN, MI, hypercholesteremia, and neuropathy. H/o drug abuse. HALIE CPAP Rx pending  Severe multivessel Dz. Not candidate for CABG or PCI. Continue on medical Rx  , had chest pain, dyspnea and could not move. Went to [facility]. Dx of CVA. D/c'ed. Few days later, felt worse and fever. Went to hospital again. Had cath done and CABG was advised. Pt decided to go back to  for medical care.   Evaluated by Dr. Steele CVT at [facility] in  and due to diffuse coronary disease and high risk, she is not candidate for CABG. Subsequently she had cardiac evaluation at [facility] cardiology and thought was not candidate for either PCI or CABG. Also went through cath imaging with Dr. Torres and no PCI indicated.   Went to ER [facility] on 09/16 for LLE swelling and pain.  US revealed positive DVT and started eliquis. Chest CTA moderate pericardial effusion and no PE. D/c'ed from ER.  10/04, c/o chest pain and weakness. With hemarroids. Sent to ER and troponin < 0.006 and EKG did not show acute stt change. BNP normal  : ECHo showed normal EF and moderate pericardial effusion. No tamponade.   BNP 17 in   Quit smoking 3 months, 1 cigar a day.x 10 yrs  Quit drinking 6 months, 1 pint of liquor a day x10 yrs.  Smoked marijuana  Smoked cocaines one yr ago for 10 yrs, remote IV cocaine 25 yrs ago.    C/o SOB and leg swelling recently. Sleeps with two pillows. No PND, palpitation and dizziness.   Limited walking due to back pain. No orthopnea.   No chest pain,      visit  Last seen was in .  S/p right carotid CEA in  at [facility] by Dr. Shay. Alsohas large thyroid  Weight gain. No walking and  bed bound. Episode of syncope in doc's office and question of CVA. F/u neurologist at [facility]  No chest pain, dyspnea orthopnea.   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 Limited activity after CVA and stable  No chest pain dyspnea palpitation and dizziness  No active bleeding. On Trulicity Rx for DM per PCP  Ekg sinus tachy RBBB LAFB               Past Medical History:   Diagnosis Date    Coronary artery disease     Coronary artery disease of native artery of native heart with stable angina pectoris 2018    Hypercholesteremia     Hypertension     Neuropathy     Stroke        Past Surgical History:   Procedure Laterality Date     SECTION         Social History     Tobacco Use    Smoking status: Former    Smokeless tobacco: Never   Substance Use Topics    Alcohol use: No    Drug use: No       Family History   Problem Relation Age of Onset    Hypertension Mother          ROS    Objective:   Physical Exam  HENT:      Head: Normocephalic.   Eyes:      Pupils: Pupils are equal, round, and reactive to light.   Neck:      Thyroid: No thyromegaly.      Vascular: Normal carotid pulses. No carotid bruit or JVD.   Cardiovascular:      Rate and Rhythm: Normal rate and regular rhythm. No extrasystoles are present.     Chest Wall: PMI is not displaced.      Pulses: Normal pulses.      Heart sounds: Normal heart sounds. No murmur heard.    No gallop. No S3 sounds.   Pulmonary:      Effort: No respiratory distress.      Breath sounds: Normal breath sounds. No stridor.   Abdominal:      General: Bowel sounds are normal.      Palpations: Abdomen is soft.      Tenderness: There is no abdominal tenderness. There is no rebound.   Musculoskeletal:      Comments: 1+ edema on LLE   Skin:     Findings: No rash.   Neurological:      Mental Status: She is alert  and oriented to person, place, and time.   Psychiatric:         Behavior: Behavior normal.       Lab Results   Component Value Date    CHOL 112 05/07/2021    CHOL 104 08/15/2020    CHOL 80 (L) 11/24/2018     Lab Results   Component Value Date    HDL 24 (L) 05/07/2021    HDL 29 (L) 08/15/2020    HDL 22 (L) 11/24/2018     Lab Results   Component Value Date    LDLCALC 67 05/07/2021    LDLCALC 60 08/15/2020    LDLCALC 42.8 (L) 11/24/2018     Lab Results   Component Value Date    TRIG 104 05/07/2021    TRIG 74 08/15/2020    TRIG 76 11/24/2018     Lab Results   Component Value Date    CHOLHDL 27.5 11/24/2018       Chemistry        Component Value Date/Time     09/16/2019 1447    K 4.9 09/16/2019 1447     09/16/2019 1447    CO2 23 09/16/2019 1447    BUN 22 (H) 09/16/2019 1447    CREATININE 1.3 09/16/2019 1447    GLU 92 09/16/2019 1447        Component Value Date/Time    CALCIUM 9.3 09/16/2019 1447    ALKPHOS 73 12/12/2018 2200    AST 78 (H) 12/12/2018 2200    ALT 51 (H) 12/12/2018 2200    BILITOT 0.4 12/12/2018 2200    ESTGFRAFRICA 53.0 (A) 09/16/2019 1447    EGFRNONAA 46.0 (A) 09/16/2019 1447          Lab Results   Component Value Date    HGBA1C 14.4 (H) 01/24/2022     Lab Results   Component Value Date    TSH 1.617 05/06/2021     Lab Results   Component Value Date    INR 1.2 11/03/2018    INR 1.2 10/04/2018    INR 1.2 09/27/2018     Lab Results   Component Value Date    WBC 3.68 (L) 12/12/2018    HGB 13.3 02/09/2021    HCT 42.8 02/09/2021    MCV 92 12/12/2018     12/12/2018     BMP  Sodium   Date Value Ref Range Status   09/16/2019 139 136 - 145 mmol/L Final     Potassium   Date Value Ref Range Status   09/16/2019 4.9 3.5 - 5.1 mmol/L Final     Chloride   Date Value Ref Range Status   09/16/2019 105 95 - 110 mmol/L Final     CO2   Date Value Ref Range Status   09/16/2019 23 23 - 29 mmol/L Final     BUN   Date Value Ref Range Status   09/16/2019 22 (H) 6 - 20 mg/dL Final     Creatinine   Date Value Ref  Range Status   09/16/2019 1.3 0.5 - 1.4 mg/dL Final     Calcium   Date Value Ref Range Status   09/16/2019 9.3 8.7 - 10.5 mg/dL Final     Anion Gap   Date Value Ref Range Status   09/16/2019 11 8 - 16 mmol/L Final     eGFR if    Date Value Ref Range Status   09/16/2019 53.0 (A) >60 mL/min/1.73 m^2 Final     eGFR if non    Date Value Ref Range Status   09/16/2019 46.0 (A) >60 mL/min/1.73 m^2 Final     Comment:     Calculation used to obtain the estimated glomerular filtration  rate (eGFR) is the CKD-EPI equation.        BNP  @LABRCNTIP(BNP,BNPTRIAGEBLO)@  @LABRCNTIP(troponini)@  CrCl cannot be calculated (Patient's most recent lab result is older than the maximum 7 days allowed.).  No results found in the last 24 hours.  No results found in the last 24 hours.  No results found in the last 24 hours.    Assessment:      1. Essential hypertension    2. Bifascicular block    3. Sinus tachycardia    4. Coronary artery disease of native artery of native heart with stable angina pectoris    5. Mixed hyperlipidemia    6. Chronic diastolic congestive heart failure    7. DVT (deep vein thrombosis) in pregnancy        Plan:   D/c hydralazine to avoid hypotension  Increase torpoXL to 50mg daily for tachy  Check BP pulse at home and report to my office in 4 weeks  Obtain the recent labs from PCP    Decrease Lisinopril from 20 mg in am and 40 mg in PM to 20 mg daily  Add amlodipine 5 mg daily for HTN and CAD  Continue lisinopril amlodipine imdur ranexa  COntiune ASA Eliquis 5mg bid life long for DVT and Lipitor    Counseled DASH  Check Lipid profile with PCP in 6 months  Recommend heart-healthy diet, weight control   Sukhi. Risk modification.   I have reviewed all pertinent labs and cardiac studies independently. Plans and recommendations have been formulated under my direct supervision. All questions answered and patient voiced understanding.   If symptoms persist go to the ED  RTC in 12  months

## 2023-09-19 DIAGNOSIS — I25.118 CORONARY ARTERY DISEASE OF NATIVE ARTERY OF NATIVE HEART WITH STABLE ANGINA PECTORIS: ICD-10-CM

## 2023-09-19 RX ORDER — ISOSORBIDE MONONITRATE 60 MG/1
TABLET, EXTENDED RELEASE ORAL
Qty: 60 TABLET | Refills: 6 | Status: SHIPPED | OUTPATIENT
Start: 2023-09-19

## 2023-11-06 RX ORDER — LISINOPRIL 20 MG/1
TABLET ORAL
Qty: 30 TABLET | Refills: 7 | Status: SHIPPED | OUTPATIENT
Start: 2023-11-06

## 2023-12-05 RX ORDER — AMLODIPINE BESYLATE 5 MG/1
TABLET ORAL
Qty: 30 TABLET | Refills: 8 | Status: SHIPPED | OUTPATIENT
Start: 2023-12-05

## 2024-04-08 RX ORDER — METOPROLOL SUCCINATE 50 MG/1
50 TABLET, EXTENDED RELEASE ORAL
Qty: 90 TABLET | Refills: 1 | Status: SHIPPED | OUTPATIENT
Start: 2024-04-08

## 2024-04-15 DIAGNOSIS — I25.118 CORONARY ARTERY DISEASE OF NATIVE ARTERY OF NATIVE HEART WITH STABLE ANGINA PECTORIS: ICD-10-CM

## 2024-04-15 RX ORDER — ISOSORBIDE MONONITRATE 60 MG/1
TABLET, EXTENDED RELEASE ORAL
Qty: 180 TABLET | Refills: 3 | Status: SHIPPED | OUTPATIENT
Start: 2024-04-15

## 2024-04-16 RX ORDER — AMLODIPINE BESYLATE 5 MG/1
5 TABLET ORAL DAILY
Qty: 30 TABLET | Refills: 8 | Status: SHIPPED | OUTPATIENT
Start: 2024-04-16

## 2024-05-29 RX ORDER — LISINOPRIL 20 MG/1
TABLET ORAL
Qty: 90 TABLET | Refills: 2 | Status: SHIPPED | OUTPATIENT
Start: 2024-05-29

## 2024-06-20 DIAGNOSIS — I10 ESSENTIAL HYPERTENSION: Primary | ICD-10-CM

## 2024-06-20 DIAGNOSIS — I50.32 CHRONIC DIASTOLIC CONGESTIVE HEART FAILURE: ICD-10-CM

## 2024-07-16 ENCOUNTER — TELEPHONE (OUTPATIENT)
Dept: CARDIOLOGY | Facility: CLINIC | Age: 59
End: 2024-07-16
Payer: MEDICAID

## 2024-07-16 NOTE — TELEPHONE ENCOUNTER
Spoke with pt in regards to scheduling follow up appt.                     ----- Message from Brooklyn Oreilly sent at 7/16/2024  1:26 PM CDT -----  Contact: Tri Bartlett needs a call back at [phone], Regards to getting an appointment work for a cardio check up.    Thanks  Td

## 2024-07-30 DIAGNOSIS — O22.30 DVT (DEEP VEIN THROMBOSIS) IN PREGNANCY: ICD-10-CM

## 2024-07-30 DIAGNOSIS — I25.118 CORONARY ARTERY DISEASE OF NATIVE ARTERY OF NATIVE HEART WITH STABLE ANGINA PECTORIS: ICD-10-CM

## 2024-07-30 DIAGNOSIS — I10 ESSENTIAL HYPERTENSION: ICD-10-CM

## 2024-08-01 RX ORDER — FUROSEMIDE 40 MG/1
40 TABLET ORAL 2 TIMES DAILY
Qty: 60 TABLET | Refills: 11 | Status: SHIPPED | OUTPATIENT
Start: 2024-08-01

## 2024-08-01 RX ORDER — METOPROLOL SUCCINATE 50 MG/1
50 TABLET, EXTENDED RELEASE ORAL
Qty: 90 TABLET | Refills: 3 | Status: SHIPPED | OUTPATIENT
Start: 2024-08-01

## 2024-08-07 DIAGNOSIS — I25.118 CORONARY ARTERY DISEASE OF NATIVE ARTERY OF NATIVE HEART WITH STABLE ANGINA PECTORIS: Primary | ICD-10-CM

## 2024-08-07 DIAGNOSIS — I10 ESSENTIAL HYPERTENSION: ICD-10-CM

## 2024-08-13 ENCOUNTER — HOSPITAL ENCOUNTER (OUTPATIENT)
Dept: CARDIOLOGY | Facility: HOSPITAL | Age: 59
Discharge: HOME | End: 2024-08-13
Attending: INTERNAL MEDICINE
Payer: MEDICAID

## 2024-08-13 ENCOUNTER — OFFICE VISIT (OUTPATIENT)
Dept: CARDIOLOGY | Facility: CLINIC | Age: 59
End: 2024-08-13
Payer: MEDICAID

## 2024-08-13 VITALS
WEIGHT: 279 LBS | HEIGHT: 63 IN | HEART RATE: 100 BPM | DIASTOLIC BLOOD PRESSURE: 70 MMHG | SYSTOLIC BLOOD PRESSURE: 110 MMHG | OXYGEN SATURATION: 98 % | BODY MASS INDEX: 49.43 KG/M2

## 2024-08-13 DIAGNOSIS — I25.118 CORONARY ARTERY DISEASE OF NATIVE ARTERY OF NATIVE HEART WITH STABLE ANGINA PECTORIS: ICD-10-CM

## 2024-08-13 DIAGNOSIS — I10 ESSENTIAL HYPERTENSION: ICD-10-CM

## 2024-08-13 DIAGNOSIS — I82.90 VTE (VENOUS THROMBOEMBOLISM): ICD-10-CM

## 2024-08-13 DIAGNOSIS — I50.32 CHRONIC DIASTOLIC CONGESTIVE HEART FAILURE: ICD-10-CM

## 2024-08-13 DIAGNOSIS — I31.39 PERICARDIAL EFFUSION: ICD-10-CM

## 2024-08-13 DIAGNOSIS — O22.30 DVT (DEEP VEIN THROMBOSIS) IN PREGNANCY: ICD-10-CM

## 2024-08-13 DIAGNOSIS — I25.118 CORONARY ARTERY DISEASE OF NATIVE ARTERY OF NATIVE HEART WITH STABLE ANGINA PECTORIS: Primary | ICD-10-CM

## 2024-08-13 DIAGNOSIS — I63.9 CEREBROVASCULAR ACCIDENT (CVA), UNSPECIFIED MECHANISM: ICD-10-CM

## 2024-08-13 DIAGNOSIS — E78.2 MIXED HYPERLIPIDEMIA: ICD-10-CM

## 2024-08-13 DIAGNOSIS — E11.9 CONTROLLED TYPE 2 DIABETES MELLITUS WITHOUT COMPLICATION, WITHOUT LONG-TERM CURRENT USE OF INSULIN: ICD-10-CM

## 2024-08-13 DIAGNOSIS — R00.0 SINUS TACHYCARDIA: ICD-10-CM

## 2024-08-13 LAB
OHS QRS DURATION: 158 MS
OHS QTC CALCULATION: 523 MS

## 2024-08-13 PROCEDURE — 1160F RVW MEDS BY RX/DR IN RCRD: CPT | Mod: CPTII,,, | Performed by: INTERNAL MEDICINE

## 2024-08-13 PROCEDURE — 4010F ACE/ARB THERAPY RXD/TAKEN: CPT | Mod: CPTII,,, | Performed by: INTERNAL MEDICINE

## 2024-08-13 PROCEDURE — 3008F BODY MASS INDEX DOCD: CPT | Mod: CPTII,,, | Performed by: INTERNAL MEDICINE

## 2024-08-13 PROCEDURE — 99999 PR PBB SHADOW E&M-EST. PATIENT-LVL IV: CPT | Mod: PBBFAC,,, | Performed by: INTERNAL MEDICINE

## 2024-08-13 PROCEDURE — 3074F SYST BP LT 130 MM HG: CPT | Mod: CPTII,,, | Performed by: INTERNAL MEDICINE

## 2024-08-13 PROCEDURE — 93010 ELECTROCARDIOGRAM REPORT: CPT | Mod: ,,, | Performed by: INTERNAL MEDICINE

## 2024-08-13 PROCEDURE — 99215 OFFICE O/P EST HI 40 MIN: CPT | Mod: S$PBB,,, | Performed by: INTERNAL MEDICINE

## 2024-08-13 PROCEDURE — 3078F DIAST BP <80 MM HG: CPT | Mod: CPTII,,, | Performed by: INTERNAL MEDICINE

## 2024-08-13 PROCEDURE — 3046F HEMOGLOBIN A1C LEVEL >9.0%: CPT | Mod: CPTII,,, | Performed by: INTERNAL MEDICINE

## 2024-08-13 PROCEDURE — 1159F MED LIST DOCD IN RCRD: CPT | Mod: CPTII,,, | Performed by: INTERNAL MEDICINE

## 2024-08-13 PROCEDURE — 99214 OFFICE O/P EST MOD 30 MIN: CPT | Mod: PBBFAC,25 | Performed by: INTERNAL MEDICINE

## 2024-08-13 PROCEDURE — 93005 ELECTROCARDIOGRAM TRACING: CPT

## 2024-08-13 NOTE — PROGRESS NOTES
Subjective:   Patient ID:  Tri Staton is a 59 y.o. female who presents for follow up of No chief complaint on file.      60 yo female, 1yr f/u for severe multi-vessel CAD. Wheelchair bound after CVA.  PMH recent Dx of CAD and CVA with residual left side weakness in , h/o DVT, HTN, MI, hypercholesteremia, and neuropathy. H/o drug abuse. HALIE CPAP Rx pending  Severe multivessel Dz. Not candidate for CABG or PCI. Continue on medical Rx  , had chest pain, dyspnea and could not move. Went to [facility] ED. Dx of CVA. D/c'ed. Few days later, felt worse and fever. Went to hospital again. Had cath done and CABG was advised. Pt decided to go back to  for medical care.   Evaluated by Dr. Steele CVT at [facility] in  and due to diffuse coronary disease and high risk, she is not candidate for CABG. Subsequently she had cardiac evaluation at [facility] cardiology and thought was not candidate for either PCI or CABG. Also went through cath imaging with Dr. Torres and no PCI indicated.   Went to ER [facility] on 09/16 for LLE swelling and pain.  US revealed positive DVT and started eliquis. Chest CTA moderate pericardial effusion and no PE. D/c'ed from ER.  10/04, c/o chest pain and weakness. With hemarroids. Sent to ER and troponin < 0.006 and EKG did not show acute stt change. BNP normal  : ECHo showed normal EF and moderate pericardial effusion. No tamponade.   BNP 17 in   Quit smoking 3 months, 1 cigar a day.x 10 yrs  Quit drinking 6 months, 1 pint of liquor a day x10 yrs.  Smoked marijuana  Smoked cocaines one yr ago for 10 yrs, remote IV cocaine 25 yrs ago.    C/o SOB and leg swelling recently. Sleeps with two pillows. No PND, palpitation and dizziness.   Limited walking due to back pain. No orthopnea.   No chest pain,      visit  Last seen was in .  S/p right carotid CEA in  at [facility] by Dr. Shay. Alsohas large thyroid  Weight gain. No walking  and bed bound. Episode of syncope in doc's office and question of CVA. F/u neurologist at [facility]  No chest pain, dyspnea orthopnea.   Occasional dizziness and anxiety  EKG today NSR and RBBB LAFB   echo at [facility] normal EF and no pericardial effusion  Her DVT occurred after CVA  LDL 67 in  visit  Came in with care giver. Limited activity after CVA and stable  No chest pain dyspnea palpitation and dizziness  No active bleeding. On Trulicity Rx for DM per PCP  Ekg sinus tachy RBBB LAFB     Interval history  C/o back pain and neck pain   EKG reviewed by myself today reveals NSR RBBB and LAFB   No chest pain dizziness orthopnea   Some leg swelling and weight gain  BP and LDL C  A1c 14  In the office, pt became allergic and slipped down to the floor . No syncope. BP checked 88 mmHG and BS ok  Called EMS.  Pt improved 10 mins later  Declined to go to ER              Past Medical History:   Diagnosis Date    Coronary artery disease     Coronary artery disease of native artery of native heart with stable angina pectoris 2018    Hypercholesteremia     Hypertension     Neuropathy     Stroke        Past Surgical History:   Procedure Laterality Date     SECTION         Social History     Tobacco Use    Smoking status: Former    Smokeless tobacco: Never   Substance Use Topics    Alcohol use: No    Drug use: No       Family History   Problem Relation Name Age of Onset    Hypertension Mother           ROS    Objective:   Physical Exam  HENT:      Head: Normocephalic.   Eyes:      Pupils: Pupils are equal, round, and reactive to light.   Neck:      Thyroid: No thyromegaly.      Vascular: Normal carotid pulses. No carotid bruit or JVD.   Cardiovascular:      Rate and Rhythm: Normal rate and regular rhythm. No extrasystoles are present.     Chest Wall: PMI is not displaced.      Pulses: Normal pulses.      Heart sounds: Normal heart sounds. No murmur heard.     No gallop. No S3 sounds.   Pulmonary:       Effort: No respiratory distress.      Breath sounds: Normal breath sounds. No stridor.   Abdominal:      General: Bowel sounds are normal.      Palpations: Abdomen is soft.      Tenderness: There is no abdominal tenderness. There is no rebound.   Musculoskeletal:      Comments: 1+ edema on LLE   Skin:     Findings: No rash.   Neurological:      Mental Status: She is alert and oriented to person, place, and time.   Psychiatric:         Behavior: Behavior normal.         Lab Results   Component Value Date    CHOL 112 05/07/2021    CHOL 104 08/15/2020    CHOL 80 (L) 11/24/2018     Lab Results   Component Value Date    HDL 24 (L) 05/07/2021    HDL 29 (L) 08/15/2020    HDL 22 (L) 11/24/2018     Lab Results   Component Value Date    LDLCALC 67 05/07/2021    LDLCALC 60 08/15/2020    LDLCALC 42.8 (L) 11/24/2018     Lab Results   Component Value Date    TRIG 104 05/07/2021    TRIG 74 08/15/2020    TRIG 76 11/24/2018     Lab Results   Component Value Date    CHOLHDL 27.5 11/24/2018       Chemistry        Component Value Date/Time     05/22/2024 0744     09/16/2019 1447    K 4.3 05/22/2024 0744    K 4.9 09/16/2019 1447     05/22/2024 0744     09/16/2019 1447    CO2 23 05/22/2024 0744    CO2 23 09/16/2019 1447    BUN 26 (H) 05/22/2024 0744    BUN 22 (H) 09/16/2019 1447    CREATININE 1.19 (H) 05/22/2024 0744    CREATININE 1.3 09/16/2019 1447    GLU 92 09/16/2019 1447        Component Value Date/Time    CALCIUM 9.0 05/22/2024 0744    CALCIUM 9.3 09/16/2019 1447    ALKPHOS 73 12/12/2018 2200    AST 78 (H) 12/12/2018 2200    ALT 51 (H) 12/12/2018 2200    BILITOT 0.4 12/12/2018 2200    ESTGFRAFRICA 52 05/22/2024 0744    ESTGFRAFRICA 53.0 (A) 09/16/2019 1447    EGFRNONAA 46.0 (A) 09/16/2019 1447          Lab Results   Component Value Date    HGBA1C 14.7 (H) 01/26/2024     Lab Results   Component Value Date    TSH 1.617 05/06/2021     Lab Results   Component Value Date    INR 1.1 01/21/2022    INR 1.4  05/06/2021    INR 1.3 02/08/2021     Lab Results   Component Value Date    WBC 3.68 (L) 12/12/2018    HGB 13.3 02/09/2021    HCT 42.8 02/09/2021    MCV 92 12/12/2018     12/12/2018     BMP  Sodium   Date Value Ref Range Status   05/22/2024 140 136 - 145 mmol/L Final   09/16/2019 139 136 - 145 mmol/L Final     Potassium   Date Value Ref Range Status   05/22/2024 4.3 3.5 - 5.1 mmol/L Final   09/16/2019 4.9 3.5 - 5.1 mmol/L Final     Chloride   Date Value Ref Range Status   05/22/2024 106 100 - 109 mmol/L Final   09/16/2019 105 95 - 110 mmol/L Final     CO2   Date Value Ref Range Status   09/16/2019 23 23 - 29 mmol/L Final     Carbon Dioxide   Date Value Ref Range Status   05/22/2024 23 22 - 33 mmol/L Final     BUN   Date Value Ref Range Status   09/16/2019 22 (H) 6 - 20 mg/dL Final     Blood Urea Nitrogen   Date Value Ref Range Status   05/22/2024 26 (H) 5 - 25 mg/dL Final     Creatinine   Date Value Ref Range Status   05/22/2024 1.19 (H) 0.55 - 1.02 mg/dL Final   09/16/2019 1.3 0.5 - 1.4 mg/dL Final     Calcium   Date Value Ref Range Status   05/22/2024 9.0 8.8 - 10.6 mg/dL Final   09/16/2019 9.3 8.7 - 10.5 mg/dL Final     Anion Gap   Date Value Ref Range Status   05/22/2024 11 8 - 16 mmol/L Final   09/16/2019 11 8 - 16 mmol/L Final     eGFR if    Date Value Ref Range Status   09/16/2019 53.0 (A) >60 mL/min/1.73 m^2 Final     eGFR    Date Value Ref Range Status   05/22/2024 52 mL/min/1.73mSq Final     Comment:     In accordance with NKF-ASN Task Force recommendation, calculation based on the Chronic Kidney Disease Epidemiology Collaboration (CKD-EPI) equation without adjustment for race. eGFR adjusted for gender and age and calculated in ml/min/1.73mSquared. eGFR cannot be calculated if patient is under 18 years of age.     Reference Range:   >= 60 ml/min/1.73mSquared.     eGFR if non    Date Value Ref Range Status   09/16/2019 46.0 (A) >60 mL/min/1.73 m^2 Final      Comment:     Calculation used to obtain the estimated glomerular filtration  rate (eGFR) is the CKD-EPI equation.        BNP  @LABRCNTIP(BNP,BNPTRIAGEBLO)@  @LABRCNTIP(troponini)@  CrCl cannot be calculated (Patient's most recent lab result is older than the maximum 7 days allowed.).  No results found in the last 24 hours.  No results found in the last 24 hours.  No results found in the last 24 hours.    Assessment:      1. Cerebrovascular accident (CVA), unspecified mechanism    2. Chronic diastolic congestive heart failure    3. Coronary artery disease of native artery of native heart with stable angina pectoris    4. Essential hypertension    5. Mixed hyperlipidemia    6. Sinus tachycardia    7. Pericardial effusion    8. DVT (deep vein thrombosis) in pregnancy    9. VTE (venous thromboembolism)        Plan:   Pt declined to go to ER  Continue current Rx  Will call in one week about the BP

## 2024-09-09 DIAGNOSIS — O22.30 DVT (DEEP VEIN THROMBOSIS) IN PREGNANCY: ICD-10-CM

## 2024-09-09 DIAGNOSIS — I25.118 CORONARY ARTERY DISEASE OF NATIVE ARTERY OF NATIVE HEART WITH STABLE ANGINA PECTORIS: ICD-10-CM

## 2024-09-09 DIAGNOSIS — I10 ESSENTIAL HYPERTENSION: ICD-10-CM

## 2024-09-10 RX ORDER — FUROSEMIDE 40 MG/1
40 TABLET ORAL 2 TIMES DAILY
Qty: 60 TABLET | Refills: 11 | Status: SHIPPED | OUTPATIENT
Start: 2024-09-10

## 2024-12-05 ENCOUNTER — TELEPHONE (OUTPATIENT)
Dept: CARDIOLOGY | Facility: CLINIC | Age: 59
End: 2024-12-05
Payer: MEDICAID

## 2024-12-05 NOTE — TELEPHONE ENCOUNTER
Spoke with patient an offered 12- at 4pm. Patient accepted appointment. Sent July Banegas a secured message to assist with scheduling appointment.   ----- Message from Anca sent at 12/5/2024 10:58 AM CST -----  Contact: Patient, [phone]  Calling to schedule an established Medicaid appointment for follow up. Please call her. Thanks.

## 2025-02-17 DIAGNOSIS — I25.118 CORONARY ARTERY DISEASE OF NATIVE ARTERY OF NATIVE HEART WITH STABLE ANGINA PECTORIS: ICD-10-CM

## 2025-02-17 RX ORDER — ISOSORBIDE MONONITRATE 60 MG/1
60 TABLET, EXTENDED RELEASE ORAL 2 TIMES DAILY
Qty: 180 TABLET | Refills: 3 | Status: SHIPPED | OUTPATIENT
Start: 2025-02-17

## 2025-05-07 RX ORDER — LISINOPRIL 20 MG/1
20 TABLET ORAL
Qty: 90 TABLET | Refills: 2 | Status: SHIPPED | OUTPATIENT
Start: 2025-05-07

## 2025-08-04 RX ORDER — METOPROLOL SUCCINATE 50 MG/1
50 TABLET, EXTENDED RELEASE ORAL
Qty: 90 TABLET | Refills: 3 | Status: SHIPPED | OUTPATIENT
Start: 2025-08-04